# Patient Record
Sex: FEMALE | Employment: UNEMPLOYED | URBAN - METROPOLITAN AREA
[De-identification: names, ages, dates, MRNs, and addresses within clinical notes are randomized per-mention and may not be internally consistent; named-entity substitution may affect disease eponyms.]

---

## 2017-06-08 LAB — PAP-ABSTRACT: NORMAL

## 2020-01-05 LAB
CREAT SERPL-MCNC: 0.95 MG/DL (ref 0.57–1.11)
GFR SERPL CREATININE-BSD FRML MDRD: >60 ML/MIN/1.73M2
GLUCOSE SERPL-MCNC: 93 MG/DL (ref 65–100)
POTASSIUM SERPL-SCNC: 4.1 MMOL/L (ref 3.5–5)

## 2020-01-20 LAB
ABO + RH BLD: NORMAL
ABO + RH BLD: NORMAL
BLD GP AB SCN SERPL QL: NEGATIVE
HBV SURFACE AG SERPL QL IA: NORMAL
HCT VFR BLD AUTO: 42.7 %
HEMOGLOBIN: 13.4 G/DL (ref 11.7–15.7)
HIV 1+2 AB+HIV1 P24 AG SERPL QL IA: NORMAL
PLATELET # BLD AUTO: 259 10^9/L
RUBELLA ABY IGG: NORMAL
TREPONEMA ANTIBODIES: NORMAL

## 2020-03-23 LAB
ALT SERPL-CCNC: 46 IU/L
AST SERPL-CCNC: 32 IU/L (ref 5–40)

## 2020-04-15 ENCOUNTER — TRANSFERRED RECORDS (OUTPATIENT)
Dept: MULTI SPECIALTY CLINIC | Facility: CLINIC | Age: 30
End: 2020-04-15

## 2020-04-15 LAB
C TRACH DNA SPEC QL PROBE+SIG AMP: NEGATIVE
N GONORRHOEA DNA SPEC QL PROBE+SIG AMP: NEGATIVE
SPECIMEN DESCRIP: NORMAL
SPECIMEN DESCRIPTION: NORMAL

## 2020-04-21 ENCOUNTER — TELEPHONE (OUTPATIENT)
Dept: MIDWIFE SERVICES | Facility: CLINIC | Age: 30
End: 2020-04-21

## 2020-04-21 DIAGNOSIS — Z34.92 ENCOUNTER FOR SUPERVISION OF NORMAL PREGNANCY IN SECOND TRIMESTER, UNSPECIFIED GRAVIDITY: Primary | ICD-10-CM

## 2020-04-21 PROBLEM — Z67.91 RH NEGATIVE STATUS DURING PREGNANCY: Status: ACTIVE | Noted: 2020-04-21

## 2020-04-21 PROBLEM — R87.619 ABNORMAL PAP SMEAR OF CERVIX: Status: ACTIVE | Noted: 2020-04-21

## 2020-04-21 PROBLEM — Z86.14 HISTORY OF MRSA INFECTION: Status: ACTIVE | Noted: 2020-04-21

## 2020-04-21 PROBLEM — E55.9 VITAMIN D DEFICIENCY: Status: ACTIVE | Noted: 2020-04-21

## 2020-04-21 PROBLEM — O26.899 RH NEGATIVE STATUS DURING PREGNANCY: Status: ACTIVE | Noted: 2020-04-21

## 2020-04-21 PROBLEM — Z86.19 HISTORY OF SEXUALLY TRANSMITTED DISEASE: Status: ACTIVE | Noted: 2020-04-21

## 2020-04-21 PROBLEM — Z34.00 SUPERVISION OF NORMAL FIRST PREGNANCY: Status: ACTIVE | Noted: 2020-04-21

## 2020-04-21 NOTE — TELEPHONE ENCOUNTER
Patient is a transfer OB from [facility]. Called to schedule appointment (scheduled for 4/24) and patient was wondering if ultrasound could be done as well. JLUIS is noted to be 9/4/20 in records. Please order if okay. Thank you!  Cathy Umanzor,

## 2020-04-23 PROBLEM — Z23 NEED FOR TDAP VACCINATION: Status: ACTIVE | Noted: 2020-04-23

## 2020-04-28 ENCOUNTER — PRENATAL OFFICE VISIT (OUTPATIENT)
Dept: MIDWIFE SERVICES | Facility: CLINIC | Age: 30
End: 2020-04-28
Payer: COMMERCIAL

## 2020-04-28 ENCOUNTER — ANCILLARY PROCEDURE (OUTPATIENT)
Dept: ULTRASOUND IMAGING | Facility: CLINIC | Age: 30
End: 2020-04-28
Attending: ADVANCED PRACTICE MIDWIFE
Payer: COMMERCIAL

## 2020-04-28 VITALS
WEIGHT: 262.9 LBS | HEIGHT: 69 IN | BODY MASS INDEX: 38.94 KG/M2 | SYSTOLIC BLOOD PRESSURE: 106 MMHG | DIASTOLIC BLOOD PRESSURE: 80 MMHG | TEMPERATURE: 99.2 F

## 2020-04-28 DIAGNOSIS — Z67.91 RH NEGATIVE, ANTEPARTUM: ICD-10-CM

## 2020-04-28 DIAGNOSIS — O23.599 BACTERIAL VAGINOSIS IN PREGNANCY: Primary | ICD-10-CM

## 2020-04-28 DIAGNOSIS — O26.899 RH NEGATIVE, ANTEPARTUM: ICD-10-CM

## 2020-04-28 DIAGNOSIS — Z34.92 ENCOUNTER FOR SUPERVISION OF NORMAL PREGNANCY IN SECOND TRIMESTER, UNSPECIFIED GRAVIDITY: ICD-10-CM

## 2020-04-28 DIAGNOSIS — N89.8 VAGINAL ITCHING: ICD-10-CM

## 2020-04-28 DIAGNOSIS — E55.9 VITAMIN D DEFICIENCY: ICD-10-CM

## 2020-04-28 DIAGNOSIS — B96.89 BACTERIAL VAGINOSIS IN PREGNANCY: Primary | ICD-10-CM

## 2020-04-28 DIAGNOSIS — Z23 NEED FOR TDAP VACCINATION: ICD-10-CM

## 2020-04-28 LAB
SPECIMEN SOURCE: ABNORMAL
WET PREP SPEC: ABNORMAL

## 2020-04-28 PROCEDURE — 76805 OB US >/= 14 WKS SNGL FETUS: CPT | Performed by: OBSTETRICS & GYNECOLOGY

## 2020-04-28 PROCEDURE — 99213 OFFICE O/P EST LOW 20 MIN: CPT | Performed by: ADVANCED PRACTICE MIDWIFE

## 2020-04-28 PROCEDURE — 87210 SMEAR WET MOUNT SALINE/INK: CPT | Performed by: ADVANCED PRACTICE MIDWIFE

## 2020-04-28 RX ORDER — CHOLECALCIFEROL (VITAMIN D3) 50 MCG
1 TABLET ORAL DAILY
Qty: 90 TABLET | Refills: 0 | Status: SHIPPED | OUTPATIENT
Start: 2020-04-28 | End: 2020-12-23

## 2020-04-28 RX ORDER — METRONIDAZOLE 500 MG/1
500 TABLET ORAL 2 TIMES DAILY
Qty: 14 TABLET | Refills: 0 | Status: SHIPPED | OUTPATIENT
Start: 2020-04-28 | End: 2020-05-14

## 2020-04-28 RX ORDER — PRENATAL VIT/IRON FUM/FOLIC AC 27MG-0.8MG
1 TABLET ORAL DAILY
Qty: 90 TABLET | Refills: 3 | Status: SHIPPED | OUTPATIENT
Start: 2020-04-28 | End: 2020-12-23

## 2020-04-28 ASSESSMENT — MIFFLIN-ST. JEOR: SCORE: 1991.89

## 2020-05-11 ENCOUNTER — TELEPHONE (OUTPATIENT)
Dept: MIDWIFE SERVICES | Facility: CLINIC | Age: 30
End: 2020-05-11

## 2020-05-11 NOTE — TELEPHONE ENCOUNTER
Reason for call:  Other   Patient called regarding (reason for call): call back  Additional comments: Patient was treated for BV in late April and after finishing treatment is still having symptoms.    Phone number to reach patient:  Home number on file [phone] (home)    Best Time:  n/a    Can we leave a detailed message on this number?  YES    Travel screening: Not Applicable

## 2020-05-13 ENCOUNTER — PRENATAL OFFICE VISIT (OUTPATIENT)
Dept: MIDWIFE SERVICES | Facility: CLINIC | Age: 30
End: 2020-05-13
Payer: COMMERCIAL

## 2020-05-13 DIAGNOSIS — Z34.02 ENCOUNTER FOR SUPERVISION OF NORMAL FIRST PREGNANCY IN SECOND TRIMESTER: Primary | ICD-10-CM

## 2020-05-13 PROCEDURE — 99207 ZZC PRENATAL VISIT: CPT | Performed by: ADVANCED PRACTICE MIDWIFE

## 2020-05-13 NOTE — PROGRESS NOTES
"Shanda Manrique is a 29 year old female who is being evaluated via a billable video visit.      The patient has been notified of following:     \"This video visit will be conducted via a call between you and your physician/provider. We have found that certain health care needs can be provided without the need for an in-person physical exam.  This service lets us provide the care you need with a video conversation.  If a prescription is necessary we can send it directly to your pharmacy.  If lab work is needed we can place an order for that and you can then stop by our lab to have the test done at a later time.    Video visits are billed at different rates depending on your insurance coverage.  Please reach out to your insurance provider with any questions.    If during the course of the call the physician/provider feels a video visit is not appropriate, you will not be charged for this service.\"    Patient has given verbal consent for Video visit? Yes    How would you like to obtain your AVS? Forrest    Patient would like the video invitation sent by: Text to cell phone: [phone]    Will anyone else be joining your video visit? No        Video-Visit Details  Unable to get video visit to work. Converted to phone visit.    22w5d  Telephone call to Shanda. She has complaints of vaginal odor, discharge and itching. Has 1 pill of flagyl left, but does not feel that it has helped. She also has intense itching in her pubic hair, and does not know the cause. States that her previous clinic tried many different things to try to help, but nothing has worked. She sometimes itches until it bleeds. She has not been able to  her prenatal vitamins because her insurance doesn't cover. Will call pharmacy to try to figure out a plan for her. She also has some pain in her left ear. Recommend she be seen in clinic to work up her vaginal odor/itching since it has been unresolved with treatment. Will send her chart to nursing " to get her scheduled.  Baldo Beckman CNM

## 2020-05-13 NOTE — Clinical Note
Can you call call Shanda and get her scheduled for in-person visit for on-going vaginal symptoms? Also, has L ear pain for which she should see family practice.  Baldo Beckman CNM

## 2020-05-14 ENCOUNTER — PRENATAL OFFICE VISIT (OUTPATIENT)
Dept: MIDWIFE SERVICES | Facility: CLINIC | Age: 30
End: 2020-05-14
Payer: COMMERCIAL

## 2020-05-14 VITALS — SYSTOLIC BLOOD PRESSURE: 94 MMHG | DIASTOLIC BLOOD PRESSURE: 60 MMHG | WEIGHT: 267.3 LBS | BODY MASS INDEX: 39.47 KG/M2

## 2020-05-14 DIAGNOSIS — N89.8 VAGINAL DISCHARGE: ICD-10-CM

## 2020-05-14 DIAGNOSIS — Z34.02 ENCOUNTER FOR SUPERVISION OF NORMAL FIRST PREGNANCY, SECOND TRIMESTER: Primary | ICD-10-CM

## 2020-05-14 LAB
SPECIMEN SOURCE: NORMAL
WET PREP SPEC: NORMAL

## 2020-05-14 PROCEDURE — 99213 OFFICE O/P EST LOW 20 MIN: CPT | Performed by: ADVANCED PRACTICE MIDWIFE

## 2020-05-14 PROCEDURE — 87210 SMEAR WET MOUNT SALINE/INK: CPT | Performed by: ADVANCED PRACTICE MIDWIFE

## 2020-05-14 RX ORDER — PRENATAL VIT/IRON FUM/FOLIC AC 27MG-0.8MG
1 TABLET ORAL DAILY
Qty: 90 TABLET | Refills: 3 | Status: SHIPPED | OUTPATIENT
Start: 2020-05-14 | End: 2020-06-12

## 2020-05-14 NOTE — PROGRESS NOTES
22w6d  Feels fine except for malodorous vaginal discharge. Wonders if she has bacterial vaginosis again. Wet prep collected. Also has concerns with itchy left ear canal and lump on right ear lobe that feels like lipoma. Her left ear canal appears unremarkable except for a good amount of wet cerumen, likely causing it  To feel wet and itchy. Reassurance given about ears. She also has an infected folicle on her abdomen under her umbilicus that drains pus. Recommend warm compresses to draw out the infection and treating with topical antibiotics. If unimproved she should see family practice. I did order her an MFM ultrasound to f/u from her previous survey in which the fetal head anatomy was not well seen. Reports good fetal movement. Denies leaking of fluid, vaginal bleeding, regular uterine contractions, headache or other concerns.  RTC in 2 wks for next visit. Rx given for PNVs. luana

## 2020-05-15 ENCOUNTER — TRANSCRIBE ORDERS (OUTPATIENT)
Dept: MATERNAL FETAL MEDICINE | Facility: CLINIC | Age: 30
End: 2020-05-15

## 2020-05-15 DIAGNOSIS — O26.90 PREGNANCY RELATED CONDITION, ANTEPARTUM: Primary | ICD-10-CM

## 2020-05-18 ENCOUNTER — PRE VISIT (OUTPATIENT)
Dept: MATERNAL FETAL MEDICINE | Facility: CLINIC | Age: 30
End: 2020-05-18

## 2020-05-20 ENCOUNTER — OFFICE VISIT (OUTPATIENT)
Dept: MATERNAL FETAL MEDICINE | Facility: CLINIC | Age: 30
End: 2020-05-20
Attending: OBSTETRICS & GYNECOLOGY
Payer: COMMERCIAL

## 2020-05-20 ENCOUNTER — OFFICE VISIT (OUTPATIENT)
Dept: MATERNAL FETAL MEDICINE | Facility: CLINIC | Age: 30
End: 2020-05-20
Attending: ADVANCED PRACTICE MIDWIFE
Payer: COMMERCIAL

## 2020-05-20 ENCOUNTER — HOSPITAL ENCOUNTER (OUTPATIENT)
Dept: ULTRASOUND IMAGING | Facility: CLINIC | Age: 30
End: 2020-05-20
Attending: ADVANCED PRACTICE MIDWIFE
Payer: COMMERCIAL

## 2020-05-20 DIAGNOSIS — Z36.3 SCREENING, ANTENATAL, FOR MALFORMATION BY ULTRASOUND: ICD-10-CM

## 2020-05-20 DIAGNOSIS — Z36.9 ENCOUNTER FOR ANTENATAL SCREENING OF MOTHER: ICD-10-CM

## 2020-05-20 DIAGNOSIS — Z36.9 ENCOUNTER FOR ANTENATAL SCREENING OF MOTHER: Primary | ICD-10-CM

## 2020-05-20 DIAGNOSIS — O26.90 PREGNANCY RELATED CONDITION, ANTEPARTUM: ICD-10-CM

## 2020-05-20 DIAGNOSIS — Z03.73 ENCOUNTER FOR OBSERVATION OF SUSPECTED FETAL ANOMALY NOT FOUND: Primary | ICD-10-CM

## 2020-05-20 PROCEDURE — 76811 OB US DETAILED SNGL FETUS: CPT

## 2020-05-20 PROCEDURE — 81511 FTL CGEN ABNOR FOUR ANAL: CPT | Performed by: OBSTETRICS & GYNECOLOGY

## 2020-05-20 PROCEDURE — 36415 COLL VENOUS BLD VENIPUNCTURE: CPT | Performed by: OBSTETRICS & GYNECOLOGY

## 2020-05-20 PROCEDURE — 40000072 ZZH STATISTIC GENETIC COUNSELING, < 16 MIN: Mod: ZF | Performed by: GENETIC COUNSELOR, MS

## 2020-05-20 NOTE — PROGRESS NOTES
[facility]  Genetic Counseling Consult    Patient:  Shanda Manrique YOB: 1990   Date of Service:  20      Shanda Manrique was seen at the [facility] for genetic consultation as part of her appointment for comprehensive ultrasound. The indication for genetic counseling is desire for aneuploidy screening.    Impression/Plan:   1. Shanda has not had serum screening in this pregnancy.     2. Shanda had a comprehensive (level II) ultrasound today.  Please see the ultrasound report for further details.    3. The patient had a blood draw for quad screen today.  Results are expected within 7 days, and will be available in Access Information Management. We will contact her to discuss the results, and a copy will be forwarded to the office of the referring OB provider. Shanda Manrique provided verbal permission for results to be left on her voicemail.    Pregnancy History:   /Parity:     Age at Delivery: 30 year old  JLUIS: 2020, by Ultrasound  Gestational Age: 23w5d    Medical History:   Shanda atwood reported medical history is not expected to impact pregnancy management or risks to fetal development.       Family History:   A three-generation pedigree was not obtained. However, Shanda did report the family history is negative for multiple miscarriages, stillbirths, birth defects, intellectual disability, known genetic conditions, and consanguinity.       Carrier Screening:   Carrier screening was beyond the scope of our discussion today.        Risk Assessment for Chromosome Conditions:   We explained that the risk for fetal chromosome abnormalities increases with maternal age. We discussed specific features of common chromosome abnormalities, including Down syndrome, trisomy 13, trisomy 18, and sex chromosome trisomies.      At age 30 at midtrimester, the risk to have a baby with Down syndrome is 1 in 855.    At age 30 at midtrimester, the  risk to have a baby with any chromosome abnormality is 1 in 428.       Shanda did not have maternal serum screening earlier in pregnancy.    Testing Options:   We discussed the following options:   Non-invasive Prenatal Testing (NIPT)    Maternal plasma cell-free DNA testing; first trimester ultrasound with nuchal translucency and nasal bone assessment is recommended, when appropriate    Screens for fetal trisomy 21, trisomy 13, trisomy 18, and sex chromosome aneuploidy    Cannot screen for open neural tube defects; maternal serum AFP after 15 weeks is recommended     Quad screen:     Maternal plasma AFP, hCG, estriol, and inhibin measurement between 15-24 weeks gestation    Provides risk assessment for fetal Down syndrome, trisomy 18, and neural tube defects     Genetic Amniocentesis    Invasive procedure typically performed in the second trimester by which amniotic fluid is obtained for the purpose of chromosome analysis and/or other prenatal genetic analysis    Diagnostic results; >99% sensitivity for fetal chromosome abnormalities    AFAFP measurement tests for open neural tube defects     Comprehensive (Level II) ultrasound: Detailed ultrasound performed between 18-22 weeks gestation to screen for major birth defects and markers for aneuploidy.    We reviewed the benefits and limitations of this testing.  Screening tests provide a risk assessment specific to the pregnancy for certain fetal chromosome abnormalities, but cannot definitively diagnose or exclude a fetal chromosome abnormality.  Follow-up genetic counseling and consideration of diagnostic testing is recommended with any abnormal screening result.     Diagnostic tests carry inherent risks- including risk of miscarriage- that require careful consideration.  These tests can detect fetal chromosome abnormalities with greater than 99% certainty.  Results can be compromised by maternal cell contamination or mosaicism, and are limited by the resolution of  cytogenetic G-banding technology.  There is no screening nor diagnostic test that can detect all forms of birth defects or mental disability.    It was a pleasure to be involved with Shanda s care. Face-to-face time of the meeting was 15 minutes.    Martha Simon MS, [facility]  Licensed Genetic Counselor   [facility]  Maternal Fetal Medicine  [email or web address]  Office: [phone]  Pager [phone]  M: [phone]   Fax: [phone]

## 2020-05-20 NOTE — PROGRESS NOTES
Please refer to ultrasound report under 'Imaging' Studies of 'Chart Review' tabs.    Perez Carmona M.D.

## 2020-05-22 ENCOUNTER — TELEPHONE (OUTPATIENT)
Dept: MATERNAL FETAL MEDICINE | Facility: CLINIC | Age: 30
End: 2020-05-22

## 2020-05-22 LAB
# FETUSES US: NORMAL
# FETUSES: NORMAL
AFP ADJ MOM AMN: 1.32
AFP SERPL-MCNC: 102 NG/ML
AGE - REPORTED: 28.2 YR
CURRENT SMOKER: NO
CURRENT SMOKER: NO
DIABETES STATUS PATIENT: NO
FAMILY MEMBER DISEASES HX: NO
FAMILY MEMBER DISEASES HX: NO
GA METHOD: NORMAL
GA METHOD: NORMAL
GA: NORMAL WK
HCG MOM SERPL: 0.87
HCG SERPL-ACNC: NORMAL IU/L
HX OF HEREDITARY DISORDERS: NO
IDDM PATIENT QL: NO
INHIBIN A MOM SERPL: 3.07
INHIBIN A SERPL-MCNC: 602 PG/ML
INTEGRATED SCN PATIENT-IMP: NORMAL
IVF PREGNANCY: NO
LMP START DATE: NORMAL
MONOCHORIONIC TWINS: NO
PATHOLOGY STUDY: NORMAL
PREV FETUS DEFECT: NO
SERVICE CMNT-IMP: NO
SPECIMEN DRAWN SERPL: NORMAL
U ESTRIOL MOM SERPL: 0.87
U ESTRIOL SERPL-MCNC: 2.59 NG/ML
VALPROIC/CARBAMAZEPINE STATUS: NO
WEIGHT UNITS: NORMAL

## 2020-05-22 NOTE — TELEPHONE ENCOUNTER
Talked with Shanda PAL Manrique regarding her QUAD screening results.    These results indicated a 1 in 1610 risk for Down syndrome, a <1 in 81582 risk for trisomy 18, and a 1 in 4930 for neural tube defects.    The AFP was 1.32 MoM, the Estriol (uE3) was 0.87 MoM, the hCG was 0.87 MoM, and the Dimeric Inhibin A was 3.07MoM.     This screening test gives information about the risk of some chromosome conditions in a pregnancy, but does not definitively diagnose or exclude the presence of these chromosome conditions.     While this result is reassuring, it does not rule out all possible genetic conditions. There is not currently one single genetic test available that can assess for all possible genetic conditions.    A copy of these results are available in her EPIC chart for her primary OB to review.    Martha Simon MS, [facility]  Licensed Genetic Counselor   [facility]  Maternal Fetal Medicine  [email or web address]  [email or web address]  Office: [phone]  Pager [phone]  [facility]: [phone]   Fax: [phone]

## 2020-06-09 ENCOUNTER — PRENATAL OFFICE VISIT (OUTPATIENT)
Dept: MIDWIFE SERVICES | Facility: CLINIC | Age: 30
End: 2020-06-09
Payer: COMMERCIAL

## 2020-06-09 DIAGNOSIS — Z34.02 ENCOUNTER FOR SUPERVISION OF NORMAL FIRST PREGNANCY IN SECOND TRIMESTER: Primary | ICD-10-CM

## 2020-06-09 PROCEDURE — 99212 OFFICE O/P EST SF 10 MIN: CPT | Mod: 95 | Performed by: ADVANCED PRACTICE MIDWIFE

## 2020-06-09 NOTE — Clinical Note
Patient would like in-person visit and GCT/hgb tomorrow if possible. Can you get her scheduled?  Baldo Beckman CNM

## 2020-06-09 NOTE — PROGRESS NOTES
"Shanda Manrique is a 29 year old female who is being evaluated via a billable telephone visit.      The patient has been notified of following:     \"This telephone visit will be conducted via a call between you and your physician/provider. We have found that certain health care needs can be provided without the need for a physical exam.  This service lets us provide the care you need with a short phone conversation.  If a prescription is necessary we can send it directly to your pharmacy.  If lab work is needed we can place an order for that and you can then stop by our lab to have the test done at a later time.    Telephone visits are billed at different rates depending on your insurance coverage. During this emergency period, for some insurers they may be billed the same as an in-person visit.  Please reach out to your insurance provider with any questions.    If during the course of the call the physician/provider feels a telephone visit is not appropriate, you will not be charged for this service.\"    Patient has given verbal consent for Telephone visit?  Yes    What phone number would you like to be contacted at?   [phone]  How would you like to obtain your AVS? Daviahart    26w4d  TC to Shanda. She is feeling well. Having a hard time with her appetite. Encouraged small, frequent meals. She came to clinic earlier today, even though she had phone visit scheduled. Offered to see her and do GCT at that time, but she had her niece with her and couldn't stay. She would like to schedule GCT/hgb for tomorrow. Will send her chart to RN team to get her scheduled. Baby is active, denies bleeding, leaking of fluid, headaches.     Phone call duration: 8 minutes    Baldo Beckman CNM    "

## 2020-06-12 ENCOUNTER — VIRTUAL VISIT (OUTPATIENT)
Dept: MIDWIFE SERVICES | Facility: CLINIC | Age: 30
End: 2020-06-12
Payer: COMMERCIAL

## 2020-06-12 DIAGNOSIS — R21 RASH AND NONSPECIFIC SKIN ERUPTION: ICD-10-CM

## 2020-06-12 PROCEDURE — 99207 ZZC NO BILLABLE SERVICE THIS VISIT: CPT | Performed by: ADVANCED PRACTICE MIDWIFE

## 2020-06-12 NOTE — PROGRESS NOTES
"Shanda Manrique is a 29 year old female who is being evaluated via a billable telephone visit.  Shanda has been experiencing a skin rash on her face around her mouth and in her armpits for several days. She describes the rash and raised pink bumps that are painful, non-pruritic, and some that have developed pustules. Before becoming pregnant, Shanda received a diagnosis of hidradenitis suppurativa. She received no treatment, and felt like her provider did not do a thorough workup for this condition because they didn't do any lab tests. Shanda denies itching, denies lesions or itching on hands and feet. She feels baby moving a lot. Denies leaking of fluid, vaginal bleeding, regular uterine contractions, headache, visual changes, or other concerns. Low suspicion for any dermatoses of pregnancy. No suspicion for cholestasis or PUPP. Shanda should be evaluated by in person visit. Encouraged to call to schedule with primary care.         The patient has been notified of following:     \"This telephone visit will be conducted via a call between you and your physician/provider. We have found that certain health care needs can be provided without the need for a physical exam.  This service lets us provide the care you need with a short phone conversation.  If a prescription is necessary we can send it directly to your pharmacy.  If lab work is needed we can place an order for that and you can then stop by our lab to have the test done at a later time.    Telephone visits are billed at different rates depending on your insurance coverage. During this emergency period, for some insurers they may be billed the same as an in-person visit.  Please reach out to your insurance provider with any questions.    If during the course of the call the physician/provider feels a telephone visit is not appropriate, you will not be charged for this service.\"    Patient has given verbal consent for Telephone visit?  Yes    What phone " number would you like to be contacted at?   [phone]  How would you like to obtain your AVS? QuoVadis    Phone call duration: 10 minutes    LEV Strickland CNM

## 2020-06-15 ENCOUNTER — TELEPHONE (OUTPATIENT)
Dept: MIDWIFE SERVICES | Facility: CLINIC | Age: 30
End: 2020-06-15

## 2020-06-15 ENCOUNTER — VIRTUAL VISIT (OUTPATIENT)
Dept: FAMILY MEDICINE | Facility: CLINIC | Age: 30
End: 2020-06-15
Payer: COMMERCIAL

## 2020-06-15 DIAGNOSIS — L50.9 HIVES: Primary | ICD-10-CM

## 2020-06-15 PROCEDURE — 99202 OFFICE O/P NEW SF 15 MIN: CPT | Mod: 95 | Performed by: FAMILY MEDICINE

## 2020-06-15 RX ORDER — CETIRIZINE HYDROCHLORIDE 10 MG/1
10 TABLET ORAL DAILY
Qty: 30 TABLET | Refills: 1 | Status: SHIPPED | OUTPATIENT
Start: 2020-06-15 | End: 2020-12-23

## 2020-06-15 NOTE — PROGRESS NOTES
"Shanda Manrique is a 29 year old female who is being evaluated via a billable video visit.      The patient has been notified of following:     \"This video visit will be conducted via a call between you and your physician/provider. We have found that certain health care needs can be provided without the need for an in-person physical exam.  This service lets us provide the care you need with a video conversation.  If a prescription is necessary we can send it directly to your pharmacy.  If lab work is needed we can place an order for that and you can then stop by our lab to have the test done at a later time.    Video visits are billed at different rates depending on your insurance coverage.  Please reach out to your insurance provider with any questions.    If during the course of the call the physician/provider feels a video visit is not appropriate, you will not be charged for this service.\"    Patient has given verbal consent for Video visit? Yes    Will anyone else be joining your video visit? No      Subjective     Shanda Manrique is a 29 year old female who presents today via video visit for the following health issues:    HPI  Rash: Torso, bilateral extremities, lips.       Duration: couples days.     Description  Location: Torso, Lips, under both arms.   Itching: no    Intensity:  Mild. itchy    Accompanying signs and symptoms: ear ache, just started today in right ear.     History (similar episodes/previous evaluation): None    Precipitating or alleviating factors:  New exposures:  None  Recent travel: no      Therapies tried and outcome: alcohol wipes. And hotpacks.          Video Start Time: 1055    Encounter Diagnosis   Name Primary?     Hives Yes     Began in axilla( are now healing)  No breathing or swallowing symptoms   Has had possible hyperhidrosis before ( was different)    Current Outpatient Medications   Medication Sig Dispense Refill     cetirizine (ZYRTEC) 10 MG tablet Take 1 tablet (10 mg) by " "mouth daily 30 tablet 1     Prenatal Vit-Fe Fumarate-FA (PRENATAL MULTIVITAMIN W/IRON) 27-0.8 MG tablet Take 1 tablet by mouth daily 90 tablet 3     vitamin D3 (CHOLECALCIFEROL) 2000 units (50 mcg) tablet Take 1 tablet (2,000 Units) by mouth daily 90 tablet 0       Reviewed and updated as needed this visit by Provider         Review of Systems   Constitutional, HEENT, cardiovascular, pulmonary, gi and gu systems are negative, except as otherwise noted.      Objective    LMP 10/30/2019   Estimated body mass index is 39.47 kg/m  as calculated from the following:    Height as of 4/28/20: 1.753 m (5' 9\").    Weight as of 5/14/20: 121.2 kg (267 lb 4.8 oz).  Physical Exam     GENERAL: Healthy, alert and no distress  EYES: Eyes grossly normal to inspection.  No discharge or erythema, or obvious scleral/conjunctival abnormalities.  RESP: No audible wheeze, cough, or visible cyanosis.  No visible retractions or increased work of breathing.    SKIN: no suspicious lesions has scattered slightly raised patches / excoriation - feet and lips  NEURO: Cranial nerves grossly intact.  Mentation and speech appropriate for age.  PSYCH: Mentation appears normal, affect normal/bright, judgement and insight intact, normal speech and appearance well-groomed.      Diagnostic Test Results:  Labs reviewed in Epic        Assessment & Plan     1. Hives  Limit soap, use OTC simple lotion  - cetirizine (ZYRTEC) 10 MG tablet; Take 1 tablet (10 mg) by mouth daily  Dispense: 30 tablet; Refill: 1   Follow up by phone in 4 days if not improving.         See Patient Instructions    No follow-ups on file.    Ascencion Dick MD  [facility]      [place]-Visit Details    Type of service:  Video Visit    Video End Time:11:06 AM    Originating Location (patient. Location): Home    [place]istant [place]  [facility]     Platform used for Video Visit: Arnaldo    No follow-ups on file.       Ascencion Herrera" MD Kapil

## 2020-06-15 NOTE — TELEPHONE ENCOUNTER
Patient is 27w3d, c/o really bad pressure pains in pelvic and vaginal area. Hurts when she walks. Constant pain. No change in vaginal discharge. No odor, no discharge. Wondering if she should be concerned. +FM. No contractions. Routing to on-call CNM. Please advise - does she need clinic appt? Thanks.   Renae Bower, RN-BSN

## 2020-06-15 NOTE — TELEPHONE ENCOUNTER
Per Dulce:   Let's schedule Shanda for a clinic appointment this week. She is almost 28 weeks and doesn't have anything on the books yet so she should be seen in person regardless.

## 2020-06-15 NOTE — TELEPHONE ENCOUNTER
Attempted to call pt, but unable to reach her.  Can you please try calling her to schedule?  Marleny Mtz RN

## 2020-06-16 ENCOUNTER — PRENATAL OFFICE VISIT (OUTPATIENT)
Dept: MIDWIFE SERVICES | Facility: CLINIC | Age: 30
End: 2020-06-16
Payer: COMMERCIAL

## 2020-06-16 VITALS
HEIGHT: 69 IN | BODY MASS INDEX: 43 KG/M2 | WEIGHT: 290.3 LBS | HEART RATE: 88 BPM | TEMPERATURE: 99.3 F | SYSTOLIC BLOOD PRESSURE: 112 MMHG | DIASTOLIC BLOOD PRESSURE: 78 MMHG

## 2020-06-16 DIAGNOSIS — H60.391 INFECTIVE OTITIS EXTERNA, RIGHT: ICD-10-CM

## 2020-06-16 DIAGNOSIS — O09.92 SUPERVISION OF HIGH RISK PREGNANCY IN SECOND TRIMESTER: Primary | ICD-10-CM

## 2020-06-16 DIAGNOSIS — L02.229 BOIL OF TRUNK: ICD-10-CM

## 2020-06-16 PROCEDURE — 99213 OFFICE O/P EST LOW 20 MIN: CPT | Performed by: ADVANCED PRACTICE MIDWIFE

## 2020-06-16 ASSESSMENT — MIFFLIN-ST. JEOR: SCORE: 2116.17

## 2020-06-16 ASSESSMENT — ANXIETY QUESTIONNAIRES
6. BECOMING EASILY ANNOYED OR IRRITABLE: SEVERAL DAYS
3. WORRYING TOO MUCH ABOUT DIFFERENT THINGS: NOT AT ALL
7. FEELING AFRAID AS IF SOMETHING AWFUL MIGHT HAPPEN: NOT AT ALL
1. FEELING NERVOUS, ANXIOUS, OR ON EDGE: NOT AT ALL
2. NOT BEING ABLE TO STOP OR CONTROL WORRYING: NOT AT ALL
GAD7 TOTAL SCORE: 1
5. BEING SO RESTLESS THAT IT IS HARD TO SIT STILL: NOT AT ALL

## 2020-06-16 ASSESSMENT — PATIENT HEALTH QUESTIONNAIRE - PHQ9
5. POOR APPETITE OR OVEREATING: NOT AT ALL
SUM OF ALL RESPONSES TO PHQ QUESTIONS 1-9: 3

## 2020-06-16 NOTE — PROGRESS NOTES
Please abstract the following data from this visit with this patient into the appropriate field in Epic:    Tests that can be patient reported without a hard copy:    Pap smear done on this date: 6/8/2017 (approximately), by this group: [facility] in [place], results were NIL.

## 2020-06-16 NOTE — Clinical Note
Pap smear done on this date: 6/8/2017 (approximately), by this group: [facility], results were NIL.

## 2020-06-17 ASSESSMENT — ANXIETY QUESTIONNAIRES: GAD7 TOTAL SCORE: 1

## 2020-06-18 NOTE — PROGRESS NOTES
Shanda comes here with c/o pelvic pressure.  She is not having ctx as denies anything like menses but has pain when she walks and feels pressure when she stands up.  No change in discharge.  Appears to be muscular skeletal pain as pressure resolves with position changes.  Low concern for PTL based on interview.  Is also having external otitis media of the R ear.  L ear TM is occluded by wax.  R ear unable to visualize with scope as to painful for pt to tolerate in the ear canal.  Will treat with an antibiotic/hctz combo and reassess.  Discussed her long term issue with pubic itching.  Visual exam to rule out lice.  Skin discoloration and has tried anti scaling shampoos in area.  Pt also is prone to ingrown hairs and anisha.  Has one on abdomen between umbilicus and pubis.  It is not fluctuant at this time and is scar tissue that needs surgical excision in the future for mgt but is not urgent to address during pregnancy.    Pt is Rh Negative and has not had Rhogam.  Is scheduled for there GCT on Friday with antibody screen and then Rhogam to follow.  Reviewed Rhogam rational and importance of this being done this week since she is Rh negative and 1st pregnancy.

## 2020-06-19 ENCOUNTER — ALLIED HEALTH/NURSE VISIT (OUTPATIENT)
Dept: NURSING | Facility: CLINIC | Age: 30
End: 2020-06-19
Payer: COMMERCIAL

## 2020-06-19 VITALS
HEIGHT: 69 IN | BODY MASS INDEX: 42.87 KG/M2 | HEART RATE: 74 BPM | SYSTOLIC BLOOD PRESSURE: 132 MMHG | DIASTOLIC BLOOD PRESSURE: 90 MMHG

## 2020-06-19 DIAGNOSIS — Z67.91 RH NEGATIVE, ANTEPARTUM: ICD-10-CM

## 2020-06-19 DIAGNOSIS — O26.899 RH NEGATIVE, ANTEPARTUM: ICD-10-CM

## 2020-06-19 DIAGNOSIS — Z29.13 NEED FOR RHOGAM DUE TO RH NEGATIVE MOTHER: Primary | ICD-10-CM

## 2020-06-19 DIAGNOSIS — Z34.92 ENCOUNTER FOR SUPERVISION OF NORMAL PREGNANCY IN SECOND TRIMESTER, UNSPECIFIED GRAVIDITY: ICD-10-CM

## 2020-06-19 LAB
BLD GP AB SCN SERPL QL: NORMAL
GLUCOSE 1H P 50 G GLC PO SERPL-MCNC: 90 MG/DL (ref 60–129)
HGB BLD-MCNC: 12.5 G/DL (ref 11.7–15.7)

## 2020-06-19 PROCEDURE — 82950 GLUCOSE TEST: CPT | Performed by: ADVANCED PRACTICE MIDWIFE

## 2020-06-19 PROCEDURE — 86850 RBC ANTIBODY SCREEN: CPT | Performed by: ADVANCED PRACTICE MIDWIFE

## 2020-06-19 PROCEDURE — 00000218 ZZHCL STATISTIC OBHBG - HEMOGLOBIN: Performed by: ADVANCED PRACTICE MIDWIFE

## 2020-06-19 PROCEDURE — 96372 THER/PROPH/DIAG INJ SC/IM: CPT

## 2020-06-19 PROCEDURE — 36415 COLL VENOUS BLD VENIPUNCTURE: CPT | Performed by: ADVANCED PRACTICE MIDWIFE

## 2020-06-19 NOTE — PROGRESS NOTES
Clinic Administered Medication Documentation      Injectable Medication Documentation    Patient was given Rhogam. Prior to medication administration, verified patients identity using patient s name and date of birth. Please see MAR and medication order for additional information. Patient instructed to stay in clinic after the injection but patient declined.      Was entire vial of medication used? Yes  Vial/Syringe: Syringe  Expiration Date:  2/7/2022  Was this medication supplied by the patient? No

## 2020-06-24 ENCOUNTER — HOSPITAL ENCOUNTER (OUTPATIENT)
Dept: ULTRASOUND IMAGING | Facility: CLINIC | Age: 30
End: 2020-06-24
Attending: ADVANCED PRACTICE MIDWIFE
Payer: COMMERCIAL

## 2020-06-24 ENCOUNTER — HOSPITAL ENCOUNTER (INPATIENT)
Facility: CLINIC | Age: 30
LOS: 1 days | Discharge: HOME | End: 2020-06-25
Attending: OBSTETRICS & GYNECOLOGY | Admitting: OBSTETRICS & GYNECOLOGY
Payer: COMMERCIAL

## 2020-06-24 ENCOUNTER — OFFICE VISIT (OUTPATIENT)
Dept: MATERNAL FETAL MEDICINE | Facility: CLINIC | Age: 30
End: 2020-06-24
Attending: ADVANCED PRACTICE MIDWIFE
Payer: COMMERCIAL

## 2020-06-24 DIAGNOSIS — O26.90 PREGNANCY RELATED CONDITION, ANTEPARTUM: ICD-10-CM

## 2020-06-24 DIAGNOSIS — O36.5990 PREGNANCY AFFECTED BY FETAL GROWTH RESTRICTION: Primary | ICD-10-CM

## 2020-06-24 PROBLEM — O36.8390 NON-REASSURING ELECTRONIC FETAL MONITORING TRACING: Status: ACTIVE | Noted: 2020-06-24

## 2020-06-24 LAB
ABO + RH BLD: ABNORMAL
ABO + RH BLD: ABNORMAL
ALT SERPL W P-5'-P-CCNC: 11 U/L (ref 0–50)
AMPHETAMINES UR QL SCN: NEGATIVE
AST SERPL W P-5'-P-CCNC: 10 U/L (ref 0–45)
BASOPHILS # BLD AUTO: 0 10E9/L (ref 0–0.2)
BASOPHILS NFR BLD AUTO: 0.1 %
BLD GP AB INVEST PLASRBC-IMP: ABNORMAL
BLD GP AB SCN SERPL QL: ABNORMAL
BLOOD BANK CMNT PATIENT-IMP: ABNORMAL
BLOOD BANK CMNT PATIENT-IMP: ABNORMAL
CANNABINOIDS UR QL: ABNORMAL
COCAINE UR QL: NEGATIVE
CREAT SERPL-MCNC: 0.52 MG/DL (ref 0.52–1.04)
CREAT UR-MCNC: 110 MG/DL
CREAT UR-MCNC: 110 MG/DL
DIFFERENTIAL METHOD BLD: NORMAL
EOSINOPHIL # BLD AUTO: 0.1 10E9/L (ref 0–0.7)
EOSINOPHIL NFR BLD AUTO: 0.7 %
ERYTHROCYTE [DISTWIDTH] IN BLOOD BY AUTOMATED COUNT: 14.5 % (ref 10–15)
GFR SERPL CREATININE-BSD FRML MDRD: >90 ML/MIN/{1.73_M2}
HCT VFR BLD AUTO: 36.9 % (ref 35–47)
HGB BLD-MCNC: 12.4 G/DL (ref 11.7–15.7)
IMM GRANULOCYTES # BLD: 0 10E9/L (ref 0–0.4)
IMM GRANULOCYTES NFR BLD: 0.4 %
LYMPHOCYTES # BLD AUTO: 1.9 10E9/L (ref 0.8–5.3)
LYMPHOCYTES NFR BLD AUTO: 22.4 %
MCH RBC QN AUTO: 31.3 PG (ref 26.5–33)
MCHC RBC AUTO-ENTMCNC: 33.6 G/DL (ref 31.5–36.5)
MCV RBC AUTO: 93 FL (ref 78–100)
MONOCYTES # BLD AUTO: 0.7 10E9/L (ref 0–1.3)
MONOCYTES NFR BLD AUTO: 8.5 %
MRSA DNA SPEC QL NAA+PROBE: NEGATIVE
NEUTROPHILS # BLD AUTO: 5.8 10E9/L (ref 1.6–8.3)
NEUTROPHILS NFR BLD AUTO: 67.9 %
NRBC # BLD AUTO: 0 10*3/UL
NRBC BLD AUTO-RTO: 0 /100
OPIATES UR QL SCN: NEGATIVE
PCP UR QL SCN: NEGATIVE
PLATELET # BLD AUTO: 187 10E9/L (ref 150–450)
PROT UR-MCNC: 0.16 G/L
PROT/CREAT 24H UR: 0.15 G/G CR (ref 0–0.2)
RBC # BLD AUTO: 3.96 10E12/L (ref 3.8–5.2)
SARS-COV-2 PCR COMMENT: NORMAL
SARS-COV-2 RNA SPEC QL NAA+PROBE: NEGATIVE
SARS-COV-2 RNA SPEC QL NAA+PROBE: NORMAL
SPECIMEN EXP DATE BLD: ABNORMAL
SPECIMEN SOURCE: ABNORMAL
SPECIMEN SOURCE: NORMAL
WBC # BLD AUTO: 8.5 10E9/L (ref 4–11)
WET PREP SPEC: ABNORMAL

## 2020-06-24 PROCEDURE — 86850 RBC ANTIBODY SCREEN: CPT | Performed by: STUDENT IN AN ORGANIZED HEALTH CARE EDUCATION/TRAINING PROGRAM

## 2020-06-24 PROCEDURE — 84156 ASSAY OF PROTEIN URINE: CPT | Performed by: STUDENT IN AN ORGANIZED HEALTH CARE EDUCATION/TRAINING PROGRAM

## 2020-06-24 PROCEDURE — 86901 BLOOD TYPING SEROLOGIC RH(D): CPT | Performed by: STUDENT IN AN ORGANIZED HEALTH CARE EDUCATION/TRAINING PROGRAM

## 2020-06-24 PROCEDURE — 87186 SC STD MICRODIL/AGAR DIL: CPT | Performed by: STUDENT IN AN ORGANIZED HEALTH CARE EDUCATION/TRAINING PROGRAM

## 2020-06-24 PROCEDURE — 25000128 H RX IP 250 OP 636: Performed by: STUDENT IN AN ORGANIZED HEALTH CARE EDUCATION/TRAINING PROGRAM

## 2020-06-24 PROCEDURE — 80307 DRUG TEST PRSMV CHEM ANLYZR: CPT | Performed by: STUDENT IN AN ORGANIZED HEALTH CARE EDUCATION/TRAINING PROGRAM

## 2020-06-24 PROCEDURE — 86900 BLOOD TYPING SEROLOGIC ABO: CPT | Performed by: STUDENT IN AN ORGANIZED HEALTH CARE EDUCATION/TRAINING PROGRAM

## 2020-06-24 PROCEDURE — 12000001 ZZH R&B MED SURG/OB UMMC

## 2020-06-24 PROCEDURE — 76816 OB US FOLLOW-UP PER FETUS: CPT

## 2020-06-24 PROCEDURE — 87641 MR-STAPH DNA AMP PROBE: CPT | Performed by: STUDENT IN AN ORGANIZED HEALTH CARE EDUCATION/TRAINING PROGRAM

## 2020-06-24 PROCEDURE — 86870 RBC ANTIBODY IDENTIFICATION: CPT | Performed by: STUDENT IN AN ORGANIZED HEALTH CARE EDUCATION/TRAINING PROGRAM

## 2020-06-24 PROCEDURE — U0003 INFECTIOUS AGENT DETECTION BY NUCLEIC ACID (DNA OR RNA); SEVERE ACUTE RESPIRATORY SYNDROME CORONAVIRUS 2 (SARS-COV-2) (CORONAVIRUS DISEASE [COVID-19]), AMPLIFIED PROBE TECHNIQUE, MAKING USE OF HIGH THROUGHPUT TECHNOLOGIES AS DESCRIBED BY CMS-2020-01-R: HCPCS | Performed by: STUDENT IN AN ORGANIZED HEALTH CARE EDUCATION/TRAINING PROGRAM

## 2020-06-24 PROCEDURE — 99231 SBSQ HOSP IP/OBS SF/LOW 25: CPT | Performed by: NURSE PRACTITIONER

## 2020-06-24 PROCEDURE — 59025 FETAL NON-STRESS TEST: CPT | Mod: 26 | Performed by: OBSTETRICS & GYNECOLOGY

## 2020-06-24 PROCEDURE — 82565 ASSAY OF CREATININE: CPT | Performed by: STUDENT IN AN ORGANIZED HEALTH CARE EDUCATION/TRAINING PROGRAM

## 2020-06-24 PROCEDURE — 84450 TRANSFERASE (AST) (SGOT): CPT | Performed by: STUDENT IN AN ORGANIZED HEALTH CARE EDUCATION/TRAINING PROGRAM

## 2020-06-24 PROCEDURE — 76820 UMBILICAL ARTERY ECHO: CPT | Performed by: OBSTETRICS & GYNECOLOGY

## 2020-06-24 PROCEDURE — 25000132 ZZH RX MED GY IP 250 OP 250 PS 637: Performed by: STUDENT IN AN ORGANIZED HEALTH CARE EDUCATION/TRAINING PROGRAM

## 2020-06-24 PROCEDURE — 87210 SMEAR WET MOUNT SALINE/INK: CPT | Performed by: STUDENT IN AN ORGANIZED HEALTH CARE EDUCATION/TRAINING PROGRAM

## 2020-06-24 PROCEDURE — 99214 OFFICE O/P EST MOD 30 MIN: CPT | Mod: 25 | Performed by: OBSTETRICS & GYNECOLOGY

## 2020-06-24 PROCEDURE — 87653 STREP B DNA AMP PROBE: CPT | Performed by: STUDENT IN AN ORGANIZED HEALTH CARE EDUCATION/TRAINING PROGRAM

## 2020-06-24 PROCEDURE — 84460 ALANINE AMINO (ALT) (SGPT): CPT | Performed by: STUDENT IN AN ORGANIZED HEALTH CARE EDUCATION/TRAINING PROGRAM

## 2020-06-24 PROCEDURE — 36415 COLL VENOUS BLD VENIPUNCTURE: CPT | Performed by: STUDENT IN AN ORGANIZED HEALTH CARE EDUCATION/TRAINING PROGRAM

## 2020-06-24 PROCEDURE — 85025 COMPLETE CBC W/AUTO DIFF WBC: CPT | Performed by: STUDENT IN AN ORGANIZED HEALTH CARE EDUCATION/TRAINING PROGRAM

## 2020-06-24 PROCEDURE — 87591 N.GONORRHOEAE DNA AMP PROB: CPT | Performed by: STUDENT IN AN ORGANIZED HEALTH CARE EDUCATION/TRAINING PROGRAM

## 2020-06-24 PROCEDURE — 80349 CANNABINOIDS NATURAL: CPT | Performed by: STUDENT IN AN ORGANIZED HEALTH CARE EDUCATION/TRAINING PROGRAM

## 2020-06-24 PROCEDURE — 87640 STAPH A DNA AMP PROBE: CPT | Performed by: STUDENT IN AN ORGANIZED HEALTH CARE EDUCATION/TRAINING PROGRAM

## 2020-06-24 PROCEDURE — 87491 CHLMYD TRACH DNA AMP PROBE: CPT | Performed by: STUDENT IN AN ORGANIZED HEALTH CARE EDUCATION/TRAINING PROGRAM

## 2020-06-24 PROCEDURE — 99207 ZZC CONSULT E&M CHANGED TO SUBSEQUENT LEVEL: CPT | Performed by: NURSE PRACTITIONER

## 2020-06-24 RX ORDER — SODIUM CHLORIDE, SODIUM LACTATE, POTASSIUM CHLORIDE, CALCIUM CHLORIDE 600; 310; 30; 20 MG/100ML; MG/100ML; MG/100ML; MG/100ML
INJECTION, SOLUTION INTRAVENOUS CONTINUOUS
Status: DISCONTINUED | OUTPATIENT
Start: 2020-06-24 | End: 2020-06-25 | Stop reason: HOSPADM

## 2020-06-24 RX ORDER — DIPHENHYDRAMINE HYDROCHLORIDE 50 MG/ML
25 INJECTION INTRAMUSCULAR; INTRAVENOUS EVERY 6 HOURS PRN
Status: DISCONTINUED | OUTPATIENT
Start: 2020-06-24 | End: 2020-06-25 | Stop reason: HOSPADM

## 2020-06-24 RX ORDER — DIPHENHYDRAMINE HCL 25 MG
25 CAPSULE ORAL EVERY 6 HOURS PRN
Status: DISCONTINUED | OUTPATIENT
Start: 2020-06-24 | End: 2020-06-25 | Stop reason: HOSPADM

## 2020-06-24 RX ORDER — BETAMETHASONE SODIUM PHOSPHATE AND BETAMETHASONE ACETATE 3; 3 MG/ML; MG/ML
12 INJECTION, SUSPENSION INTRA-ARTICULAR; INTRALESIONAL; INTRAMUSCULAR; SOFT TISSUE EVERY 24 HOURS
Status: COMPLETED | OUTPATIENT
Start: 2020-06-24 | End: 2020-06-25

## 2020-06-24 RX ORDER — ACETAMINOPHEN 325 MG/1
975 TABLET ORAL EVERY 4 HOURS PRN
Status: DISCONTINUED | OUTPATIENT
Start: 2020-06-24 | End: 2020-06-25 | Stop reason: HOSPADM

## 2020-06-24 RX ORDER — DOCUSATE SODIUM 100 MG/1
100 CAPSULE, LIQUID FILLED ORAL 2 TIMES DAILY
Status: DISCONTINUED | OUTPATIENT
Start: 2020-06-24 | End: 2020-06-25 | Stop reason: HOSPADM

## 2020-06-24 RX ORDER — SIMETHICONE 80 MG
160 TABLET,CHEWABLE ORAL EVERY 4 HOURS PRN
Status: DISCONTINUED | OUTPATIENT
Start: 2020-06-24 | End: 2020-06-25 | Stop reason: HOSPADM

## 2020-06-24 RX ORDER — ONDANSETRON 2 MG/ML
4 INJECTION INTRAMUSCULAR; INTRAVENOUS EVERY 6 HOURS PRN
Status: DISCONTINUED | OUTPATIENT
Start: 2020-06-24 | End: 2020-06-25 | Stop reason: HOSPADM

## 2020-06-24 RX ORDER — FLUCONAZOLE 150 MG/1
150 TABLET ORAL ONCE
Status: COMPLETED | OUTPATIENT
Start: 2020-06-24 | End: 2020-06-24

## 2020-06-24 RX ADMIN — FLUCONAZOLE 150 MG: 150 TABLET ORAL at 18:47

## 2020-06-24 RX ADMIN — BETAMETHASONE SODIUM PHOSPHATE AND BETAMETHASONE ACETATE 12 MG: 3; 3 INJECTION, SUSPENSION INTRA-ARTICULAR; INTRALESIONAL; INTRAMUSCULAR at 14:03

## 2020-06-24 NOTE — DISCHARGE SUMMARY
[facility] Discharge Summary    Shanda Manrique MRN# [id number]   Age: 29 year old YOB: 1990     Date of Admission:  2020  Date of Discharge:  2020  Admitting Physician:  Mdeina Crump MD  Discharge Physician:  Tatyana Euceda MD    Admit Dx:   - Intrauterine pregnancy at 28w5d  - Severe IUGR, abnormal umbilical artery dopplers  - Gestational hypertension  - History of substance use  - Tobacco use  - Probation, wearing ankle bracelet  - History of behavioral problems, dismissed from clinic    Discharge Dx:  - Same as above  - Yeast vaginitis, treated    Admit HPI:  Shanda Manrique is a 29 year old  at 28w5d by 7w4d US who presented from [facility] clinic due to severe FGR and abnormal umbilical artery dopplers. Her pregnancy has been complicated by tobacco use, gestational hypertension, Rh negative, tobacco use, substance use, probation with house arrest. FHT was category I and appropriate for gestational on admission. Please see her admit H&P for full details of her PMH, PSH, Meds, Allergies and exam on admit.    Antepartum Course:   She received a course of betamethasone on -. Fetal monitoring was reassuring and appropriate for gestational age. She met criteria for gestational hypertension on admission, HELLP labs were normal and UPC 0.15. She was treated for yeast vaginitis. Her UA dopplers were stable on , BPP 8/8. She was discharged to home to obtain personal items.    Discharge Medications:     Review of your medicines      UNREVIEWED medicines. Ask your doctor about these medicines      Dose / Directions   cetirizine 10 MG tablet  Commonly known as:  zyrTEC  Used for:  Hives      Dose:  10 mg  Take 1 tablet (10 mg) by mouth daily  Quantity:  30 tablet  Refills:  1     prenatal multivitamin w/iron 27-0.8 MG tablet  Used for:  Encounter for supervision of normal pregnancy in second trimester, unspecified       Dose:  1 tablet  Take 1  tablet by mouth daily  Quantity:  90 tablet  Refills:  3     vitamin D3 50 mcg (2000 units) tablet  Commonly known as:  CHOLECALCIFEROL  Used for:  Vitamin D deficiency      Dose:  1 tablet  Take 1 tablet (2,000 Units) by mouth daily  Quantity:  90 tablet  Refills:  0            Discharge/Disposition:  Shanda Manrique was discharged to home in stable condition with the following instructions/medications:  1.) Return to antepartum this evening    Christiano Mckeon MD, MPH  OBGYN PGY-3  6/25/2020 6:55 PM       Physician Attestation   I, Tatyana Euceda, have seen and examined this patient.  I have reviewed the above note and agree with discharge plan as documented in the above note. Short discharge to allow patient to go home and collect her personal items.  She does not have anyone to bring them to her.  She plans to be back within 3 hours for long term admission until delivery.     Tatyana Euceda MD  Date of Service (when I saw the patient): 6/25/20

## 2020-06-24 NOTE — PROGRESS NOTES
"Please see \"Imaging\" tab under \"Chart Review\" for details of today's US.    Courtney Leiva, DO    "

## 2020-06-24 NOTE — CONSULTS
_       [facility]                      Neonatology Advanced Practice Antepartum Counseling Consult      I was asked to provide antepartum counseling for Shanda Manrique at the request of Medina Curmp MD secondary to 28 5/7 weeks gestation with severe IUGR and non-reassuring fetal dopplars. Pregnancy was otherwise complicated by tobacco use, gestational hypertension, Rh negative, tobacco use, substance use, probation with house arrest. Betamethasone was administered on . Ms. Manrique was counseled on the expected hospital course, potential risks, and outcomes associated with an infant born at approximately 29 weeks gestation. The counseling included: Initial delivery room stabilization, respiratory course, lung development, RDS and likely need for respiratory support, up to and including intubation with surfactant. Also discussed patent ductus arteriosus, retinopathy of prematurity, hyperbilirubinemia, hemodynamic support, infection (including NEC), low risk for intraventricular hemorrhage, nutrition, growth and development, and long term outcomes. Please feel free to call with any additional questions or concerns.          FRANC Bergeron 2020 4:25 PM     Advanced Practice Service    Intensive Care Unit  [facility]      Floor Time (min): 20  Face to Face Time (min): 15  Total Time (minutes): 15  More than 50% of my time was spent in direct, face to face, antepartum counseling with the above patient.

## 2020-06-24 NOTE — H&P
Antepartum History and Physical   2020  Shanda Manrique  [id number]      HPI: Shanda Manrique is a 29 year old  at 28w5d by 7w4d US who presented from [facility] clinic due to severe FGR and abnormal umbilical artery dopplers. Her pregnancy has been complicated by tobacco use, gestational hypertension, Rh negative, tobacco use, substance use, probation with house arrest.     She states that she is feeling well today. She reports regular fetal movement. She reports that she has previously used drugs (not willing to report what drugs other than marijuana), but not for several years. She currently is on probation for a drug offense, but not willing to specify. She complains of malodorous vaginal discharge. No itching or dysuria. She denies headache, vision changes, chest pain, shortness of breath, fever, chills, nausea, vomiting or other systemic complaints. She denies vaginal bleeding or loss of fluid.      Her pregnancy has been complicated by:  - Severe IUGR, abnormal umbilical artery dopplers  - Gestational hypertension  - History of substance use  - Tobacco use  - Probation, wearing ankle bracelet  - History of behavioral problems, dismissed from clinics    OBHX:   OB History    Para Term  AB Living   1 0 0 0 0 0   SAB TAB Ectopic Multiple Live Births   0 0 0 0 0      # Outcome Date GA Lbr Lucius/2nd Weight Sex Delivery Anes PTL Lv   1 Current                PMHX:  Obesity  Seasonal allergies    PSHX:  Denies history of surgery    Medications:   No current facility-administered medications on file prior to encounter.   cetirizine (ZYRTEC) 10 MG tablet, Take 1 tablet (10 mg) by mouth daily (Patient not taking: Reported on 2020)  Prenatal Vit-Fe Fumarate-FA (PRENATAL MULTIVITAMIN W/IRON) 27-0.8 MG tablet, Take 1 tablet by mouth daily (Patient not taking: Reported on 2020)  vitamin D3 (CHOLECALCIFEROL) 2000 units (50 mcg) tablet, Take 1 tablet (2,000 Units) by mouth daily (Patient not taking:  Reported on 6/16/2020)        No Known Allergies    Family History   Problem Relation Age of Onset     Diabetes Maternal Uncle      Breast Cancer Maternal Aunt        SocialHX:   Social History     Tobacco Use     Smoking status: Current Every Day Smoker     Types: Cigarettes     Smokeless tobacco: Never Used   Substance Use Topics     Alcohol use: Not Currently     Drug use: Yes     Types: Marijuana     Comment: once in awhile       ROS: 10-point ROS negative except as indicated in HPI.    Physical Exam:  Vitals:    06/24/20 1253   BP: (!) 132/92   Resp: 18   Temp: 98.2  F (36.8  C)   TempSrc: Oral     General: alert, oriented female, resting in bed in NAD  CV: regular rate and rhythm  Lungs: clear bilaterally, no crackles or wheezes.   Abdomen: soft, gravid, non-tender,  Obese  : small amount of thick, white discharge  Extremities: bilateral lower extremities non-tender with trace edema, ankle bracelet     Presentation: cephalic by US in clinic    FHT: baseline 140, moderate variability, no accelerations, no decelerations  [place]: no contractions    Prenatal ultrasounds:  Anatomy 5/20/20: 23w5d, EFW 659g (51%), anatomy wnl (unable to visualize all of head/brain), 3VC, placenta anterior, MVP 7.1  Follow up anatomy and growth 6/24/20: 28w5d, EFW 1060g (29%), AC 3%, MVP 3.7, BPP 8/8, UA doppler intermittently reversed end diastolic flow, MCA doppler wnl    Prenatal Labs:   Lab Results   Component Value Date    ABO B 06/24/2020    RH Neg 06/24/2020    AS Pos (A) 06/24/2020    HEPBANG non-reactive 01/20/2020    CHPCRT Negative 04/15/2020    GCPCRT Negative 04/15/2020    RUBELLAABIGG immune 01/20/2020    HGB 12.4 06/24/2020       GBS Status:   Collected    Labs:   Results for orders placed or performed during the hospital encounter of 06/24/20 (from the past 24 hour(s))   Asymptomatic COVID-19 Virus (Coronavirus) by PCR    Specimen: Nasopharyngeal   Result Value Ref Range    COVID-19 Virus PCR to [facility] - Source  Nasopharyngeal     COVID-19 Virus PCR to [facility] - Result       Test received-See reflex to IDDL test SARS CoV2 (COVID-19) Virus RT-PCR   SARS-CoV-2 COVID-19 Virus (Coronavirus) RT-PCR Nasopharyngeal    Specimen: Nasopharyngeal   Result Value Ref Range    SARS-CoV-2 Virus Specimen Source Nasopharyngeal     SARS-CoV-2 PCR Result NEGATIVE     SARS-CoV-2 PCR Comment       Testing was performed using the [employer] Xpress SARS-CoV-2 Assay on the Cepheid Gene-Xpert   Instrument Systems. Additional information about this Emergency Use Authorization (EUA)   assay can be found via the Lab Guide.     CBC with platelets differential   Result Value Ref Range    WBC 8.5 4.0 - 11.0 10e9/L    RBC Count 3.96 3.8 - 5.2 10e12/L    Hemoglobin 12.4 11.7 - 15.7 g/dL    Hematocrit 36.9 35.0 - 47.0 %    MCV 93 78 - 100 fl    MCH 31.3 26.5 - 33.0 pg    MCHC 33.6 31.5 - 36.5 g/dL    RDW 14.5 10.0 - 15.0 %    Platelet Count 187 150 - 450 10e9/L    Diff Method Automated Method     % Neutrophils 67.9 %    % Lymphocytes 22.4 %    % Monocytes 8.5 %    % Eosinophils 0.7 %    % Basophils 0.1 %    % Immature Granulocytes 0.4 %    Nucleated RBCs 0 0 /100    Absolute Neutrophil 5.8 1.6 - 8.3 10e9/L    Absolute Lymphocytes 1.9 0.8 - 5.3 10e9/L    Absolute Monocytes 0.7 0.0 - 1.3 10e9/L    Absolute Eosinophils 0.1 0.0 - 0.7 10e9/L    Absolute Basophils 0.0 0.0 - 0.2 10e9/L    Abs Immature Granulocytes 0.0 0 - 0.4 10e9/L    Absolute Nucleated RBC 0.0    ABO/Rh type and screen   Result Value Ref Range    ABO B     RH(D) Neg     Antibody Screen Pos (A)     Test Valid Only At          [facility]    Specimen Expires 06/27/2020     Blood Bank Comment       Delay in availability of Red Blood Cells called to  Miguelina Rayo in 4BOB 6/24/20 @1410 by RU      Antibody Identification ANTI-D   Probably due to prenatal Rhogam      ALT   Result Value Ref Range    ALT 11 0 - 50 U/L   AST   Result Value Ref Range    AST 10 0  - 45 U/L   Creatinine   Result Value Ref Range    Creatinine 0.52 0.52 - 1.04 mg/dL    GFR Estimate >90 >60 mL/min/[1.73_m2]    GFR Estimate If Black >90 >60 mL/min/[1.73_m2]   Drug Screen Urine /   Result Value Ref Range    Amphetamine Qual Urine Negative NEG^Negative    Cannabinoids Qual Urine Positive, sent to Drug Analysis for confirmation. (A) NEG^Negative    Cocaine Qual Urine Negative NEG^Negative    Opiates Qualitative Urine Negative NEG^Negative    Pcp Qual Urine Negative NEG^Negative   Protein  random urine with Creat Ratio   Result Value Ref Range    Protein Random Urine 0.16 g/L    Protein Total Urine g/gr Creatinine 0.15 0 - 0.2 g/g Cr   Wet prep    Specimen: Vagina   Result Value Ref Range    Specimen Description Vagina     Wet Prep Few  WBC'S seen       Wet Prep No motile Trichomonas seen     Wet Prep Few  Clue cells seen   (A)     Wet Prep Few  Yeast seen   (A)    Creatinine urine calculation only   Result Value Ref Range    Creatinine Urine 110 mg/dL   Creatinine random urine   Result Value Ref Range    Creatinine Urine Random 110 mg/dL       Assessment: 29 year old  at 28w5d by 7w5d US, here for severe fetal growth restriction with abnormal umbilical artery dopplers during pregnancy complicated by gestational hypertension, tobacco/marijuana use, obesity,     Plan:    # Fetal growth restriction with lagging AC  # Abnormal umbilical artery dopplers  Growth ultrasound today notable for EFW  29%, AC 3%, new diagnosis of growth restriction. Four weeks ago, , fetus previously normally grown EFW 51%.  - Admit to antepartum for fetal monitoring given severe growth restriction with intermittently reversed end diastolic flow  - CBC, T&S, COVID19, GBS ordered  - Betamethasone course ordered  - Will defer magnesium now, but will give 6g loading dose for fetal neuroprotection if changes in FHR and moving toward delivery.  - NICU consulted  - Reviewed risks including infection; injury to  nearby organs including bladder, ureter, uterus, fallopian tubes, ovaries, bowel, and major blood vessels and nerves; bleeding which may require transfusion. Consent form signed along with consent for blood transfusion.  - Continuous fetal monitoring  - Repeat UA doppler and BPP in AM    # History of drug use  # Tobacco use  - UDS ordered  - Social work ordered  - Declines nicotine replacement    # Gestational hypertension: Now 2 documented mild range BP. No symptoms currently of pre-eclampsia.  - HELLP labs and UPC ordered    # Vaginal discharge: Complains of malodorous discharge, has been treated for yeast and BV this pregnancy  - Wet prep collected, discharge white and thick  - GC/CT collected    # PNC: [facility] CNM for prenatal care  - Rh negative (s/p rhogam ), Rubella immune, HIV NR, RPR NR, HepB NR, GCT 90  - Placenta anterior  - Tdap ordered    Patient seen and care plan discussed under supervision of Dr. Crump.    Christiano Mckeon MD  Obstetrics and Gyncology, PGY-3  2020, 12:15 PM      Physician Attestation   I, FRANKY CRUMP MD, saw this patient with the resident and agree with the resident/fellow's findings and plan of care as documented in the note.      I personally reviewed vital signs, medications, labs and fetal tracing.    Key findings: 29 year old  at 28w5d admit for fetal growth restriction with doppler reversal of flow. AC lagging. This was routine ultrasound so patient is surprised about need for admit today. covid is negative.  Fetal tracing is appropriate for GA with baseline 145 moderate variability with accelerations, mild variables noted, category 1.     Will admit and plan daily ultrasound for doppler flow. Betamethasone today and tomorrow, plan magnesium if delivery needed prior to 32 weeks. NICU consult done. Discussed with pt that usually will get a good few days after betamethasone course but could be delivering in next week. Would need to be  as baby will  not tolerate labor with abnormal dopplers. Questions answered.    MRSA + so swab sent today is negative.    FRANKY BIRCH MD  Date of Service (when I saw the patient): 06/24/20

## 2020-06-24 NOTE — PROVIDER NOTIFICATION
06/24/20 1230   Provider Notification   Provider Name/Title Dr. Crump   Method of Notification Phone   Notification Reason Status Update   Pt arrived as direct admit from clinic for abnormal US. Per Dr. Crump keep pt on continuous EFM and give fist Beta. Pt denies pain, LOF, bleeding or contractions.

## 2020-06-24 NOTE — PLAN OF CARE
VSS, BP's mildly elevated. Pt denies HA, RUQ pain or visual disturbances. 2+ reflexes. EFM appropriate for gestation, no contractions present. First Beta given at 1400, GBS collected and pending. NICU consult complete. BPP scheduled for tomorrow AM. Continue plan of care.

## 2020-06-24 NOTE — NURSING NOTE
Pt at [facility] for growth ultrasound- see report from Dr. Leiva.  Fetus growth restricted with abnl dopplers.  Dr. Leiva in to speak with pt, recommending inpt admission.  Pt agreeable.  Writer notified domonique Darden RN.  Dr. Leiva to call on-call MD.  Pt walked to [facility] by writer.

## 2020-06-25 ENCOUNTER — HOSPITAL ENCOUNTER (INPATIENT)
Dept: ULTRASOUND IMAGING | Facility: CLINIC | Age: 30
End: 2020-06-25
Attending: OBSTETRICS & GYNECOLOGY
Payer: COMMERCIAL

## 2020-06-25 ENCOUNTER — HOSPITAL ENCOUNTER (INPATIENT)
Facility: CLINIC | Age: 30
LOS: 7 days | Discharge: HOME | End: 2020-07-02
Attending: OBSTETRICS & GYNECOLOGY | Admitting: OBSTETRICS & GYNECOLOGY
Payer: COMMERCIAL

## 2020-06-25 VITALS
RESPIRATION RATE: 18 BRPM | WEIGHT: 282.8 LBS | DIASTOLIC BLOOD PRESSURE: 90 MMHG | TEMPERATURE: 98.1 F | BODY MASS INDEX: 41.76 KG/M2 | SYSTOLIC BLOOD PRESSURE: 148 MMHG

## 2020-06-25 DIAGNOSIS — Z98.891 S/P CESAREAN SECTION: Primary | ICD-10-CM

## 2020-06-25 DIAGNOSIS — O36.5990 IUGR (INTRAUTERINE GROWTH RESTRICTION) AFFECTING CARE OF MOTHER: ICD-10-CM

## 2020-06-25 DIAGNOSIS — O14.10 SEVERE PRE-ECLAMPSIA, ANTEPARTUM: ICD-10-CM

## 2020-06-25 LAB
AMPHETAMINES UR QL SCN: NEGATIVE
C TRACH DNA SPEC QL NAA+PROBE: NEGATIVE
CANNABINOIDS UR QL: ABNORMAL
COCAINE UR QL: NEGATIVE
CREAT UR-MCNC: 153 MG/DL
GP B STREP DNA SPEC QL NAA+PROBE: POSITIVE
N GONORRHOEA DNA SPEC QL NAA+PROBE: NEGATIVE
OPIATES UR QL SCN: NEGATIVE
PCP UR QL SCN: NEGATIVE
SPECIMEN SOURCE: ABNORMAL
SPECIMEN SOURCE: NORMAL
SPECIMEN SOURCE: NORMAL

## 2020-06-25 PROCEDURE — 80307 DRUG TEST PRSMV CHEM ANLYZR: CPT | Performed by: STUDENT IN AN ORGANIZED HEALTH CARE EDUCATION/TRAINING PROGRAM

## 2020-06-25 PROCEDURE — 76819 FETAL BIOPHYS PROFIL W/O NST: CPT

## 2020-06-25 PROCEDURE — 25000132 ZZH RX MED GY IP 250 OP 250 PS 637: Performed by: STUDENT IN AN ORGANIZED HEALTH CARE EDUCATION/TRAINING PROGRAM

## 2020-06-25 PROCEDURE — 12000001 ZZH R&B MED SURG/OB UMMC

## 2020-06-25 PROCEDURE — 25000128 H RX IP 250 OP 636: Performed by: STUDENT IN AN ORGANIZED HEALTH CARE EDUCATION/TRAINING PROGRAM

## 2020-06-25 PROCEDURE — 76820 UMBILICAL ARTERY ECHO: CPT | Performed by: OBSTETRICS & GYNECOLOGY

## 2020-06-25 PROCEDURE — 80349 CANNABINOIDS NATURAL: CPT | Performed by: STUDENT IN AN ORGANIZED HEALTH CARE EDUCATION/TRAINING PROGRAM

## 2020-06-25 RX ORDER — LIDOCAINE 40 MG/G
CREAM TOPICAL
Status: DISCONTINUED | OUTPATIENT
Start: 2020-06-25 | End: 2020-06-29

## 2020-06-25 RX ORDER — DIPHENHYDRAMINE HCL 25 MG
25 CAPSULE ORAL EVERY 6 HOURS PRN
Status: DISCONTINUED | OUTPATIENT
Start: 2020-06-25 | End: 2020-06-27

## 2020-06-25 RX ORDER — DIPHENHYDRAMINE HYDROCHLORIDE 50 MG/ML
25 INJECTION INTRAMUSCULAR; INTRAVENOUS EVERY 6 HOURS PRN
Status: DISCONTINUED | OUTPATIENT
Start: 2020-06-25 | End: 2020-06-27

## 2020-06-25 RX ORDER — PRENATAL VIT/IRON FUM/FOLIC AC 27MG-0.8MG
1 TABLET ORAL DAILY
Status: DISCONTINUED | OUTPATIENT
Start: 2020-06-25 | End: 2020-06-29

## 2020-06-25 RX ORDER — AMOXICILLIN 250 MG
2 CAPSULE ORAL 2 TIMES DAILY PRN
Status: DISCONTINUED | OUTPATIENT
Start: 2020-06-25 | End: 2020-06-29

## 2020-06-25 RX ORDER — ONDANSETRON 2 MG/ML
4 INJECTION INTRAMUSCULAR; INTRAVENOUS EVERY 6 HOURS PRN
Status: DISCONTINUED | OUTPATIENT
Start: 2020-06-25 | End: 2020-06-29

## 2020-06-25 RX ORDER — AMOXICILLIN 250 MG
1 CAPSULE ORAL 2 TIMES DAILY PRN
Status: DISCONTINUED | OUTPATIENT
Start: 2020-06-25 | End: 2020-06-29

## 2020-06-25 RX ORDER — ACETAMINOPHEN 325 MG/1
975 TABLET ORAL EVERY 4 HOURS PRN
Status: DISCONTINUED | OUTPATIENT
Start: 2020-06-25 | End: 2020-06-29

## 2020-06-25 RX ADMIN — ACETAMINOPHEN 975 MG: 325 TABLET, FILM COATED ORAL at 12:58

## 2020-06-25 RX ADMIN — BETAMETHASONE SODIUM PHOSPHATE AND BETAMETHASONE ACETATE 12 MG: 3; 3 INJECTION, SUSPENSION INTRA-ARTICULAR; INTRALESIONAL; INTRAMUSCULAR at 13:01

## 2020-06-25 RX ADMIN — PRENATAL VITAMINS-IRON FUMARATE 27 MG IRON-FOLIC ACID 0.8 MG TABLET 1 TABLET: at 21:02

## 2020-06-25 NOTE — DISCHARGE INSTRUCTIONS
Discharge Instruction for Undelivered Patients      You were seen for: Severe IUGR, abnormal fetal dopplers  We Consulted: Dr. Euceda, Dr. Mckeon, Dr. Jin  You had (Test or Medicine):Fetal monitoring, Betamethasone, Ultrasound      Diet:   Drink 8 to 12 glasses of liquids (milk, juice, water) every day.  You may eat meals and snacks.     Activity:  Call your doctor or nurse midwife if your baby is moving less than usual.     Call your provider if you notice:  Swelling in your face or increased swelling in your hands or legs.  Headaches that are not relieved by Tylenol (acetaminophen).  Changes in your vision (blurring: seeing spots or stars.)  Nausea (sick to your stomach) and vomiting (throwing up).   Weight gain of 5 pounds or more per week.  Heartburn that doesn't go away.  Signs of bladder infection: pain when you urinate (use the toilet), need to go more often and more urgently.  The bag of mercedes (rupture of membranes) breaks, or you notice leaking in your underwear.  Bright red blood in your underwear.  Abdominal (lower belly) or stomach pain.  For first baby: Contractions (tightening) less than 5 minutes apart for one hour or more.  Second (plus) baby: Contractions (tightening) less than 10 minutes apart and getting stronger.  *If less than 34 weeks: Contractions (tightenings) more than 6 times in one hour.  Increase or change in vaginal discharge (note the color and amount)    Follow-up:  Return to [facility] Antepartum unit by 1900 6/25/20

## 2020-06-25 NOTE — PROGRESS NOTES
"Please see \"Imaging\" tab under \"Chart Review\" for details of today's US on Antepartum Hernandez  Harsha Jin MD  Maternal-Fetal Medicine      "

## 2020-06-25 NOTE — PROGRESS NOTES
Antepartum Progress Note    S: Patient feeling overwhelmed by being admitted long-term without having any personal items with her. She would like to go home briefly to get some supplies and personal items. She denies any abdominal pain, cramping, contractions, or vaginal bleeding. No leakage of fluid. Normal fetal movement.     She reports that she has not been craving cigarettes since admission, does not want nicotine replacement at this time.     O:   Patient Vitals for the past 24 hrs:   BP Temp Temp src Resp Weight   20 0744 120/69 98  F (36.7  C) Oral -- --   20 0710 -- -- -- -- 128.3 kg (282 lb 12.8 oz)   20 0332 124/58 97.7  F (36.5  C) Oral 16 --   20 2325 111/65 98.1  F (36.7  C) Oral 18 --   20 1953 131/81 98.2  F (36.8  C) Oral 18 --   20 1728 -- -- -- -- 129.8 kg (286 lb 3.2 oz)   20 1553 138/78 98.4  F (36.9  C) Oral 16 --   20 1253 (!) 132/92 98.2  F (36.8  C) Oral 18 --     Gen: Appears, NAD  CV: Regular rate  Pulm: non-labored breathing  Abd: Gravid, non-tender, skin lesion just below umbilicus  : deferred  Ext: Warm, well perfused, no edema bilaterally.     FHT: 140 baseline, moderate variability, + accels, no decels  TOCO: no contractions    Imaging: UA dopplers intermittently reversed, BPP 8    A/P: Shanda Manrique is a 29 year old  female at 28w6d by 7w4d US, HD#2 admitted for severe fetal growth restriction with abnormal umbilical artery dopplers during pregnancy complicated by gestational hypertension, tobacco/marijuana use, obesity.    # Fetal growth restriction with lagging AC  # Abnormal umbilical artery dopplers  Growth ultrasound  notable for EFW  29%, AC 3%, new diagnosis of growth restriction. On , fetus previously normally grown EFW 51%.  - UA dopplers stable (intermittently reversed), BPP  today  - s/p betamethasone ,   - GBS positive  - s/p NICU consult  - Continue daily doppler and BPP  - TID monitoring     #  History of drug use  # Tobacco use  - UDS + marijuana  - Social work ordered  - Declines nicotine replacement     # Gestational hypertension:   - UPC 0.15 (6/24), HELLP labs wnl  - Blood pressure normal to mild range    # Vaginal discharge:   - Wet prep+yeast, s/p diflucan x1  6/24  - GC/CT negative     # PNC: [facility] CNM for prenatal care  - Rh negative (s/p rhogam 6/19), Rubella immune, HIV NR, RPR NR, HepB NR, GCT 90  - Placenta anterior  - Tdap ordered, but not given --patient declining at this time    Dispo: plan to discharge Adriana to home so that she can gather supplies and personal items for her antepartum stay. She agrees to return this evening.    Patient seen and care plan discussed with Dr. Euceda.     Christiano Mckeon MD  OBGYN Resident PGY-3  06/25/2020 8:46 AM     Physician Attestation   I, Tatyana Euceda MD, saw this patient with the resident and agree with the resident/fellow's findings and plan of care as documented in the note.      I personally reviewed vital signs, medications, labs, imaging and fetal heart tones and toco.    Key findings: patient admitted yesterday for non-reassuring fetal status with severe IUGR and abnormal dopplers.   She has noted normal FM and EFM has been reassuring.  NST is reassuring for current gestational age.   Her main concern today is needing to go home and get her personal supplies. She came for a US and prenatal visit yesterday and un-expectantly ended up getting hospitalized. Plan per Dr Jin is to allow discharge to go and get her things and readmit this evening. No need to re-swab her for covid.    Patient has completed her 2 BMZ shots and dopplers have been stable today.  Continue with in patient management and daily dopplers.  Hope to continue pregnancy to 34 wks.     Tatyana Euceda MD  Date of Service (when I saw the patient): 6/25/20

## 2020-06-25 NOTE — LETTER
Health Information Management Services               Recipient:  [facility] Electronic Home Monitoring    Sender:  Beverly Enriquez [facility]  Clinical   Maternal Child Health  Phone: [phone]    Date: July 2, 2020  Patient Name:  Shanda Manrique  Routing Message:    Please see confirmation of discharge today attached, and let me know if you need additional information.  Thank you!    The documents accompanying this notice contain confidential information belonging to the sender.  This information is intended only for the use of the individual or entity named above.  The authorized recipient of this information is prohibited from disclosing this information to any other party and is required to destroy the information after its stated need has been fulfilled, unless otherwise required by state law.      If you are not the intended recipient, you are hereby notified that any disclosure, copy, distribution or action taken in reliance on the contents of these documents is strictly prohibited.  If you have received this document in error, please return it by fax to [phone] with a note on the cover sheet explaining why you are returning it (e.g. not your patient, not your provider, etc.).  If you need further assistance, please call [employer] Centralized Transcription at [phone].  Documents may also be returned by mail to [employer], , [address], [place], [place] [address].

## 2020-06-25 NOTE — PROVIDER NOTIFICATION
"   06/25/20 1459   Provider Notification   Provider Name/Title Dr. Euceda   Method of Notification Electronic Page   Request Evaluate in Person   Notification Reason Patient Request   Pt very upset that she cannot leave the unit and wants to leave. Would like to talk with MD. Resident in OR. Thanks    1500: Dr. Mckeon at bedside talking with pt. Pt very tearful stating she wasn't told in the clinic that she would be in the hospital to stay the duration of her pregnancy. Per pt she does not have a reliable person to drop off personal belongings and wants to go home to \"get stuff in order.\" MD discussed with patient that it is in the best interest of baby to pt to remain inpatient. Dr. Euceda coming to bedside to discuss with pt.       "

## 2020-06-25 NOTE — UTILIZATION REVIEW
Inpatient appropriate    Admission Status; Secondary Review Determination      Under the authority of the Utilization Management Committee, the utilization review process indicated a secondary review on the above patient. The review outcome is based on review of the medical records, discussions with staff, and applying clinical experience noted on the date of the review.    (x) Inpatient Status Appropriate - This patient's medical care is consistent with medical management for inpatient care and reasonable inpatient medical practice.    RATIONALE FOR DETERMINATION  Shanda Manrique is a 29 year old  at 28w5d was admitted to [facility] from [facility] clinic due to severe FGR and abnormal umbilical artery dopplers. She also has gestational hypertension.  The plan is for daily ultrasound for Doppler flow.  Also started on betamethasone.  NICU also has been consulted.  This is a high risk situation that requires inpatient hospital stay for continuous fetal monitoring, daily Doppler and monitoring of labs for HELLP.  Discussed with Dr. Dai, she will likely need to stay in the hospital until she delivers or a  section is undertaken.  Inpatient care is appropriate at this time.    At the time of admission with the information available to the attending physician more than 2 nights Hospital complex care was anticipated, based on patient risk of adverse outcome if treated as outpatient and complex care required. Inpatient admission is appropriate based on the Medicare guidelines.     This document was produced using voice recognition software      The information on this document is developed by the utilization review team in order for the business office to ensure compliance. This only denotes the appropriateness of proper admission status and does not reflect the quality of care rendered.  The definitions of Inpatient Status and Observation Status used in making the determination above  are those provided in the CMS Coverage Manual, Chapter 1 and Chapter 6, section 70.4.    Sincerely,    Utilization Review  Physician Advisor  [facility].

## 2020-06-25 NOTE — PROGRESS NOTES
Patient is on house arrest and states she needs verification that she has been admitted to the hospital.  Patient requesting that I call and speak to the monitoring and communication center to verify that she has been admitted to the hospital.  Patient provided phone number to the facility and upon patient request this RN informed the monitoring and communication center that patient was admitted to the hospital.  Patient has also called to report her admission to hospital.

## 2020-06-25 NOTE — PLAN OF CARE
Patient VSS, afebrile.  Patient denies contractions, cramping, bleeding and LOF.  Patient slept comfortably through the night and has no other complaints.  Patient denies headache, visual disturbances and epigastric pain.  Paitent denies pain. FHT's appropriate for gestational age.   Plan of care reviewed with patient, understanding verbalized.  Continue with current plan of care.

## 2020-06-25 NOTE — PROVIDER NOTIFICATION
06/25/20 1600   Provider Notification   Provider Name/Title Dr. Mike Rodriguez   Method of Notification At Bedside   Notification Reason Other (Comment)   MD's at bedside to discuss plan of care. Plan made to discharge pt to home for her to obtain belongings and return to hospital tonight. RN called [facility] monitoring to let them know pt would be leaving the hospital within the next half an hour to go home and return to the hospital around 1900. Pt very appreciative of ability to leave facility. IV removed and discharge education completed. Pt waiting for family member to pick her up. Discharged antepartum at 1700.

## 2020-06-26 ENCOUNTER — HOSPITAL ENCOUNTER (INPATIENT)
Dept: ULTRASOUND IMAGING | Facility: CLINIC | Age: 30
End: 2020-06-26
Payer: COMMERCIAL

## 2020-06-26 PROCEDURE — 76820 UMBILICAL ARTERY ECHO: CPT | Performed by: OBSTETRICS & GYNECOLOGY

## 2020-06-26 PROCEDURE — 99232 SBSQ HOSP IP/OBS MODERATE 35: CPT | Mod: 25 | Performed by: OBSTETRICS & GYNECOLOGY

## 2020-06-26 PROCEDURE — 76819 FETAL BIOPHYS PROFIL W/O NST: CPT

## 2020-06-26 PROCEDURE — 59025 FETAL NON-STRESS TEST: CPT | Mod: 26 | Performed by: OBSTETRICS & GYNECOLOGY

## 2020-06-26 PROCEDURE — 25000132 ZZH RX MED GY IP 250 OP 250 PS 637: Performed by: STUDENT IN AN ORGANIZED HEALTH CARE EDUCATION/TRAINING PROGRAM

## 2020-06-26 PROCEDURE — 12000001 ZZH R&B MED SURG/OB UMMC

## 2020-06-26 RX ADMIN — ACETAMINOPHEN 975 MG: 325 TABLET, FILM COATED ORAL at 06:49

## 2020-06-26 RX ADMIN — PRENATAL VITAMINS-IRON FUMARATE 27 MG IRON-FOLIC ACID 0.8 MG TABLET 1 TABLET: at 09:13

## 2020-06-26 NOTE — H&P
[facility]  Antepartum History and Physical      Shanda Manrique MRN# [id number]   Age: 29 year old YOB: 1990     CC:  Fetal monitoring    HPI:  Ms. Shanda Manrique is a 29 year old  at 28w6d by 7w4d US, who was admitted on  from clinic due to severe FGR and abnormal UA dopplers. She discharged earlier this evening briefly to go home and get her things for a prolonged stay. She now returns without complaints. Denies contractions, LOF, vaginal bleeding. Active fetal movement. No HA, vision changes, chest pain, shortness of breath, urinary or bowel issues, increased swelling.     Pregnancy Complications:  - Severe IUGR, abnormal umbilical artery dopplers  - Gestational hypertension  - History of substance use  - Tobacco use  - Probation, wearing ankle bracelet  - History of behavioral problems, dismissed from clinics  - H/o MRSA    Prenatal Labs:   Lab Results   Component Value Date    ABO B 2020    RH Neg 2020    AS Pos (A) 2020    HEPBANG non-reactive 2020    CHPCRT Negative 2020    GCPCRT Negative 2020    RUBELLAABIGG immune 2020    HGB 12.4 2020     GBS Status:   Lab Results   Component Value Date    GBS Positive (A) 2020     OB History  OB History    Para Term  AB Living   1 0 0 0 0 0   SAB TAB Ectopic Multiple Live Births   0 0 0 0 0      # Outcome Date GA Lbr Lucius/2nd Weight Sex Delivery Anes PTL Lv   1 Current                PMHx:   History reviewed. No pertinent past medical history.     PSHx:   History reviewed. No pertinent surgical history.     Meds:   Medications Prior to Admission   Medication Sig Dispense Refill Last Dose     cetirizine (ZYRTEC) 10 MG tablet Take 1 tablet (10 mg) by mouth daily (Patient not taking: Reported on 2020) 30 tablet 1      Prenatal Vit-Fe Fumarate-FA (PRENATAL MULTIVITAMIN W/IRON) 27-0.8 MG tablet Take 1 tablet by mouth daily (Patient not taking: Reported on  6/16/2020) 90 tablet 3      vitamin D3 (CHOLECALCIFEROL) 2000 units (50 mcg) tablet Take 1 tablet (2,000 Units) by mouth daily (Patient not taking: Reported on 6/16/2020) 90 tablet 0      Allergies:  No Known Allergies     FmHx:   Family History   Problem Relation Age of Onset     Diabetes Maternal Uncle      Breast Cancer Maternal Aunt      SocHx:   Social History     Socioeconomic History     Marital status: Single     Spouse name: Not on file     Number of children: Not on file     Years of education: Not on file     Highest education level: Not on file   Occupational History     Not on file   Social Needs     Financial resource strain: Not on file     Food insecurity     Worry: Not on file     Inability: Not on file     Transportation needs     Medical: Not on file     Non-medical: Not on file   Tobacco Use     Smoking status: Current Every Day Smoker     Types: Cigarettes     Smokeless tobacco: Never Used   Substance and Sexual Activity     Alcohol use: Not Currently     Drug use: Yes     Types: Marijuana     Comment: once in awhile     Sexual activity: Yes     Partners: Male   Lifestyle     Physical activity     Days per week: Not on file     Minutes per session: Not on file     Stress: Not on file   Relationships     Social connections     Talks on phone: Not on file     Gets together: Not on file     Attends Jew service: Not on file     Active member of club or organization: Not on file     Attends meetings of clubs or organizations: Not on file     Relationship status: Not on file     Intimate partner violence     Fear of current or ex partner: Not on file     Emotionally abused: Not on file     Physically abused: Not on file     Forced sexual activity: Not on file   Other Topics Concern     Not on file   Social History Narrative     Not on file     ROS:   Complete 10-point ROS negative except as noted in HPI.    PE:  Vit:   Patient Vitals for the past 4 hrs:   BP Temp Resp   06/25/20 2021 (!) 140/79  98.9  F (37.2  C) 18      Gen: Well-appearing, NAD, comfortable   CV: Well perfused  Pulm: Non-labored breathing  Abd: Soft, gravid, non-tender, obese  Ext: Trace LE edema b/l    Cx: Deferred    Pres:  Cephalic by US   Memb: Intact              FHT: Baseline 140, moderate variability, 10x10 accelerations, absent decelerations   [place]: 0 contractions in 10 minutes      Ultrasounds:  Anatomy 20: 23w5d, EFW 659g (51%), anatomy wnl (unable to visualize all of head/brain), 3VC, placenta anterior, MVP 7.1  Follow up anatomy and growth 20: 28w5d, EFW 1060g (29%), AC 3%, MVP 3.7, BPP 8/8, UA doppler intermittently reversed end diastolic flow, MCA doppler wnl   UA dopplers with intermittent reversal of EDF, BPP 8/8    Labs:   No new labs    Assessment  Ms. Shanda Manrique is a 29 year old , at 28w6d by 7w4d US, who is admitted for severe fetal growth restriction with abnormal umbilical artery dopplers. Pregnancy complicated by gestational hypertension, tobacco/marijuana use, obesity, Rh negative status, GBS positive status.    Plan  #FGR with lagging AC  #Abnormal UA dopplers  Growth ultrasound today notable for EFW  29%, AC 3%, new diagnosis of growth restriction. Fetus previously normally grown  EFW 51%.  - Re-admit to antepartum for fetal monitoring given severe growth restriction with intermittently reversed end diastolic flow  - Most recent doppler stable   - S/p BMZ -25  - S/p NICU consult, SW consulted  - S/p CS consent  - TID monitoring, daily BPP/doppler     #History of drug use  #Tobacco use  - UDS ordered  - Social work ordered  - Declines nicotine replacement     #Gestational hypertension  - UPC 0.15 (), HELLP labs wnl  - Blood pressure mild range    #Vaginal discharge  - Wet prep+yeast, s/p diflucan x1    - GC/CT negative     # PNC: [facility] CN[facility] for prenatal care  - Rh negative (s/p rhogam ), Rubella immune, HIV NR, RPR NR, HepB NR, GCT 90  - Placenta anterior  - Tdap:  patient declined  - GBS positive    The patient was discussed with Dr. Euceda who is in agreement with the treatment plan.    Lemuel Landeros MD  OB/GYN Resident, PGY-3  6/25/2020 11:56 PM     I have discussed patient with resident and saw her earlier today prior to her discharge.  She is back now and ready for prolonged admission until birth of her baby.  I agree with above note and plan.     Tatyana Euceda MD

## 2020-06-26 NOTE — UTILIZATION REVIEW
Admission Status; Secondary Review Determination    Under the authority of the Utilization Management Committee, the utilization review process indicated a secondary review on the above patient. The review outcome is based on review of the medical records, discussions with staff, and applying clinical experience noted on the date of the review.    (x) Inpatient Status Appropriate - This patient's medical care is consistent with medical management for inpatient care and reasonable inpatient medical practice.    RATIONALE FOR DETERMINATION: 29-year-old female admitted to the hospital as inpatient for severe fetal growth restriction with abnormal umbilical artery Doppler.  Patient required return to residents to obtain her belongings for long hospital stay.  Upon return to hospital she is still appropriate as previously noted for inpatient care.    At the time of admission with the information available to the attending physician more than 2 nights Hospital complex care was anticipated, based on patient risk of adverse outcome if treated as outpatient and complex care required. Inpatient admission is appropriate based on the Medicare guidelines.    This document was produced using voice recognition software    The information on this document is developed by the utilization review team in order for the business office to ensure compliance. This only denotes the appropriateness of proper admission status and does not reflect the quality of care rendered.    The definitions of Inpatient Status and Observation Status used in making the determination above are those provided in the CMS Coverage Manual, Chapter 1 and Chapter 6, section 70.4.    Sincerely,    Meng Velazquez MD  Utilization Review  Physician Advisor  [facility].

## 2020-06-26 NOTE — PLAN OF CARE
Patient VSS, afebrile.  Patient denies contractions, cramping, bleeding and LOF.  Patient slept comfortably through the night and has no other complaints.  FHT's appropriate for gestational age.   Plan of care reviewed with patient, understanding verbalized.  Continue with current plan of care.

## 2020-06-26 NOTE — DISCHARGE SUMMARY
[facility] Discharge Summary    Shanda Manrique MRN# [id number]   Age: 29 year old YOB: 1990     Date of Admission:  2020  Date of Discharge:  20  Admitting Physician:  Tatyana Euceda MD  Discharge Physician:  Shelia Goldberg MD     Admission Diagnosis:  - IUP at 28w6d  - Severe IUGR, abnormal umbilical artery dopplers  - Gestational hypertension  - Morbid Obesity  - GBS colonization  - History of substance use  - Tobacco use  - Probation, wearing ankle bracelet  - History of behavioral problems, dismissed from clinics  - H/o MRSA    Discharge Diagnosis:  - S/p  section  - Pre-eclampsia with severe features  - Category II FHT RFD  - Morbid Obesity  - GBS colonization  - History of substance use  - Tobacco use  - Probation, wearing ankle bracelet  - History of behavioral problems, dismissed from clinics  - H/o MRSA    Procedures:    - Primary low segment transverse  section via Pfannenstiel incision with double layer uterine closure  - TAP block  - Rhogam     Consultations:    - Anesthesia   - Social Work     Medications prior to admission:  Medications Prior to Admission   Medication Sig Dispense Refill Last Dose     cetirizine (ZYRTEC) 10 MG tablet Take 1 tablet (10 mg) by mouth daily (Patient not taking: Reported on 2020) 30 tablet 1      Prenatal Vit-Fe Fumarate-FA (PRENATAL MULTIVITAMIN W/IRON) 27-0.8 MG tablet Take 1 tablet by mouth daily (Patient not taking: Reported on 2020) 90 tablet 3      vitamin D3 (CHOLECALCIFEROL) 2000 units (50 mcg) tablet Take 1 tablet (2,000 Units) by mouth daily (Patient not taking: Reported on 2020) 90 tablet 0        Brief History of Presentation:    Ms. Shanda Manrique is a 29 year old  at 28w6d by 7w4d US, who was admitted on  from clinic due to severe FGR and abnormal UA dopplers. She discharged earlier this evening briefly to go home and get her things for a prolonged stay. She now  returns without complaints. Denies contractions, LOF, vaginal bleeding. Active fetal movement. No HA, vision changes, chest pain, shortness of breath, urinary or bowel issues, increased swelling.     Hospital Course:    See previous discharge summary for hospital course 3/24-3/25.    She was admitted for inpatient fetal monitoring. Daily BPPs and UA dopplers were performed. She developed preeclampsia with severe features based on blood pressure. IOL was recommended. After prolonged induction, cervix remained closed and she developed a Cat II FHT.  delivery was recommended.     Operative Course:  Surgery was uncomplicated. QBL from the delivery was 1450 mL. Please see her  Section Operative Note for full details regarding her delivery.    Operative Findings:   1. No recto-fascial or intraabdominal adhesions  2. Port wine stained amniotic fluid. Placenta intact with 3 vessel cord.  3. Liveborn male infant in cephalic presentation. Apgars 6 at 1 minute & 9 at 5 minutes. Weight 1060g.  4. Arterial cord pH 7.30, base deficit 0.2. Venous cord pH 7.34, base deficit 0.9.  5. Normal uterus, fallopian tubes, and ovaries.     Postoperative Course:  She completed 24 hours of magnesium postpartum. She was started on procardia 30 mg with improvement in BPs. At time of discharge, BPs were normotensive. Her house arrest/probation ankle bracelet was cut during her . SW was consulted and contacted [facility] in that regard. On POD#3, she was meeting all of her postpartum goals and deemed stable for discharge. She was voiding without difficulty, tolerating a regular diet without nausea and vomiting, her pain was well controlled on oral pain medicines and her lochia was appropriate. Her hemoglobin prior to delivery was 11.8 and after delivery was 10.4. Her Rh status was negative and Rhogam was given. She was discharged on postpartum day 3.     Discharge/Disposition:  Discharged to home in stable condition  with the following instructions/medications:  1) Call for temperature > 100.4, bright red vaginal bleeding >1 pad an hour x 2 hours, foul smelling vaginal discharge, pain not controlled by usual oral pain meds, persistent nausea and vomiting not controlled on medications, drainage or redness from incision site  2) She declined contraception.  3) For feeding she decided to breastfeed.  4) She was instructed to follow-up with her primary OB in 6 weeks for a routine postpartum visit and in 1 week for BP check.  5) Discharge activity:  No heavy lifting >15 lbs or strenuous activity for 6 weeks, pelvic rest for 6 weeks, no driving or operating machinery while on narcotics.    Discharge Medications:     Review of your medicines      START taking      Dose / Directions   acetaminophen 325 MG tablet  Commonly known as:  TYLENOL      Dose:  650 mg  Take 2 tablets (650 mg) by mouth every 6 hours as needed for mild pain Start after Delivery.  Quantity:  100 tablet  Refills:  0     ibuprofen 600 MG tablet  Commonly known as:  ADVIL/MOTRIN      Dose:  600 mg  Take 1 tablet (600 mg) by mouth every 6 hours as needed for moderate pain Start after delivery  Quantity:  60 tablet  Refills:  0     NIFEdipine ER 30 MG 24 hr tablet  Commonly known as:  ADALAT CC  Used for:  Severe pre-eclampsia, antepartum      Dose:  30 mg  Take 1 tablet (30 mg) by mouth every 24 hours  Quantity:  45 tablet  Refills:  0     * oxyCODONE 5 MG tablet  Commonly known as:  ROXICODONE      Dose:  5 mg  Take 1 tablet (5 mg) by mouth every 4 hours as needed for moderate to severe pain  Quantity:  12 tablet  Refills:  0     * oxyCODONE 5 MG tablet  Commonly known as:  ROXICODONE      Dose:  5 mg  Take 1 tablet (5 mg) by mouth every 6 hours as needed for severe pain  Quantity:  15 tablet  Refills:  0     senna-docusate 8.6-50 MG tablet  Commonly known as:  SENOKOT-S/PERICOLACE      Dose:  1 tablet  Take 1 tablet by mouth daily Start after delivery.  Quantity:   100 tablet  Refills:  0         * This list has 2 medication(s) that are the same as other medications prescribed for you. Read the directions carefully, and ask your doctor or other care provider to review them with you.            CONTINUE these medicines which have NOT CHANGED      Dose / Directions   cetirizine 10 MG tablet  Commonly known as:  zyrTEC  Used for:  Hives      Dose:  10 mg  Take 1 tablet (10 mg) by mouth daily  Quantity:  30 tablet  Refills:  1     prenatal multivitamin w/iron 27-0.8 MG tablet  Used for:  Encounter for supervision of normal pregnancy in second trimester, unspecified       Dose:  1 tablet  Take 1 tablet by mouth daily  Quantity:  90 tablet  Refills:  3     vitamin D3 50 mcg (2000 units) tablet  Commonly known as:  CHOLECALCIFEROL  Used for:  Vitamin D deficiency      Dose:  1 tablet  Take 1 tablet (2,000 Units) by mouth daily  Quantity:  90 tablet  Refills:  0           Where to get your medicines      These medications were sent to [id number] - [place], [place] - [address]  [address], [place] [address]    Phone:  [phone]     oxyCODONE 5 MG tablet     These medications were sent to [place], [place] - [address]  [address] [facility] [address] [address]    Phone:  [phone]     acetaminophen 325 MG tablet    ibuprofen 600 MG tablet    NIFEdipine ER 30 MG 24 hr tablet    senna-docusate 8.6-50 MG tablet     Some of these will need a paper prescription and others can be bought over the counter. Ask your nurse if you have questions.    Bring a paper prescription for each of these medications    oxyCODONE 5 MG tablet       ODIN Jimenes MD  Obstetrics and Gyncology, PGY-2  Ob-Gyn PGY-2 Pager: [phone]   20  9:39 AM
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
## 2020-06-26 NOTE — PROVIDER NOTIFICATION
06/26/20 0008   Provider Notification   Provider Name/Title Dr. Landeros    Method of Notification Electronic Page   Request Evaluate - Remote   Notification Reason Other (Comment)  (clarification of orders)   Do you want patient on Q4 hour VS, I&O and daily weights?

## 2020-06-26 NOTE — PROGRESS NOTES
Patient arrived per plan to room 422.  VSS, FHT's appropriate for gestational age.  Saline lock placed.  Urine Utox collected per order after verbal consent from patient.  Plan of care reviewed with patient, understanding verbalized.  Continue with current plan of care.

## 2020-06-26 NOTE — PROGRESS NOTES
Antepartum Progress Note    S: Patient reports frustration at hospitalization. Feels baby is very active and she will know if he is not doing well. Feels that she can maybe tolerate staying here for two more days.   Patient seen this evening due to emergencies in the labor unit this morning, and as she was sleeping on previous attempt to round in the afternoon.    O:   Patient Vitals for the past 24 hrs:   BP Temp Temp src Resp   20 0601 123/63 98.4  F (36.9  C) Oral 18   20 0017 105/81 98.2  F (36.8  C) Oral 18   20 (!) 140/79 98.9  F (37.2  C) -- 18     Gen: Appears, NAD  Pulm: non-labored breathing  : deferred  Ext: no edema bilaterally.     NST:  FHT: 140 baseline, moderate variability, no accels, no decels  TOCO: no contractions    Imagin/26 UA dopplers intermittently absent, BPP 8/8    A/P: Shanda Manrique is a 29 year old  female at 28w6d by 7w4d US, HD#3 re-admitted for severe fetal growth restriction with abnormal umbilical artery dopplers during pregnancy complicated by gestational hypertension, tobacco/marijuana use, obesity.    # Fetal growth restriction with lagging AC  # Abnormal umbilical artery dopplers  Growth ultrasound  notable for EFW  29%, AC 3%, new diagnosis of growth restriction. On , fetus previously normally grown EFW 51%.  - UA dopplers stable (intermittently absent), BPP 8/8 today   - s/p betamethasone ,   - GBS positive  - s/p NICU consult  - Continue daily doppler and BPP  - TID monitoring    Attempted to explain IUGR based on AC and abnormal dopplers further to patient. She does not want to discuss it at this time. Discussed continued daily doppler assessment is recommended to monitor safety of her baby. She will think about it.  NST non-reactive but category 1.      # History of drug use  # Tobacco use  - UDS + marijuana  - Social work has seen her  - Declines nicotine replacement     # Gestational hypertension:   - UPC 0.15 (),  HELLP labs wnl  - Blood pressure normal to mild range    # Vaginal discharge:   - Wet prep+yeast, s/p diflucan x1  6/24  - GC/CT negative     # PNC: [facility] CNM for prenatal care  - Rh negative (s/p rhogam 6/19), Rubella immune, HIV NR, RPR NR, HepB NR, GCT 90  - Placenta anterior  - Tdap ordered, but not given --patient declining at this time    Estela Andrea MD

## 2020-06-27 ENCOUNTER — ANESTHESIA EVENT (OUTPATIENT)
Dept: OBGYN | Facility: CLINIC | Age: 30
End: 2020-06-27

## 2020-06-27 ENCOUNTER — ANESTHESIA (OUTPATIENT)
Dept: OBGYN | Facility: CLINIC | Age: 30
End: 2020-06-27

## 2020-06-27 ENCOUNTER — HOSPITAL ENCOUNTER (INPATIENT)
Dept: ULTRASOUND IMAGING | Facility: CLINIC | Age: 30
End: 2020-06-27
Payer: COMMERCIAL

## 2020-06-27 LAB
ABO + RH BLD: ABNORMAL
ABO + RH BLD: ABNORMAL
ALT SERPL W P-5'-P-CCNC: 15 U/L (ref 0–50)
AST SERPL W P-5'-P-CCNC: 5 U/L (ref 0–45)
BLD GP AB INVEST PLASRBC-IMP: ABNORMAL
BLD GP AB SCN SERPL QL: ABNORMAL
BLOOD BANK CMNT PATIENT-IMP: ABNORMAL
BLOOD BANK CMNT PATIENT-IMP: ABNORMAL
CREAT SERPL-MCNC: 0.52 MG/DL (ref 0.52–1.04)
CREAT UR-MCNC: 169 MG/DL
ERYTHROCYTE [DISTWIDTH] IN BLOOD BY AUTOMATED COUNT: 14.6 % (ref 10–15)
GFR SERPL CREATININE-BSD FRML MDRD: >90 ML/MIN/{1.73_M2}
HCT VFR BLD AUTO: 35.3 % (ref 35–47)
HGB BLD-MCNC: 11.8 G/DL (ref 11.7–15.7)
MCH RBC QN AUTO: 31.3 PG (ref 26.5–33)
MCHC RBC AUTO-ENTMCNC: 33.4 G/DL (ref 31.5–36.5)
MCV RBC AUTO: 94 FL (ref 78–100)
PLATELET # BLD AUTO: 180 10E9/L (ref 150–450)
PROT UR-MCNC: 0.27 G/L
PROT/CREAT 24H UR: 0.16 G/G CR (ref 0–0.2)
RBC # BLD AUTO: 3.77 10E12/L (ref 3.8–5.2)
SPECIMEN EXP DATE BLD: ABNORMAL
WBC # BLD AUTO: 10.4 10E9/L (ref 4–11)

## 2020-06-27 PROCEDURE — 86901 BLOOD TYPING SEROLOGIC RH(D): CPT | Performed by: STUDENT IN AN ORGANIZED HEALTH CARE EDUCATION/TRAINING PROGRAM

## 2020-06-27 PROCEDURE — 76820 UMBILICAL ARTERY ECHO: CPT | Performed by: OBSTETRICS & GYNECOLOGY

## 2020-06-27 PROCEDURE — 12000001 ZZH R&B MED SURG/OB UMMC

## 2020-06-27 PROCEDURE — 82565 ASSAY OF CREATININE: CPT | Performed by: STUDENT IN AN ORGANIZED HEALTH CARE EDUCATION/TRAINING PROGRAM

## 2020-06-27 PROCEDURE — 84460 ALANINE AMINO (ALT) (SGPT): CPT | Performed by: STUDENT IN AN ORGANIZED HEALTH CARE EDUCATION/TRAINING PROGRAM

## 2020-06-27 PROCEDURE — 86870 RBC ANTIBODY IDENTIFICATION: CPT | Performed by: STUDENT IN AN ORGANIZED HEALTH CARE EDUCATION/TRAINING PROGRAM

## 2020-06-27 PROCEDURE — 86850 RBC ANTIBODY SCREEN: CPT | Performed by: STUDENT IN AN ORGANIZED HEALTH CARE EDUCATION/TRAINING PROGRAM

## 2020-06-27 PROCEDURE — 76819 FETAL BIOPHYS PROFIL W/O NST: CPT

## 2020-06-27 PROCEDURE — 3E0P7VZ INTRODUCTION OF HORMONE INTO FEMALE REPRODUCTIVE, VIA NATURAL OR ARTIFICIAL OPENING: ICD-10-PCS | Performed by: OBSTETRICS & GYNECOLOGY

## 2020-06-27 PROCEDURE — 36415 COLL VENOUS BLD VENIPUNCTURE: CPT | Performed by: STUDENT IN AN ORGANIZED HEALTH CARE EDUCATION/TRAINING PROGRAM

## 2020-06-27 PROCEDURE — 85027 COMPLETE CBC AUTOMATED: CPT | Performed by: STUDENT IN AN ORGANIZED HEALTH CARE EDUCATION/TRAINING PROGRAM

## 2020-06-27 PROCEDURE — 99232 SBSQ HOSP IP/OBS MODERATE 35: CPT | Mod: 25 | Performed by: OBSTETRICS & GYNECOLOGY

## 2020-06-27 PROCEDURE — 25000128 H RX IP 250 OP 636: Performed by: STUDENT IN AN ORGANIZED HEALTH CARE EDUCATION/TRAINING PROGRAM

## 2020-06-27 PROCEDURE — 25800030 ZZH RX IP 258 OP 636: Performed by: STUDENT IN AN ORGANIZED HEALTH CARE EDUCATION/TRAINING PROGRAM

## 2020-06-27 PROCEDURE — 25000132 ZZH RX MED GY IP 250 OP 250 PS 637: Performed by: STUDENT IN AN ORGANIZED HEALTH CARE EDUCATION/TRAINING PROGRAM

## 2020-06-27 PROCEDURE — 84156 ASSAY OF PROTEIN URINE: CPT | Performed by: STUDENT IN AN ORGANIZED HEALTH CARE EDUCATION/TRAINING PROGRAM

## 2020-06-27 PROCEDURE — 86900 BLOOD TYPING SEROLOGIC ABO: CPT | Performed by: STUDENT IN AN ORGANIZED HEALTH CARE EDUCATION/TRAINING PROGRAM

## 2020-06-27 PROCEDURE — 84450 TRANSFERASE (AST) (SGOT): CPT | Performed by: STUDENT IN AN ORGANIZED HEALTH CARE EDUCATION/TRAINING PROGRAM

## 2020-06-27 RX ORDER — MAGNESIUM SULFATE HEPTAHYDRATE 40 MG/ML
2 INJECTION, SOLUTION INTRAVENOUS
Status: DISCONTINUED | OUTPATIENT
Start: 2020-06-27 | End: 2020-07-02 | Stop reason: HOSPADM

## 2020-06-27 RX ORDER — MAGNESIUM SULFATE HEPTAHYDRATE 40 MG/ML
4 INJECTION, SOLUTION INTRAVENOUS ONCE
Status: COMPLETED | OUTPATIENT
Start: 2020-06-27 | End: 2020-06-27

## 2020-06-27 RX ORDER — SODIUM CHLORIDE, SODIUM LACTATE, POTASSIUM CHLORIDE, CALCIUM CHLORIDE 600; 310; 30; 20 MG/100ML; MG/100ML; MG/100ML; MG/100ML
INJECTION, SOLUTION INTRAVENOUS CONTINUOUS
Status: DISCONTINUED | OUTPATIENT
Start: 2020-06-27 | End: 2020-06-29

## 2020-06-27 RX ORDER — IBUPROFEN 800 MG/1
800 TABLET, FILM COATED ORAL
Status: DISCONTINUED | OUTPATIENT
Start: 2020-06-27 | End: 2020-06-29

## 2020-06-27 RX ORDER — NALOXONE HYDROCHLORIDE 0.4 MG/ML
.1-.4 INJECTION, SOLUTION INTRAMUSCULAR; INTRAVENOUS; SUBCUTANEOUS
Status: DISCONTINUED | OUTPATIENT
Start: 2020-06-27 | End: 2020-06-29

## 2020-06-27 RX ORDER — CARBOPROST TROMETHAMINE 250 UG/ML
250 INJECTION, SOLUTION INTRAMUSCULAR
Status: DISCONTINUED | OUTPATIENT
Start: 2020-06-27 | End: 2020-06-29

## 2020-06-27 RX ORDER — OXYTOCIN/0.9 % SODIUM CHLORIDE 30/500 ML
100-340 PLASTIC BAG, INJECTION (ML) INTRAVENOUS CONTINUOUS PRN
Status: COMPLETED | OUTPATIENT
Start: 2020-06-27 | End: 2020-06-29

## 2020-06-27 RX ORDER — SODIUM CHLORIDE, SODIUM LACTATE, POTASSIUM CHLORIDE, CALCIUM CHLORIDE 600; 310; 30; 20 MG/100ML; MG/100ML; MG/100ML; MG/100ML
10-125 INJECTION, SOLUTION INTRAVENOUS CONTINUOUS
Status: DISCONTINUED | OUTPATIENT
Start: 2020-06-27 | End: 2020-07-02 | Stop reason: HOSPADM

## 2020-06-27 RX ORDER — METHYLERGONOVINE MALEATE 0.2 MG/ML
200 INJECTION INTRAVENOUS
Status: DISCONTINUED | OUTPATIENT
Start: 2020-06-27 | End: 2020-06-29

## 2020-06-27 RX ORDER — DIPHENHYDRAMINE HCL 25 MG
25 CAPSULE ORAL EVERY 6 HOURS PRN
Status: DISCONTINUED | OUTPATIENT
Start: 2020-06-27 | End: 2020-06-29

## 2020-06-27 RX ORDER — LORAZEPAM 2 MG/ML
2 INJECTION INTRAMUSCULAR
Status: DISCONTINUED | OUTPATIENT
Start: 2020-06-27 | End: 2020-07-02 | Stop reason: HOSPADM

## 2020-06-27 RX ORDER — HYDRALAZINE HYDROCHLORIDE 20 MG/ML
10 INJECTION INTRAMUSCULAR; INTRAVENOUS
Status: DISCONTINUED | OUTPATIENT
Start: 2020-06-27 | End: 2020-07-01

## 2020-06-27 RX ORDER — DIPHENHYDRAMINE HYDROCHLORIDE 50 MG/ML
25 INJECTION INTRAMUSCULAR; INTRAVENOUS EVERY 6 HOURS PRN
Status: DISCONTINUED | OUTPATIENT
Start: 2020-06-27 | End: 2020-06-29

## 2020-06-27 RX ORDER — METOCLOPRAMIDE HYDROCHLORIDE 5 MG/ML
10 INJECTION INTRAMUSCULAR; INTRAVENOUS EVERY 6 HOURS
Status: DISCONTINUED | OUTPATIENT
Start: 2020-06-27 | End: 2020-06-29

## 2020-06-27 RX ORDER — OXYTOCIN 10 [USP'U]/ML
10 INJECTION, SOLUTION INTRAMUSCULAR; INTRAVENOUS
Status: DISCONTINUED | OUTPATIENT
Start: 2020-06-27 | End: 2020-06-29

## 2020-06-27 RX ORDER — PENICILLIN G POTASSIUM 5000000 [IU]/1
5 INJECTION, POWDER, FOR SOLUTION INTRAMUSCULAR; INTRAVENOUS ONCE
Status: DISCONTINUED | OUTPATIENT
Start: 2020-06-27 | End: 2020-06-29

## 2020-06-27 RX ORDER — ONDANSETRON 2 MG/ML
4 INJECTION INTRAMUSCULAR; INTRAVENOUS EVERY 6 HOURS PRN
Status: DISCONTINUED | OUTPATIENT
Start: 2020-06-27 | End: 2020-06-29

## 2020-06-27 RX ORDER — HYDRALAZINE HYDROCHLORIDE 20 MG/ML
5 INJECTION INTRAMUSCULAR; INTRAVENOUS
Status: DISCONTINUED | OUTPATIENT
Start: 2020-06-27 | End: 2020-07-02 | Stop reason: HOSPADM

## 2020-06-27 RX ORDER — MAGNESIUM SULFATE HEPTAHYDRATE 40 MG/ML
4 INJECTION, SOLUTION INTRAVENOUS
Status: DISCONTINUED | OUTPATIENT
Start: 2020-06-27 | End: 2020-07-02 | Stop reason: HOSPADM

## 2020-06-27 RX ORDER — ACETAMINOPHEN 325 MG/1
650 TABLET ORAL EVERY 4 HOURS PRN
Status: DISCONTINUED | OUTPATIENT
Start: 2020-06-27 | End: 2020-06-29

## 2020-06-27 RX ORDER — MAGNESIUM SULFATE HEPTAHYDRATE 500 MG/ML
4 INJECTION, SOLUTION INTRAMUSCULAR; INTRAVENOUS
Status: DISCONTINUED | OUTPATIENT
Start: 2020-06-27 | End: 2020-07-02 | Stop reason: HOSPADM

## 2020-06-27 RX ORDER — SODIUM CHLORIDE, SODIUM LACTATE, POTASSIUM CHLORIDE, CALCIUM CHLORIDE 600; 310; 30; 20 MG/100ML; MG/100ML; MG/100ML; MG/100ML
10-125 INJECTION, SOLUTION INTRAVENOUS CONTINUOUS
Status: DISCONTINUED | OUTPATIENT
Start: 2020-06-27 | End: 2020-06-29

## 2020-06-27 RX ORDER — MAGNESIUM SULFATE IN WATER 40 MG/ML
2 INJECTION, SOLUTION INTRAVENOUS CONTINUOUS
Status: DISPENSED | OUTPATIENT
Start: 2020-06-27 | End: 2020-06-30

## 2020-06-27 RX ORDER — OXYCODONE AND ACETAMINOPHEN 5; 325 MG/1; MG/1
1 TABLET ORAL
Status: DISCONTINUED | OUTPATIENT
Start: 2020-06-27 | End: 2020-06-29

## 2020-06-27 RX ORDER — CALCIUM GLUCONATE 94 MG/ML
1 INJECTION, SOLUTION INTRAVENOUS
Status: DISCONTINUED | OUTPATIENT
Start: 2020-06-27 | End: 2020-07-02 | Stop reason: HOSPADM

## 2020-06-27 RX ADMIN — ACETAMINOPHEN 975 MG: 325 TABLET, FILM COATED ORAL at 11:02

## 2020-06-27 RX ADMIN — PRENATAL VITAMINS-IRON FUMARATE 27 MG IRON-FOLIC ACID 0.8 MG TABLET 1 TABLET: at 11:02

## 2020-06-27 RX ADMIN — HYDRALAZINE HYDROCHLORIDE 5 MG: 20 INJECTION INTRAMUSCULAR; INTRAVENOUS at 13:00

## 2020-06-27 RX ADMIN — DIPHENHYDRAMINE HYDROCHLORIDE 25 MG: 25 CAPSULE ORAL at 23:59

## 2020-06-27 RX ADMIN — DINOPROSTONE 10 MG: 10 INSERT VAGINAL at 21:16

## 2020-06-27 RX ADMIN — SODIUM CHLORIDE, POTASSIUM CHLORIDE, SODIUM LACTATE AND CALCIUM CHLORIDE 25 ML/HR: 600; 310; 30; 20 INJECTION, SOLUTION INTRAVENOUS at 15:33

## 2020-06-27 RX ADMIN — MAGNESIUM SULFATE IN WATER 4 G: 40 INJECTION, SOLUTION INTRAVENOUS at 15:34

## 2020-06-27 RX ADMIN — DIPHENHYDRAMINE HYDROCHLORIDE 25 MG: 25 CAPSULE ORAL at 11:07

## 2020-06-27 RX ADMIN — ACETAMINOPHEN 975 MG: 325 TABLET, FILM COATED ORAL at 21:11

## 2020-06-27 RX ADMIN — ACETAMINOPHEN 975 MG: 325 TABLET, FILM COATED ORAL at 01:35

## 2020-06-27 RX ADMIN — MAGNESIUM SULFATE IN WATER 2 G/HR: 40 INJECTION, SOLUTION INTRAVENOUS at 16:14

## 2020-06-27 RX ADMIN — ACETAMINOPHEN 975 MG: 325 TABLET, FILM COATED ORAL at 17:17

## 2020-06-27 ASSESSMENT — MIFFLIN-ST. JEOR: SCORE: 2079.38

## 2020-06-27 NOTE — ANESTHESIA PREPROCEDURE EVALUATION
"Anesthesia Pre-Procedure Evaluation    Patient: Shanda Manrique   MRN:     [id number] Gender:   female   Age:    29 year old :      1990        Preoperative Diagnosis: * No surgery found *        LABS:  CBC:   Lab Results   Component Value Date    WBC 10.4 2020    WBC 8.5 2020    HGB 11.8 2020    HGB 12.4 2020    HCT 35.3 2020    HCT 36.9 2020     2020     2020     BMP:   Lab Results   Component Value Date    POTASSIUM 4.1 2020    CR 0.52 2020    CR 0.52 2020    GLC 93 2020     COAGS: No results found for: PTT, INR, FIBR  POC: No results found for: BGM, HCG, HCGS  OTHER:   Lab Results   Component Value Date    ALT 15 2020    AST 5 2020        Preop Vitals    BP Readings from Last 3 Encounters:   20 137/69   20 (!) 148/90   20 (!) 132/90    Pulse Readings from Last 3 Encounters:   20 74   20 88      Resp Readings from Last 3 Encounters:   20 18   20 18    SpO2 Readings from Last 3 Encounters:   20 100%      Temp Readings from Last 1 Encounters:   20 36.9  C (98.4  F) (Oral)    Ht Readings from Last 1 Encounters:   20 1.753 m (5' 9\")      Wt Readings from Last 1 Encounters:   20 128 kg (282 lb 3 oz)    Estimated body mass index is 41.67 kg/m  as calculated from the following:    Height as of this encounter: 1.753 m (5' 9\").    Weight as of this encounter: 128 kg (282 lb 3 oz).     LDA:  Peripheral IV 20 Right Lower forearm (Active)   Site Assessment WDL 20 1614   Line Status Infusing 20 1614   Phlebitis Scale 0-->no symptoms 20 1614   Infiltration Scale 0 20 1614   Dressing Intervention New dressing  20 0300   Number of days: 2        History reviewed. No pertinent past medical history.   History reviewed. No pertinent surgical history.   No Known Allergies     Anesthesia Evaluation       history and physical " reviewed .      No history of anesthetic complications (Eye surgery as a child, not aware of any complications)          ROS/MED HX    ENT/Pulmonary:      (-) asthma   Neurologic:  - neg neurologic ROS     Cardiovascular:  - neg cardiovascular ROS   (+) hypertension----. : . . . :. .       METS/Exercise Tolerance:     Hematologic:         Musculoskeletal:         GI/Hepatic:     (+) GERD       Renal/Genitourinary:         Endo:         Psychiatric:         Infectious Disease:         Malignancy:         Other:                     JZG FV AN PHYSICAL EXAM    JZG FV AN PLAN NO PONV RULE      (+) pre-eclampsia (IOL for severe preeclampsia. Platelets 180 on 20 at 1PM. )    On probation, with ankle tracking bracelet on.   Severe IUGR.     I saw the patient prior to IOL to discuss high risk of emergent  and our plan for this. I recommended early labor epidural to reduce her risk of needing emergent general anesthesia, given its associated risks of difficult intubation, cardiopulmonary complications, especially with severe preeclampsia. Patient voiced understanding of this. We also discussed that the epidural could be run at a low rate if she wants to feel contractions, and it would only be bolused if needed for .   Discussed risks of epidural including need for replacement, low blood pressure, bleeding, infection, nerve injury, headache and possible treatment with blood patch.  Discussed risks of general anesthesia, including sore throat/hoarse voice, abrasions/damage to lips/tongue/teeth, nausea, rare complications (including medication reactions, cardiac, pulmonary).       Louise Anton MD

## 2020-06-27 NOTE — PROVIDER NOTIFICATION
20   Provider Notification   Provider Name/Title Dr. Andrea   Method of Notification At Bedside   Notification Reason Status Update     Dr. Andrea at bedside to introduce herself to patient and discuss plan of care. BPP today looked good; 8/8 points. FHR stable on EFM. Patient expressed concern over prolonged hospital stay and that she probably would not stay beyond 2 days. Discussed the nature of the diagnosis of IUGR and abnormal dopplers and that this situation would be day to day depended on US and EFM. If fetal status worsened,  may be recommended as baby would be safer on the outside and cared for in the NICU. Dr. Andrea provided more info regarding fetal growth and percentiles. Patient stated she did not want to talk further.     Patient agreeable to evening EFM in one hour.

## 2020-06-27 NOTE — PROVIDER NOTIFICATION
Dr young at bedside consenting patient for a labor epidural.  Plan for epidural when patient starts to feel uncomfortable related to potential for emergent  for IUGR and GHTN.

## 2020-06-27 NOTE — PROVIDER NOTIFICATION
06/27/20 1500   Provider Notification   Provider Name/Title Dr. Mckeon and Dr. Goldberg   Method of Notification At Bedside   Request Evaluate in Person   Notification Reason Status Update   MD's at bedside discussing plan to start Magnesium Sulfate, after severe range BP's today.  Baby Cephalic by bedside U/S.  After discussing with patient, plan per MD to induce labor after Magnesium started. Patient desires a vaginal delivery if possible and verbalized agreement with plan.

## 2020-06-27 NOTE — PROVIDER NOTIFICATION
06/27/20 0712   Provider Notification   Provider Name/Title Dr. Cain    Method of Notification Phone   Request Evaluate - Remote   Notification Reason Variability Change     EFM this morning with minimal variability and possibly one broken accel, no decels. Per provider please monitor for one more hour.

## 2020-06-27 NOTE — PROVIDER NOTIFICATION
06/27/20 1235   Provider Notification   Provider Name/Title Dr. Mckeon   Method of Notification Electronic Page   Request Evaluate-Remote   Notification Reason Vital Signs Change;Status Update   MD notified of severe range BP's x2, 15 minutes apart, patient denies HA, visual disturbance, or RUQ pain.  Reflexes +1 with no clonus bilaterally.  Plan per MD to order IV Hydralazine and will come to bedside to evaluate patient.

## 2020-06-27 NOTE — PROVIDER NOTIFICATION
06/27/20 1428   Provider Notification   Provider Name/Title Dr. Mckeon   Method of Notification In Department   Request Evaluate - Remote   Notification Reason Status Update   Dr. Mckeon reviewing fetal and uterine monitor and BP's, plan per MD that patient may be off of EFM and [place] for now.

## 2020-06-27 NOTE — PROGRESS NOTES
I reviewed the chart due to new finding of severely elevated blood pressure which was sustained requiring IV antihypertensive treatment.  Given this, the patient meets criteria for preeclampsia with severe features.  Due to severe fetal growth restriction with previously reversed end diastolic flow, and now persistently absent end diastolic flow, particularly as the patient is now s/p betamethasone administration, the patient is not a candidate for expectant management of preeclampsia with severe features.  As long as there is no other obstetric contraindication to vaginal delivery, recommend initiation of magnesium sulfate and induction of labor.  There is certainly a higher risk of fetal intolerance of labor with the induction, and as such a higher risk of intrapartum, potentially emergent .    Louise Lares MD  Maternal Fetal Medicine

## 2020-06-27 NOTE — PLAN OF CARE
VSS. Afebrile. Denies cramping, bleeding, LOF. EFM as charted. PIV saline locked, new dressing applied. Patient had some R ear pain, tylenol and warm compress given which helped. She would like providers to look at ear during rounds. States she was seen for ear pain by a primary care provider during pregnancy who prescribed ear drops but she never picked them up. Patient ordered pizza late in the night and otherwise slept. Continue to monitor.

## 2020-06-27 NOTE — PROGRESS NOTES
Antepartum Progress Note    S: Patient is doing okay this morning. She slept well. No contractions or bleeding. Normal fetal movement.  Denies any headache, vision changes, SOB or chest pain.     O:   Patient Vitals for the past 24 hrs:   BP Temp Temp src Heart Rate Resp   20 1427 120/67 -- -- -- --   20 1415 128/63 -- -- -- --   20 1405 126/60 -- -- -- --   20 1355 130/63 -- -- -- --   20 1346 129/62 -- -- -- --   20 1335 127/78 -- -- -- 18   20 1323 139/71 -- -- -- 18   20 1318 (!) 145/76 -- -- -- 18   20 1313 (!) 156/76 -- -- -- 18   20 1310 (!) 160/78 -- -- -- 18   20 1304 (!) 153/92 -- -- -- 18   20 1300 (!) 146/88 -- -- -- 18   20 1256 (!) 174/90 -- -- -- 18   20 1230 (!) 168/85 -- -- -- 18   20 1212 (!) 160/82 98.5  F (36.9  C) Oral -- 18   20 0743 -- 98.2  F (36.8  C) Oral -- 16   20 0622 123/77 -- -- -- --   20 0602 (!) 141/71 98.4  F (36.9  C) Oral -- --   20 2200 128/68 98.2  F (36.8  C) Oral -- 16   20 1515 117/59 98.3  F (36.8  C) Oral 64 20     Gen: Appears, NAD  Pulm: non-labored breathing  : deferred  Ext: no edema bilaterally.     NST:  FHT: 140 bpm baseline, moderate variability, no accels, no decels  TOCO: no contractions    Imagin/27 UA dopplers with absent end diastolic flow, BPP 8/    A/P: Shanda Manrique is a 29 year old  female at 29w1d by 7w4d US, HD#4 re-admitted for severe fetal growth restriction with abnormal umbilical artery dopplers during pregnancy complicated by gestational hypertension, tobacco/marijuana use, obesity.    # Gestational hypertension:   - UPC 0.15 (), HELLP labs wnl  - Blood pressure largely normal to mild range, however, had sustained severe range today (BP taken while upset). She received IV hydralazine x1 with significant improvement in BP (FHT category I, not reactive at the time) to normal range.   - Repeat HELLP labs ordered and  UPC. Will assess labs and then consult MFM about whether to treat as severe feature and start IV magnesium    # Fetal growth restriction with lagging AC  # Abnormal umbilical artery dopplers  Growth ultrasound 6/24 notable for EFW  29%, AC 3%, new diagnosis of growth restriction. On 5/20, fetus previously normally grown EFW 51%.  - UA dopplers stable (absent), BPP 8/8 today 6/27  - s/p betamethasone 6/24, 6/25  - GBS positive  - s/p NICU consult  - Continue daily doppler and BPP  - TID monitoring     # History of drug use  # Tobacco use  - UDS + marijuana  - Social work has seen her  - Declines nicotine replacement     # Vaginal discharge:   - Wet prep+yeast, s/p diflucan x1  6/24  - GC/CT negative     # PNC: [facility] CNM for prenatal care  - Rh negative (s/p rhogam 6/19), Rubella immune, HIV NR, RPR NR, HepB NR, GCT 90  - Placenta anterior  - Tdap ordered, but not given --patient declining at this time    Patient seen with Dr. Yusuf Mckeon MD, MPH  OBGYN PGY-3  6/27/2020 10:45 AM

## 2020-06-27 NOTE — PLAN OF CARE
Patient accepted to room 479 for IOL.  Report received from IDRIS Rose RN.  Patient on Magnesium 2grams.  FHM and TOCO applied with verbal consent.

## 2020-06-27 NOTE — PROGRESS NOTES
Patient with episode of sustained severe range BPs, came down with 5mg hydralazine and now normal to mildly elevated.  HELLP labs wnl, awaiting patient to leave urine sample for UPC.  Ob resident discussed with CHUNG Aragon and given fetal findings in combo with BPs, recommendation is to begin magnesium sulfate for presumed preeclampsia with severe features and move toward delivery.    Discussed with Shanda that we are recommending MgSo4 and delivery and she is upset but agreeable.  She strongly desires vaginal delivery, so we will begin with IOL.  We discussed high chance of fetal intolerance to labor and need for , possibly emergent, and she verbalizes understanding.  We also discussed c/s would potentially need to be classical incision, meaning all future deliveries would need to be  and she verbalizes understanding.  This has previously been discussed with her and c/s consent previously signed.     Will begin magnesium sulfate, BSUS to confirm cephalic presentation and then move to L&D if appropriate for induction.  She is understandably upset, is calling support person to come to hospital now.    JAMAR SAUNDERS MD

## 2020-06-27 NOTE — PROVIDER NOTIFICATION
06/27/20 1102   Provider Notification   Provider Name/Title Dr. Goldberg   Method of Notification In Department   Notification Reason Status Update   Dr. Goldberg rounding on patient, plan to continue using Tylenol and heat packs for right ear pain, and allergy medication as needed.  Patient verbalizing agreement with plan.

## 2020-06-27 NOTE — PROVIDER NOTIFICATION
06/27/20 1256   Provider Notification   Provider Name/Title Dr. Mckeon   Method of Notification At Bedside   Request Evaluate in Person   Notification Reason Vital Signs Change;Status Update   MD at bedside, discussing plan with patient to draw HELLP labs, to monitor baby, start strict I/O's, and check frequent BPs every 10 minutes for now.  Patient verbalizing agreement with plan.

## 2020-06-28 LAB
BACTERIA SPEC CULT: ABNORMAL
MAGNESIUM SERPL-MCNC: 3.5 MG/DL (ref 1.6–2.3)
SPECIMEN SOURCE: ABNORMAL

## 2020-06-28 PROCEDURE — 83735 ASSAY OF MAGNESIUM: CPT | Performed by: STUDENT IN AN ORGANIZED HEALTH CARE EDUCATION/TRAINING PROGRAM

## 2020-06-28 PROCEDURE — 36415 COLL VENOUS BLD VENIPUNCTURE: CPT | Performed by: STUDENT IN AN ORGANIZED HEALTH CARE EDUCATION/TRAINING PROGRAM

## 2020-06-28 PROCEDURE — 25000132 ZZH RX MED GY IP 250 OP 250 PS 637: Performed by: STUDENT IN AN ORGANIZED HEALTH CARE EDUCATION/TRAINING PROGRAM

## 2020-06-28 PROCEDURE — 12000001 ZZH R&B MED SURG/OB UMMC

## 2020-06-28 PROCEDURE — 25000128 H RX IP 250 OP 636: Performed by: STUDENT IN AN ORGANIZED HEALTH CARE EDUCATION/TRAINING PROGRAM

## 2020-06-28 PROCEDURE — 25800030 ZZH RX IP 258 OP 636: Performed by: STUDENT IN AN ORGANIZED HEALTH CARE EDUCATION/TRAINING PROGRAM

## 2020-06-28 PROCEDURE — 59200 INSERT CERVICAL DILATOR: CPT | Performed by: OBSTETRICS & GYNECOLOGY

## 2020-06-28 PROCEDURE — 25000132 ZZH RX MED GY IP 250 OP 250 PS 637: Performed by: OBSTETRICS & GYNECOLOGY

## 2020-06-28 RX ORDER — TERBUTALINE SULFATE 1 MG/ML
0.25 INJECTION, SOLUTION SUBCUTANEOUS
Status: DISCONTINUED | OUTPATIENT
Start: 2020-06-28 | End: 2020-06-29

## 2020-06-28 RX ORDER — MISOPROSTOL 100 UG/1
25 TABLET ORAL EVERY 4 HOURS PRN
Status: COMPLETED | OUTPATIENT
Start: 2020-06-28 | End: 2020-06-29

## 2020-06-28 RX ADMIN — MAGNESIUM SULFATE IN WATER 2 G/HR: 40 INJECTION, SOLUTION INTRAVENOUS at 22:03

## 2020-06-28 RX ADMIN — ACETAMINOPHEN 975 MG: 325 TABLET, FILM COATED ORAL at 07:30

## 2020-06-28 RX ADMIN — SODIUM CHLORIDE, POTASSIUM CHLORIDE, SODIUM LACTATE AND CALCIUM CHLORIDE 75 ML/HR: 600; 310; 30; 20 INJECTION, SOLUTION INTRAVENOUS at 02:14

## 2020-06-28 RX ADMIN — ACETAMINOPHEN 975 MG: 325 TABLET, FILM COATED ORAL at 11:44

## 2020-06-28 RX ADMIN — ACETAMINOPHEN 975 MG: 325 TABLET, FILM COATED ORAL at 21:15

## 2020-06-28 RX ADMIN — Medication 25 MCG: at 21:13

## 2020-06-28 RX ADMIN — MAGNESIUM SULFATE IN WATER 2 G/HR: 40 INJECTION, SOLUTION INTRAVENOUS at 11:54

## 2020-06-28 RX ADMIN — Medication 25 MCG: at 17:00

## 2020-06-28 RX ADMIN — MAGNESIUM SULFATE IN WATER 2 G/HR: 40 INJECTION, SOLUTION INTRAVENOUS at 02:14

## 2020-06-28 RX ADMIN — METOCLOPRAMIDE HYDROCHLORIDE 10 MG: 5 INJECTION INTRAMUSCULAR; INTRAVENOUS at 00:00

## 2020-06-28 RX ADMIN — ACETAMINOPHEN 975 MG: 325 TABLET, FILM COATED ORAL at 16:35

## 2020-06-28 RX ADMIN — Medication 25 MCG: at 11:43

## 2020-06-28 RX ADMIN — SODIUM CHLORIDE, POTASSIUM CHLORIDE, SODIUM LACTATE AND CALCIUM CHLORIDE 75 ML/HR: 600; 310; 30; 20 INJECTION, SOLUTION INTRAVENOUS at 15:17

## 2020-06-28 ASSESSMENT — MIFFLIN-ST. JEOR: SCORE: 2106.65

## 2020-06-28 NOTE — PLAN OF CARE
This RN discussed with patient the risks  Of getting Burns with having metal house arrest bracelet on in the event of needing surgery. Discussed with pt. If she would be ok with staff cutting her ankle bracelet off in the even of an emergency  section. Pt. Gives verbal consent to cut ankle bracelet in an emergency until we are able to get a hold of [facility] for further instruction.    Pt. Provided RN with after hours number for house arrest bracelet for [facility]. Number called and per [facility] staff can cut bracelet off in the event surgery is needed. Staff to call after hours number and provide documentation to pt. That bracelet was cut by staff for surgery.

## 2020-06-28 NOTE — PLAN OF CARE
VSS.  Pt. Pleasant and cooperative this shift. Cervidil placed at 2115. Pt. Complaint of HA earlier in shift that was resolved with benadryl and reglan. HA now back again this morning, pt. Declines wanting anything for it. Mag stopped briefly earlier in shift due to pt. Feeling SOB/chest tightness. Mag level was 3.5. Mag restarted at 2gr/hr which its currently running at. Symptoms resolved. Denies any other s/s of pre-eclampsia. Adequate U/O. Baby difficult to monitor at times due to pt. Moving around. Pt. Finally able to sleep this morning. Plan to remove cervidil this morning and check cervix and make a new plan for the day. Continue to monitor closely.

## 2020-06-28 NOTE — PROVIDER NOTIFICATION
06/27/20 2342   Provider Notification   Provider Name/Title Dr. Mckeon   Method of Notification Electronic Page   Request Evaluate - Remote   Notification Reason Pain   pt. Complaining of HA still, unresolved with Tylenol. Plan to try benadryl and reglan. Pt. Agreeable.

## 2020-06-28 NOTE — PLAN OF CARE
Patient is continuing to have cervical ripening. She is not feeling cramping or UC.  She does feel baby move. She continues to have a headache but no other symptoms of Pre-e.  She is currently napping

## 2020-06-28 NOTE — PROGRESS NOTES
Labor Progress and Magnesium Check Note    S:  Patient feels like mild tension headache is back, but no vision changes. SOB/tightness resolved and hasn't returned. No SOB, chest pain, or RUQ pain.    O:   Patient Vitals for the past 4 hrs:   BP Temp Temp src Resp   20 0400 (!) 141/85 98.5  F (36.9  C) Oral 20     Gen: resting comfortably  CV: Regular rate  Pulm: Breathing comfortably, CTAB and no crakles  Abd: Soft, non-tender, gravid  Ext: No edema, 1+ reflexes b/l UE, no clonus    FHT: Baseline 135, moderate to minimal variability, + accelerations, no decelerations (baseline change at 0530)  [place]: No contractions on tocometer, difficult to monitor due to habitus    I/O:   UOP 1100 ml since MN (~200ml/hr)    Intake/Output Summary (Last 24 hours) at 2020 0240  Last data filed at 2020 0214  Gross per 24 hour   Intake 1667.08 ml   Output 1350 ml   Net 317.08 ml       Magnesium   Date Value Ref Range Status   2020 3.5 (H) 1.6 - 2.3 mg/dL Final     A/P:  Shanda Manrique is a 29 year old  female at 29w2d by 7w4d US, admitted for severe fetal growth restriction with abnormal umbilical artery dopplers during pregnancy, now meeting criteria for pre-eclampsia with severe features and undergoing IOL.     # Pre-eclampsia with severe features:   - UPC 0.16 (), HELLP labs wnl. Repeat as clinically indicated  - s/p IV hydralazine x1 at 1300, . Currently normal to low mild range. Continue frequent BP monitoring. Notify MD with 160/110 or greater. PRN labetalol or hydralazine for sustained severe range blood pressures.   - Magnesium: 4g>2g/hr>lvl 3.5. Previously tightness in chest so Mg stopped briefly. Resumed at same rate.   - Symptoms: Denies currently, had mild headache earlier that now improved s/p reglan and benadryl.   - UOP: Adequate, continue strict I/O's   - Given Pre-E w/ SF and IUGR with abnormal dopplers, inducing labor. Patient consented for CS, possible classical, if fetal intolerance  of labor.    # IOL:   - Cervix C/L/H. Cervidil placed at 2115 for ripening so that it can be removed if fetal intolerance of labor and will attempt flor after 12h cervidil versus miso versus pit pending FHT.  - s/p anesthesia consult, will plan for early epidural given high risk of needing CS due to fetal intolerance  - s/p CS consent  - Penicillin for GBS positive in labor     # Fetal growth restriction with lagging AC  # Abnormal umbilical artery dopplers  Growth ultrasound 6/24 notable for EFW  29%, AC 3%, new diagnosis of growth restriction. On 5/20, fetus previously normally grown EFW 51%.  - UA dopplers stable (absent), BPP 8/8 6/27  - s/p betamethasone 6/24, 6/25  - GBS positive, treat with PCN in labor  - s/p NICU consult, NICU aware of IOL  - FHT currently category I and appropriate for gestational age during labor. Continuous monitoring with intrauterine resuscitation PRN     # History of drug use  # Tobacco use  # House arrest, ankle bracelet  - UDS + marijuana  - Social work has seen her  - Declines nicotine replacement  - If CS indicated, will cut off ankle bracelet to avoid electrocautery burns, okay per [facility] home monitoring, but need to provide documentation to patient.      Christiano Mckeon MD  Obstetrics and Gyncology, PGY-3  6/28/2020 6:01 AM

## 2020-06-28 NOTE — PROVIDER NOTIFICATION
06/28/20 0023   Provider Notification   Provider Name/Title Dr. Mckeon   Method of Notification Electronic Page   Request Evaluate - Remote   Notification Reason Other (Comment)   Pt. Complaining of difficulty breathing and chest tightness. Magnesium stopped and mag level released. Lab in to draw. Will await results. Will continue to monitor closely.

## 2020-06-28 NOTE — PROGRESS NOTES
[facility] PROGRESS NOTE:   2020   11:26 AM         SUBJECTIVE:   Patient c/o headache.    Contractions:  q 0 minutes  Leakage of fluid:  No  Vaginal bleeding:  No  Pain controlled:  Yes           OBJECTIVE:     Vitals:    20 0005 20 0400 20 0750   BP: 127/67 117/66 (!) 141/85    Resp:  20    Temp: 98.5  F (36.9  C) 97.9  F (36.6  C) 98.5  F (36.9  C)    TempSrc: Oral Oral Oral    SpO2:       Weight:    130.7 kg (288 lb 3.2 oz)   Height:             NST:  Fetal Heart Rate Tracing:   Baseline: 135  Variability: Moderate  Accels, no decels    Tocometer: No contractions    Abdomen:  Gravid, NT  Cervix:   Dilation: closed   Effacement: 50%   Station:-3   Consistency: firm   Position: Posterior           LABS:     Recent Results (from the past 12 hour(s))   Magnesium    Collection Time: 20 12:36 AM   Result Value Ref Range    Magnesium 3.5 (H) 1.6 - 2.3 mg/dL              ASSESSMENT:   29 year old  at 29w2d   induction of labor, indication intrauterine growth retardation and pre-eclampsia with severe features by severe range blood pressures and now headache. Abnormal dopplers on ultrasound: absent end-diastolic flow           PLAN:   Cervidil removed, was right at vaginal opening.   Discussed pros/cons of misoprostol versus cervidil. Discussed that misoprostol cannot be removed but may be more effective. Discussed oral versus vaginal. She chooses to proceed with vaginal misoprostol. Aware of risk of  if category 2 or 3 tracing arises.     Estela Andrea MD     1145: Misoprostol placed vaginally by me.     Estela Andrea MD

## 2020-06-28 NOTE — PROGRESS NOTES
Labor Progress and Magnesium Check Note    S:  Patient is doing okay. Resting comfortably after cervidil placed. Not feeling many contractions. Had a mild headache earlier, but it has resolved. No SOB, chest pain, or RUQ pain.    O:   Patient Vitals for the past 4 hrs:   BP Temp Temp src Resp   20 127/67 98.5  F (36.9  C) Oral 18     Gen: resting comfortably  CV: Regular rate  Pulm: Breathing comfortably, CTAB and no crakles  Abd: Soft, non-tender, gravid  Ext: No edema, 2+ reflexes b/l LE, no clonus    SVE: C/L/H, cervidil placed by nursing at 2116    FHT: Baseline 140, moderate to minimal variability, no accelerations, no decelerations  [place]: No contractions    I/O:   Date 20 0700 - 20 0659   Shift 5246-8343 1322-4493 7806-1960 24 Hour Total   INTAKE   P.O. 200 100  300   I.V.  205.83  205.83   Shift Total(mL/kg) 200(1.56) 305.83(2.39)  505.83(3.95)   OUTPUT   Urine 200 450  650   Shift Total(mL/kg) 200(1.56) 450(3.52)  650(5.08)   Weight (kg) 128 128 128 128       A/P:  Shanda aMnrique is a 29 year old  female at 29w1d by 7w4d US, admitted for severe fetal growth restriction with abnormal umbilical artery dopplers during pregnancy, now meeting criteria for pre-eclampsia with severe features and undergoing IOL.     # Pre-eclampsia with severe features:   - UPC 0.16 (), HELLP labs wnl. Repeat as clinically indicated  - s/p IV hydralazine x1 at 1300, with significant improvement in BP. Currently normal to low mild range. Continue frequent BP monitoring. Notify MD with 160/110 or greater. PRN labetalol or hydralazine for sustained severe range blood pressures.   - Magnesium: 4g>2g/hr. No s/s of magnesium toxicity at this time, continue  - Symptoms: Denies currently  - UOP: Adequate, continue strict I/O's   - Given Pre-E w/ SF and IUGR with abnormal dopplers, inducing labor. Patient consented for CS, possible classical, if fetal intolerance of labor.    # IOL:   - Cervix C/L/H.  Cervidil placed at 2115 for ripening so that it can be removed if fetal intolerance of labor and will attempt flor after 12h cervidil.  - s/p anesthesia consult, will plan for early epidural given high risk of needing CS due to fetal intolerance  - s/p CS consent  - Penicillin for GBS positive in labor     # Fetal growth restriction with lagging AC  # Abnormal umbilical artery dopplers  Growth ultrasound 6/24 notable for EFW  29%, AC 3%, new diagnosis of growth restriction. On 5/20, fetus previously normally grown EFW 51%.  - UA dopplers stable (absent), BPP 8/8 6/27  - s/p betamethasone 6/24, 6/25  - GBS positive, treat with PCN in labor  - s/p NICU consult, NICU aware of IOL  - FHT currently category I and appropriate for gestational age during labor. Continuous monitoring with intrauterine resuscitation PRN     # History of drug use  # Tobacco use  # House arrest, ankle bracelet  - UDS + marijuana  - Social work has seen her  - Declines nicotine replacement  - If CS indicated, will cut off ankle bracelet to avoid electrocautery burns, okay per [facility] home monitoring, but need to provide documentation to patient.      Christiano Mckeon MD  Obstetrics and Gyncology, PGY-3  6/27/2020 10:08 PM

## 2020-06-28 NOTE — PROVIDER NOTIFICATION
Patient continues to change into positions that do not support fetal monitoring.  Encouraged to sit back for monitoring.

## 2020-06-28 NOTE — PROGRESS NOTES
Labor Progress and Magnesium Check Note    S:  Headache is better than it was earlier today. No SOB, chest pain, or RUQ pain. Some cramping.     O:   Patient Vitals for the past 24 hrs:   BP Temp Temp src Heart Rate Resp Weight   20 1715 (!) 153/87 98.4  F (36.9  C) Oral 82 20 --   20 1150 139/83 98.7  F (37.1  C) Oral -- -- --   20 0750 -- -- -- -- -- 130.7 kg (288 lb 3.2 oz)   20 0400 (!) 141/85 98.5  F (36.9  C) Oral -- 20 --   20 0005 117/66 97.9  F (36.6  C) Oral -- 20 --   20 127/67 98.5  F (36.9  C) Oral -- 18 --   Gen: resting comfortably  CV: Regular rate  Pulm: Breathing comfortably, CTAB and no crakles  Abd: Soft, non-tender, gravid  Ext: No edema, 1+ reflexes b/l UE, no clonus    FHT: Prolonged periods of no FHT, now on monitoring with FHT baseline 145, minmal to mod sherrill, no accels, no decels  [place]: No contractions on tocometer, difficult to monitor due to habitus, pt reports intermittent cramping    I/O:   550 ml over past 4 hours      Magnesium   Date Value Ref Range Status   2020 3.5 (H) 1.6 - 2.3 mg/dL Final     A/P:  Shanda Manrique is a 29 year old  female at 29w2d by 7w4d US, admitted for severe fetal growth restriction with abnormal umbilical artery dopplers during pregnancy, now meeting criteria for pre-eclampsia with severe features and undergoing IOL.     # Pre-eclampsia with severe features:   - UPC 0.16 (), HELLP labs wnl. Repeat as clinically indicated  - s/p IV hydralazine x1 at 1300, . Currently normal to mild range. Continue frequent BP monitoring. Notify MD with 160/110 or greater. PRN labetalol or hydralazine for sustained severe range blood pressures.   - Magnesium: 4g>2g/hr>lvl 3.5. No evidence of toxicity.   - Symptoms: Denies currently  - UOP: Adequate, continue strict I/O's   - Given Pre-E w/ SF and IUGR with abnormal dopplers, inducing labor. Patient consented for CS, possible classical, if fetal intolerance of labor.    #  IOL:   - Cervix C/L/H. S/p cervidil and PV miso x2, still closed. Will assess for [place] PRN.   - s/p anesthesia consult, will plan for early epidural given high risk of needing CS due to fetal intolerance  - s/p CS consent  - Penicillin for GBS positive in labor     # Fetal growth restriction with lagging AC  # Abnormal umbilical artery dopplers  Growth ultrasound 6/24 notable for EFW  29%, AC 3%, new diagnosis of growth restriction. On 5/20, fetus previously normally grown EFW 51%.  - UA dopplers stable (absent), BPP 8/8 6/27  - s/p betamethasone 6/24, 6/25  - GBS positive, treat with PCN in labor  - s/p NICU consult, NICU aware of IOL  - FHT currently category I and appropriate for gestational age during labor (prolonged period of difficult monitoring this afternoon, now continuous and appropriate). Continuous monitoring with intrauterine resuscitation PRN     # History of drug use  # Tobacco use  # House arrest, ankle bracelet  - UDS + marijuana  - Social work has seen her  - Declines nicotine replacement  - If CS indicated, will cut off ankle bracelet to avoid electrocautery burns, okay per [facility] home monitoring, but need to provide documentation to patient.      Monalisa Cain MD  OB/GYN PGY-3  06/28/20 6:38 PM

## 2020-06-28 NOTE — PROGRESS NOTES
Labor Progress and Magnesium Check Note    S:  Patient is doing better, resting, comfortable. Had a mild headache earlier, but it has resolved after reglan and benadryl. SOB/tightness resolved. No SOB, chest pain, or RUQ pain.    O:   Patient Vitals for the past 4 hrs:   BP Temp Temp src Resp   20 0005 117/66 97.9  F (36.6  C) Oral 20     Gen: resting comfortably  CV: Regular rate  Pulm: Breathing comfortably, CTAB and no crakles  Abd: Soft, non-tender, gravid  Ext: No edema, 1+ reflexes b/l LE, no clonus    FHT: Baseline 135, moderate to minimal variability, no accelerations, no decelerations  [place]: No contractions on tocometer    I/O:    ml since MN (250ml/hr)    Intake/Output Summary (Last 24 hours) at 2020 0240  Last data filed at 2020 0214  Gross per 24 hour   Intake 1667.08 ml   Output 1350 ml   Net 317.08 ml       Magnesium   Date Value Ref Range Status   2020 3.5 (H) 1.6 - 2.3 mg/dL Final     A/P:  Shanda Manrique is a 29 year old  female at 29w2d by 7w4d US, admitted for severe fetal growth restriction with abnormal umbilical artery dopplers during pregnancy, now meeting criteria for pre-eclampsia with severe features and undergoing IOL.     # Pre-eclampsia with severe features:   - UPC 0.16 (), HELLP labs wnl. Repeat as clinically indicated  - s/p IV hydralazine x1 at 1300, . Currently normal to low mild range. Continue frequent BP monitoring. Notify MD with 160/110 or greater. PRN labetalol or hydralazine for sustained severe range blood pressures.   - Magnesium: 4g>2g/hr>lvl 3.5. Previously tightness in chest so Mg stopped briefly. Resumed at same rate.   - Symptoms: Denies currently, had mild headache earlier that now improved s/p reglan and benadryl.   - UOP: Adequate, continue strict I/O's   - Given Pre-E w/ SF and IUGR with abnormal dopplers, inducing labor. Patient consented for CS, possible classical, if fetal intolerance of labor.    # IOL:   - Cervix C/L/H.  Cervidil placed at 2115 for ripening so that it can be removed if fetal intolerance of labor and will attempt flor after 12h cervidil.  - s/p anesthesia consult, will plan for early epidural given high risk of needing CS due to fetal intolerance  - s/p CS consent  - Penicillin for GBS positive in labor     # Fetal growth restriction with lagging AC  # Abnormal umbilical artery dopplers  Growth ultrasound 6/24 notable for EFW  29%, AC 3%, new diagnosis of growth restriction. On 5/20, fetus previously normally grown EFW 51%.  - UA dopplers stable (absent), BPP 8/8 6/27  - s/p betamethasone 6/24, 6/25  - GBS positive, treat with PCN in labor  - s/p NICU consult, NICU aware of IOL  - FHT currently category I and appropriate for gestational age during labor. Continuous monitoring with intrauterine resuscitation PRN     # History of drug use  # Tobacco use  # House arrest, ankle bracelet  - UDS + marijuana  - Social work has seen her  - Declines nicotine replacement  - If CS indicated, will cut off ankle bracelet to avoid electrocautery burns, okay per [facility] home monitoring, but need to provide documentation to patient.      Christiano Mckeon MD  Obstetrics and Gyncology, PGY-3  6/28/2020 2:36 AM

## 2020-06-28 NOTE — PROGRESS NOTES
Patient moved to L&D and MgSo4 has been started.  Patient initially asking to delay start of induction until she showers.  Her brother, Ramesh came earlier as her designated support person, but then left shortly after.  Now her other brother, shaina, arrived with his girlfriend asking to come up.  She became very upset when she was told that Ramesh was her designated support person and only he would be allowed back on the unit as per [facility] policy.  Both she and Shaina were upset, and while Dr. Mckeon and I were in the room with her discussing the situation, he called letting her know he was in the lobby waiting to be let up.  She let him know that he couldn't come up and asked us to leave her room stating she would be going to take a shower.  Will place cervidil when she is done.  Bps stable.  Prior to this incident, she was on the FHR monitor and baby had a cat 1/appropriate tracing, but would not get into position for us to adequately monitor the baby.    JAMAR SAUNDERS MD

## 2020-06-28 NOTE — PROGRESS NOTES
Attempting to find FHr.  Pt took the toco and US belts off stating they were too tight.  Replaced after restroom use and her weight.  Baby is difficult to monitor due to maternal habitis

## 2020-06-28 NOTE — PROVIDER NOTIFICATION
06/28/20 0105   Provider Notification   Provider Name/Title Dr. Mckeon   Method of Notification Phone   Request Evaluate - Remote   Notification Reason Other (Comment)   Mag level 3.5. pt. Feeling better. Per Dr. Mckeon, will restart mag at 2gr/hr. Pt. Educated when to call for RN. Verbalizes understanding. Continue to monitor closely.

## 2020-06-29 ENCOUNTER — ANCILLARY PROCEDURE (OUTPATIENT)
Dept: ULTRASOUND IMAGING | Facility: CLINIC | Age: 30
End: 2020-06-29
Attending: ANESTHESIOLOGY
Payer: COMMERCIAL

## 2020-06-29 ENCOUNTER — ANESTHESIA (OUTPATIENT)
Dept: OBGYN | Facility: CLINIC | Age: 30
End: 2020-06-29
Payer: COMMERCIAL

## 2020-06-29 ENCOUNTER — ANESTHESIA EVENT (OUTPATIENT)
Dept: OBGYN | Facility: CLINIC | Age: 30
End: 2020-06-29
Payer: COMMERCIAL

## 2020-06-29 PROBLEM — Z98.891 S/P CESAREAN SECTION: Status: ACTIVE | Noted: 2020-06-29

## 2020-06-29 LAB
ALT SERPL W P-5'-P-CCNC: 10 U/L (ref 0–50)
AST SERPL W P-5'-P-CCNC: 6 U/L (ref 0–45)
BLOOD BANK CMNT PATIENT-IMP: NORMAL
CREAT SERPL-MCNC: 0.52 MG/DL (ref 0.52–1.04)
GFR SERPL CREATININE-BSD FRML MDRD: >90 ML/MIN/{1.73_M2}
HGB BLD-MCNC: 11.8 G/DL (ref 11.7–15.7)
MAGNESIUM SERPL-MCNC: 4.4 MG/DL (ref 1.6–2.3)
PLATELET # BLD AUTO: 175 10E9/L (ref 150–450)

## 2020-06-29 PROCEDURE — 85018 HEMOGLOBIN: CPT | Performed by: OBSTETRICS & GYNECOLOGY

## 2020-06-29 PROCEDURE — 84460 ALANINE AMINO (ALT) (SGPT): CPT | Performed by: OBSTETRICS & GYNECOLOGY

## 2020-06-29 PROCEDURE — 71000015 ZZH RECOVERY PHASE 1 LEVEL 2 EA ADDTL HR: Performed by: OBSTETRICS & GYNECOLOGY

## 2020-06-29 PROCEDURE — 25000128 H RX IP 250 OP 636: Performed by: STUDENT IN AN ORGANIZED HEALTH CARE EDUCATION/TRAINING PROGRAM

## 2020-06-29 PROCEDURE — 25000128 H RX IP 250 OP 636: Performed by: OBSTETRICS & GYNECOLOGY

## 2020-06-29 PROCEDURE — 59515 CESAREAN DELIVERY: CPT | Mod: GC | Performed by: OBSTETRICS & GYNECOLOGY

## 2020-06-29 PROCEDURE — 25000132 ZZH RX MED GY IP 250 OP 250 PS 637: Performed by: OBSTETRICS & GYNECOLOGY

## 2020-06-29 PROCEDURE — 25000125 ZZHC RX 250: Performed by: OBSTETRICS & GYNECOLOGY

## 2020-06-29 PROCEDURE — 25000132 ZZH RX MED GY IP 250 OP 250 PS 637

## 2020-06-29 PROCEDURE — 27110028 ZZH OR GENERAL SUPPLY NON-STERILE: Performed by: OBSTETRICS & GYNECOLOGY

## 2020-06-29 PROCEDURE — 25800030 ZZH RX IP 258 OP 636: Performed by: STUDENT IN AN ORGANIZED HEALTH CARE EDUCATION/TRAINING PROGRAM

## 2020-06-29 PROCEDURE — 83735 ASSAY OF MAGNESIUM: CPT | Performed by: OBSTETRICS & GYNECOLOGY

## 2020-06-29 PROCEDURE — 40000170 ZZH STATISTIC PRE-PROCEDURE ASSESSMENT II: Performed by: OBSTETRICS & GYNECOLOGY

## 2020-06-29 PROCEDURE — 37000009 ZZH ANESTHESIA TECHNICAL FEE, EACH ADDTL 15 MIN: Performed by: OBSTETRICS & GYNECOLOGY

## 2020-06-29 PROCEDURE — 88307 TISSUE EXAM BY PATHOLOGIST: CPT | Mod: 26 | Performed by: OBSTETRICS & GYNECOLOGY

## 2020-06-29 PROCEDURE — 82565 ASSAY OF CREATININE: CPT | Performed by: OBSTETRICS & GYNECOLOGY

## 2020-06-29 PROCEDURE — 25000132 ZZH RX MED GY IP 250 OP 250 PS 637: Performed by: STUDENT IN AN ORGANIZED HEALTH CARE EDUCATION/TRAINING PROGRAM

## 2020-06-29 PROCEDURE — 71000014 ZZH RECOVERY PHASE 1 LEVEL 2 FIRST HR: Performed by: OBSTETRICS & GYNECOLOGY

## 2020-06-29 PROCEDURE — 12000001 ZZH R&B MED SURG/OB UMMC

## 2020-06-29 PROCEDURE — 27210794 ZZH OR GENERAL SUPPLY STERILE: Performed by: OBSTETRICS & GYNECOLOGY

## 2020-06-29 PROCEDURE — 84450 TRANSFERASE (AST) (SGOT): CPT | Performed by: OBSTETRICS & GYNECOLOGY

## 2020-06-29 PROCEDURE — 36000059 ZZH SURGERY LEVEL 3 EA 15 ADDTL MIN UMMC: Performed by: OBSTETRICS & GYNECOLOGY

## 2020-06-29 PROCEDURE — 36000057 ZZH SURGERY LEVEL 3 1ST 30 MIN - UMMC: Performed by: OBSTETRICS & GYNECOLOGY

## 2020-06-29 PROCEDURE — 36415 COLL VENOUS BLD VENIPUNCTURE: CPT | Performed by: OBSTETRICS & GYNECOLOGY

## 2020-06-29 PROCEDURE — 25000125 ZZHC RX 250: Performed by: STUDENT IN AN ORGANIZED HEALTH CARE EDUCATION/TRAINING PROGRAM

## 2020-06-29 PROCEDURE — 85049 AUTOMATED PLATELET COUNT: CPT | Performed by: OBSTETRICS & GYNECOLOGY

## 2020-06-29 PROCEDURE — C9290 INJ, BUPIVACAINE LIPOSOME: HCPCS | Performed by: ANESTHESIOLOGY

## 2020-06-29 PROCEDURE — 88307 TISSUE EXAM BY PATHOLOGIST: CPT | Performed by: OBSTETRICS & GYNECOLOGY

## 2020-06-29 PROCEDURE — 37000008 ZZH ANESTHESIA TECHNICAL FEE, 1ST 30 MIN: Performed by: OBSTETRICS & GYNECOLOGY

## 2020-06-29 PROCEDURE — 25000128 H RX IP 250 OP 636: Performed by: ANESTHESIOLOGY

## 2020-06-29 PROCEDURE — 86780 TREPONEMA PALLIDUM: CPT | Performed by: OBSTETRICS & GYNECOLOGY

## 2020-06-29 RX ORDER — MISOPROSTOL 200 UG/1
400 TABLET ORAL
Status: DISCONTINUED | OUTPATIENT
Start: 2020-06-29 | End: 2020-07-01

## 2020-06-29 RX ORDER — NIFEDIPINE 10 MG/1
20 CAPSULE ORAL
Status: DISCONTINUED | OUTPATIENT
Start: 2020-06-29 | End: 2020-07-02 | Stop reason: HOSPADM

## 2020-06-29 RX ORDER — MODIFIED LANOLIN
OINTMENT (GRAM) TOPICAL
Status: DISCONTINUED | OUTPATIENT
Start: 2020-06-29 | End: 2020-07-02 | Stop reason: HOSPADM

## 2020-06-29 RX ORDER — LIDOCAINE 40 MG/G
CREAM TOPICAL
Status: DISCONTINUED | OUTPATIENT
Start: 2020-06-29 | End: 2020-07-02 | Stop reason: HOSPADM

## 2020-06-29 RX ORDER — HYDROCORTISONE 2.5 %
CREAM (GRAM) TOPICAL 3 TIMES DAILY PRN
Status: DISCONTINUED | OUTPATIENT
Start: 2020-06-29 | End: 2020-07-02 | Stop reason: HOSPADM

## 2020-06-29 RX ORDER — CITRIC ACID/SODIUM CITRATE 334-500MG
30 SOLUTION, ORAL ORAL
Status: COMPLETED | OUTPATIENT
Start: 2020-06-29 | End: 2020-06-29

## 2020-06-29 RX ORDER — IBUPROFEN 800 MG/1
800 TABLET, FILM COATED ORAL EVERY 6 HOURS PRN
Status: DISCONTINUED | OUTPATIENT
Start: 2020-06-29 | End: 2020-07-02 | Stop reason: HOSPADM

## 2020-06-29 RX ORDER — NIFEDIPINE 30 MG/1
30 TABLET, EXTENDED RELEASE ORAL EVERY 24 HOURS
Status: DISCONTINUED | OUTPATIENT
Start: 2020-06-29 | End: 2020-07-02 | Stop reason: HOSPADM

## 2020-06-29 RX ORDER — SODIUM CHLORIDE, SODIUM LACTATE, POTASSIUM CHLORIDE, CALCIUM CHLORIDE 600; 310; 30; 20 MG/100ML; MG/100ML; MG/100ML; MG/100ML
INJECTION, SOLUTION INTRAVENOUS CONTINUOUS
Status: DISCONTINUED | OUTPATIENT
Start: 2020-06-29 | End: 2020-06-29 | Stop reason: HOSPADM

## 2020-06-29 RX ORDER — ACETAMINOPHEN 325 MG/1
650 TABLET ORAL EVERY 4 HOURS PRN
Status: DISCONTINUED | OUTPATIENT
Start: 2020-07-02 | End: 2020-07-02 | Stop reason: HOSPADM

## 2020-06-29 RX ORDER — DEXTROSE, SODIUM CHLORIDE, SODIUM LACTATE, POTASSIUM CHLORIDE, AND CALCIUM CHLORIDE 5; .6; .31; .03; .02 G/100ML; G/100ML; G/100ML; G/100ML; G/100ML
INJECTION, SOLUTION INTRAVENOUS CONTINUOUS
Status: DISCONTINUED | OUTPATIENT
Start: 2020-06-29 | End: 2020-07-02 | Stop reason: HOSPADM

## 2020-06-29 RX ORDER — NIFEDIPINE 10 MG/1
10 CAPSULE ORAL
Status: DISCONTINUED | OUTPATIENT
Start: 2020-06-29 | End: 2020-06-29

## 2020-06-29 RX ORDER — MORPHINE SULFATE 1 MG/ML
INJECTION, SOLUTION EPIDURAL; INTRATHECAL; INTRAVENOUS PRN
Status: DISCONTINUED | OUTPATIENT
Start: 2020-06-29 | End: 2020-06-29

## 2020-06-29 RX ORDER — BISACODYL 10 MG
10 SUPPOSITORY, RECTAL RECTAL DAILY PRN
Status: DISCONTINUED | OUTPATIENT
Start: 2020-07-01 | End: 2020-07-02 | Stop reason: HOSPADM

## 2020-06-29 RX ORDER — SODIUM CHLORIDE, SODIUM LACTATE, POTASSIUM CHLORIDE, CALCIUM CHLORIDE 600; 310; 30; 20 MG/100ML; MG/100ML; MG/100ML; MG/100ML
INJECTION, SOLUTION INTRAVENOUS CONTINUOUS PRN
Status: DISCONTINUED | OUTPATIENT
Start: 2020-06-29 | End: 2020-06-29

## 2020-06-29 RX ORDER — ACETAMINOPHEN 325 MG/1
975 TABLET ORAL EVERY 6 HOURS
Status: DISCONTINUED | OUTPATIENT
Start: 2020-06-29 | End: 2020-07-02 | Stop reason: HOSPADM

## 2020-06-29 RX ORDER — SIMETHICONE 80 MG
80 TABLET,CHEWABLE ORAL 4 TIMES DAILY PRN
Status: DISCONTINUED | OUTPATIENT
Start: 2020-06-29 | End: 2020-07-02 | Stop reason: HOSPADM

## 2020-06-29 RX ORDER — KETOROLAC TROMETHAMINE 30 MG/ML
INJECTION, SOLUTION INTRAMUSCULAR; INTRAVENOUS PRN
Status: DISCONTINUED | OUTPATIENT
Start: 2020-06-29 | End: 2020-06-29

## 2020-06-29 RX ORDER — OXYCODONE HYDROCHLORIDE 5 MG/1
5 TABLET ORAL EVERY 4 HOURS PRN
Status: DISCONTINUED | OUTPATIENT
Start: 2020-06-29 | End: 2020-07-02 | Stop reason: HOSPADM

## 2020-06-29 RX ORDER — CEFAZOLIN SODIUM 1 G/3ML
1 INJECTION, POWDER, FOR SOLUTION INTRAMUSCULAR; INTRAVENOUS SEE ADMIN INSTRUCTIONS
Status: DISCONTINUED | OUTPATIENT
Start: 2020-06-29 | End: 2020-06-29 | Stop reason: HOSPADM

## 2020-06-29 RX ORDER — AMOXICILLIN 250 MG
2 CAPSULE ORAL 2 TIMES DAILY
Status: DISCONTINUED | OUTPATIENT
Start: 2020-06-29 | End: 2020-07-02 | Stop reason: HOSPADM

## 2020-06-29 RX ORDER — NALOXONE HYDROCHLORIDE 0.4 MG/ML
.1-.4 INJECTION, SOLUTION INTRAMUSCULAR; INTRAVENOUS; SUBCUTANEOUS
Status: DISCONTINUED | OUTPATIENT
Start: 2020-06-29 | End: 2020-07-02 | Stop reason: HOSPADM

## 2020-06-29 RX ORDER — OXYTOCIN/0.9 % SODIUM CHLORIDE 30/500 ML
100 PLASTIC BAG, INJECTION (ML) INTRAVENOUS CONTINUOUS
Status: DISCONTINUED | OUTPATIENT
Start: 2020-06-29 | End: 2020-07-01

## 2020-06-29 RX ORDER — ONDANSETRON 2 MG/ML
4 INJECTION INTRAMUSCULAR; INTRAVENOUS EVERY 6 HOURS PRN
Status: DISCONTINUED | OUTPATIENT
Start: 2020-06-29 | End: 2020-07-02 | Stop reason: HOSPADM

## 2020-06-29 RX ORDER — CARBOPROST TROMETHAMINE 250 UG/ML
250 INJECTION, SOLUTION INTRAMUSCULAR
Status: DISCONTINUED | OUTPATIENT
Start: 2020-06-29 | End: 2020-07-01

## 2020-06-29 RX ORDER — OXYTOCIN 10 [USP'U]/ML
10 INJECTION, SOLUTION INTRAMUSCULAR; INTRAVENOUS
Status: DISCONTINUED | OUTPATIENT
Start: 2020-06-29 | End: 2020-07-01

## 2020-06-29 RX ORDER — PHENYLEPHRINE HCL IN 0.9% NACL 1 MG/10 ML
SYRINGE (ML) INTRAVENOUS CONTINUOUS PRN
Status: DISCONTINUED | OUTPATIENT
Start: 2020-06-29 | End: 2020-06-29

## 2020-06-29 RX ORDER — CITRIC ACID/SODIUM CITRATE 334-500MG
SOLUTION, ORAL ORAL
Status: COMPLETED
Start: 2020-06-29 | End: 2020-06-29

## 2020-06-29 RX ORDER — AMOXICILLIN 250 MG
1 CAPSULE ORAL 2 TIMES DAILY
Status: DISCONTINUED | OUTPATIENT
Start: 2020-06-29 | End: 2020-07-02 | Stop reason: HOSPADM

## 2020-06-29 RX ORDER — BUPIVACAINE HYDROCHLORIDE 2.5 MG/ML
INJECTION, SOLUTION EPIDURAL; INFILTRATION; INTRACAUDAL PRN
Status: DISCONTINUED | OUTPATIENT
Start: 2020-06-29 | End: 2020-06-29

## 2020-06-29 RX ORDER — OXYTOCIN/0.9 % SODIUM CHLORIDE 30/500 ML
340 PLASTIC BAG, INJECTION (ML) INTRAVENOUS CONTINUOUS PRN
Status: DISCONTINUED | OUTPATIENT
Start: 2020-06-29 | End: 2020-07-01

## 2020-06-29 RX ORDER — BUPIVACAINE HYDROCHLORIDE 7.5 MG/ML
INJECTION, SOLUTION INTRASPINAL PRN
Status: DISCONTINUED | OUTPATIENT
Start: 2020-06-29 | End: 2020-06-29

## 2020-06-29 RX ORDER — MISOPROSTOL 200 UG/1
TABLET ORAL
Status: DISCONTINUED
Start: 2020-06-29 | End: 2020-06-29 | Stop reason: HOSPADM

## 2020-06-29 RX ORDER — AZITHROMYCIN 500 MG/5ML
500 INJECTION, POWDER, LYOPHILIZED, FOR SOLUTION INTRAVENOUS
Status: COMPLETED | OUTPATIENT
Start: 2020-06-29 | End: 2020-06-29

## 2020-06-29 RX ORDER — KETOROLAC TROMETHAMINE 30 MG/ML
30 INJECTION, SOLUTION INTRAMUSCULAR; INTRAVENOUS EVERY 6 HOURS
Status: COMPLETED | OUTPATIENT
Start: 2020-06-29 | End: 2020-06-30

## 2020-06-29 RX ORDER — CEFAZOLIN SODIUM IN 0.9 % NACL 3 G/100 ML
3 INTRAVENOUS SOLUTION, PIGGYBACK (ML) INTRAVENOUS
Status: COMPLETED | OUTPATIENT
Start: 2020-06-29 | End: 2020-06-29

## 2020-06-29 RX ORDER — FENTANYL CITRATE 50 UG/ML
INJECTION, SOLUTION INTRAMUSCULAR; INTRAVENOUS PRN
Status: DISCONTINUED | OUTPATIENT
Start: 2020-06-29 | End: 2020-06-29

## 2020-06-29 RX ADMIN — Medication 25 MCG/MIN: at 14:59

## 2020-06-29 RX ADMIN — KETOROLAC TROMETHAMINE 30 MG: 30 INJECTION, SOLUTION INTRAMUSCULAR at 16:13

## 2020-06-29 RX ADMIN — HYDRALAZINE HYDROCHLORIDE 10 MG: 20 INJECTION INTRAMUSCULAR; INTRAVENOUS at 18:49

## 2020-06-29 RX ADMIN — NIFEDIPINE 10 MG: 10 CAPSULE, LIQUID FILLED ORAL at 19:58

## 2020-06-29 RX ADMIN — MORPHINE SULFATE 0.1 MG: 1 INJECTION, SOLUTION EPIDURAL; INTRATHECAL; INTRAVENOUS at 14:57

## 2020-06-29 RX ADMIN — HYDRALAZINE HYDROCHLORIDE 5 MG: 20 INJECTION INTRAMUSCULAR; INTRAVENOUS at 18:19

## 2020-06-29 RX ADMIN — Medication 25 MCG: at 01:35

## 2020-06-29 RX ADMIN — SODIUM CHLORIDE, POTASSIUM CHLORIDE, SODIUM LACTATE AND CALCIUM CHLORIDE: 600; 310; 30; 20 INJECTION, SOLUTION INTRAVENOUS at 14:21

## 2020-06-29 RX ADMIN — Medication 500 MG: at 14:29

## 2020-06-29 RX ADMIN — SODIUM CITRATE AND CITRIC ACID MONOHYDRATE 30 ML: 500; 334 SOLUTION ORAL at 13:52

## 2020-06-29 RX ADMIN — KETOROLAC TROMETHAMINE 30 MG: 30 INJECTION, SOLUTION INTRAMUSCULAR at 23:41

## 2020-06-29 RX ADMIN — Medication 100 ML/HR: at 18:54

## 2020-06-29 RX ADMIN — BUPIVACAINE HYDROCHLORIDE IN DEXTROSE 1.7 ML: 7.5 INJECTION, SOLUTION SUBARACHNOID at 14:57

## 2020-06-29 RX ADMIN — BUPIVACAINE 20 ML: 13.3 INJECTION, SUSPENSION, LIPOSOMAL INFILTRATION at 16:35

## 2020-06-29 RX ADMIN — BUPIVACAINE HYDROCHLORIDE 20 ML: 2.5 INJECTION, SOLUTION EPIDURAL; INFILTRATION; INTRACAUDAL at 16:35

## 2020-06-29 RX ADMIN — OXYTOCIN-SODIUM CHLORIDE 0.9% IV SOLN 30 UNIT/500ML 300 ML/HR: 30-0.9/5 SOLUTION at 15:15

## 2020-06-29 RX ADMIN — SODIUM CHLORIDE, POTASSIUM CHLORIDE, SODIUM LACTATE AND CALCIUM CHLORIDE 75 ML/HR: 600; 310; 30; 20 INJECTION, SOLUTION INTRAVENOUS at 05:09

## 2020-06-29 RX ADMIN — FENTANYL CITRATE 10 MCG: 50 INJECTION, SOLUTION INTRAMUSCULAR; INTRAVENOUS at 14:57

## 2020-06-29 RX ADMIN — NIFEDIPINE 10 MG: 10 CAPSULE, LIQUID FILLED ORAL at 20:22

## 2020-06-29 RX ADMIN — ACETAMINOPHEN 975 MG: 325 TABLET, FILM COATED ORAL at 18:05

## 2020-06-29 RX ADMIN — MAGNESIUM SULFATE IN WATER 2 G/HR: 40 INJECTION, SOLUTION INTRAVENOUS at 18:46

## 2020-06-29 RX ADMIN — MAGNESIUM SULFATE IN WATER 2 G/HR: 40 INJECTION, SOLUTION INTRAVENOUS at 08:11

## 2020-06-29 RX ADMIN — OXYCODONE HYDROCHLORIDE 5 MG: 5 TABLET ORAL at 21:51

## 2020-06-29 RX ADMIN — Medication 30 ML: at 13:52

## 2020-06-29 RX ADMIN — ACETAMINOPHEN 975 MG: 325 TABLET, FILM COATED ORAL at 23:40

## 2020-06-29 RX ADMIN — TRANEXAMIC ACID 1 G: 1 INJECTION, SOLUTION INTRAVENOUS at 15:33

## 2020-06-29 RX ADMIN — Medication 25 MCG: at 05:50

## 2020-06-29 RX ADMIN — Medication 25 MCG: at 11:01

## 2020-06-29 RX ADMIN — NIFEDIPINE 30 MG: 30 TABLET, FILM COATED, EXTENDED RELEASE ORAL at 19:19

## 2020-06-29 RX ADMIN — Medication 3 G: at 14:29

## 2020-06-29 ASSESSMENT — MIFFLIN-ST. JEOR: SCORE: 2131.14

## 2020-06-29 NOTE — OP NOTE
[facility]  Full Operative Progress Note     Surgery Date:  2020    Surgeon:  Mago Dumont MD    Assistants:  CHUNG Laird Fellow    Pre-op Diagnosis:  1. Intrauterine pregnancy at 29w3d                                      2. Category II FHT, remote from delivery                                      3. IUGR with AC 3%ile                                      4. Abnormal UA dopplers                                      5. Pre-eclampsia with severe features based on BP                                      6. Morbid Obesity                                      7. GBS colonization     Post-op Diagnosis:  1. Same      2. Liveborn male infant     Procedure:  Primary low-transverse  section with double layer uterine closure via Pfannenstiel incision    Anesthesia: Spinal    QBL:  1450 mL    IVF:  600 mL crystalloid    UOP:  250 mL clear urine at the end of the case    Drains: [place] Catheter     Specimens:  Cord blood, cord gases, placenta    Complications: None apparent    Indications:   Shanda Manrique is a 29 year old  female at 28w6d admitted for management of abnormal UA dopplers in the setting of fetal growth restriction. On admission she received betamethasone for fetal lung maturity.  After four days on antepartum, she went on to develop pre-eclampsia with severe features and required IV anti-hypertensives for management of her blood pressure. Given fetal indications and now pre-eclampsia with severe features, delivery was recommended. Initial attempt were made to proceed with vaginal delivery due to cephalic presentation. She received cerividil and multiple doses of cytotec with minimal cervical progression. When the fetus developed a Category II FHT in addition,  section was recommended. The risks, benefits, and alternatives of  section were discussed with the patient, and she agreed to proceed.     Findings:   1. No recto-fascial or  intraabdominal adhesions  2. Port wine stained amniotic fluid. Placenta intact with 3 vessel cord.  3. Liveborn male infant in cephalic presentation. Apgars 6 at 1 minute & 9 at 5 minutes. Weight pending  4. Arterial cord pH 7.30, base deficit 0.2. Venous cord pH 7.34, base deficit 0.9.  5. Normal uterus, fallopian tubes, and ovaries.     Procedure Details:   The patient was brought to the OR, where adequate Spinal anesthesia was administered.  She was placed in the dorsal supine position with a slight leftward tilt. She was prepped and draped in the usual sterile fashion. A surgical time out was performed. A pfannenstiel skin incision was made with the scalpel, and carried down to the underlying fascia with sharp and blunt dissection. The fascia was incised in the midline, and the incision was extended laterally, superiorly, and inferiorly with blunt force. The rectus muscles were  in the midline, and the peritoneum was entered bluntly, and the opening was extended with digital pressure. The bladder blade was placed.  A transverse hysterotomy was made with the scalpel in the lower uterine segment, and the incision was extended with digital pressure. The infant was noted to be in the cephalic position, and was delivered atraumatically. The shoulders delivered easily and the  was placed in a bag.  The cord was doubly clamped and cut after 30 seconds, and the infant was handed off to the awaiting NICU staff. A segment of cord was cut and handed it off for cord gases. The placenta was delivered with gentle traction on the umbilical cord and uterine massage. The uterus was exteriorized and cleared of all clots and debris. Uterine tone was noted to be firm with 20 units of pitocin given through the running IV and uterine massage.  She received intra-operative TXA to assist with oozing. The hysterotomy was closed with a running locked suture of 0 Vicryl.  The hysterotomy was then imbricated using an 0  Monocryl suture. An additional figure of eight stitch was placed in the midline to attain hemostasis. The posterior cul-de-sac was cleared of all clots and debris. The uterus was returned to the abdomen. The pericolic gutters were cleared of all clots and debris. The hysterotomy was reexamined and noted to be hemostatic. The fascia and rectus muscles were examined and areas of oozing were controlled with electrocautery. The fascia was closed with a running 0 Vicryl suture. The subcutaneous tissue was irrigated and areas of oozing were controlled with electrocautery. The subcutaneous tissue was greater than 2 cm in thickness, and was therefore closed with 2-0 Plain. The skin was closed with 4-0 vicryl and covered with a sterile dressing.    All sponge, needle, and instrument counts were correct. The patient tolerated the procedure well, and was transferred to recovery in stable condition. Dr. Mago Dumont was present and scrubbed for the entirety of the procedure.     Renetta Javier MD  Maternal Fetal Medicine Fellow  6/29/2020 4:29 PM        Physician Attestation   I spent a total of 60 minutes with the patient, personally observing as the fellow assisted with PLTCS.     Mago Dumont  Date of Service (when I saw the patient): 06/29/20

## 2020-06-29 NOTE — ANESTHESIA POSTPROCEDURE EVALUATION
Anesthesia POST Procedure Evaluation    Patient: Shanda Manrique   MRN:     [id number] Gender:   female   Age:    29 year old :      1990        Preoperative Diagnosis: * No pre-op diagnosis entered *   Procedure(s):   SECTION   Postop Comments: No value filed.     Anesthesia Type: Spinal, For Post-op pain in coordination with surgeon       Disposition: Admission   Postop Pain Control: Uneventful            Sign Out: Well controlled pain   PONV: No   Neuro/Psych: Uneventful            Sign Out: Acceptable/Baseline neuro status   Airway/Respiratory: Uneventful            Sign Out: Acceptable/Baseline resp. status   CV/Hemodynamics: Uneventful            Sign Out: Acceptable CV status   Other NRE: NONE   DID A NON-ROUTINE EVENT OCCUR? No         Last Anesthesia Record Vitals:  CRNA VITALS  2020 1545 - 2020 1645      2020             Pulse:  66    SpO2:  99 %    Resp Rate (observed):  (!) 1          Last PACU Vitals:  Vitals Value Taken Time   /98 2020  5:35 PM   Temp 36.7  C (98  F) 2020  4:35 PM   Pulse 60 2020  5:35 PM   Resp 14 2020  5:38 PM   SpO2 99 % 2020  5:38 PM   Temp src     NIBP     Pulse     SpO2     Resp     Temp     Ht Rate     Temp 2     Vitals shown include unvalidated device data.      Electronically Signed By: Royal Avilez DO, 2020, 5:39 PM

## 2020-06-29 NOTE — PROGRESS NOTES
Labor Progress  Note    S:  Patient reports that she is feeling some cramping.     O:   Patient Vitals for the past 24 hrs:   BP Temp Temp src Heart Rate Resp Weight   20 1040 (!) 144/79 98.4  F (36.9  C) Oral -- 16 --   20 0955 -- -- -- -- -- 133.2 kg (293 lb 9.6 oz)   20 0905 134/79 98.6  F (37  C) Oral -- 18 --   20 0547 135/74 -- -- -- -- --   20 0411 (!) 159/84 98.8  F (37.1  C) Oral -- -- --   20 0010 136/82 98.7  F (37.1  C) Oral -- -- --   20 1947 132/77 98.9  F (37.2  C) Oral -- -- --   20 1715 (!) 153/87 98.4  F (36.9  C) Oral 82 20 --   Gen: Resting comfortably  CV: Regular rate  Pulm: Breathing comfortably on room air  Abd: Soft, non-tender, gravid    Cervix: C/70/H at 1000  Membranes: intact    FHT:  ,  Minimal variability, no accels, recurrent likely late decelerations  [place]: 2-3 contractions/ 10 minutes.  Patient marking contractions with button    A/P:  Shanda Manrique is a 29 year old  female at 29w3d by 7w4d US, admitted to antepartum service for severe fetal growth restriction with abnormal umbilical artery dopplers. Now meeting criteria for pre-eclampsia with severe features and undergoing IOL now with category 2 FHT remote from delivery.         FWB:  Known IUGR with lagging AC and abnormal UA dopplers.  Currently recurrent late decelerations.  Previously given IV fluid bolus and repositioning with minimal improvement in FHT.  At this time minimal variability.  Significant decelerations with >50% of contractions for 30 minutes.  Patient is in latent labor.  Last cervix check with closed cervix at 1000.  Reviewed fetal heart rate tracing with patient and recommendation for  section given category 2 FHT remote from delivery.  Reviewed strip and types of decelerations with patient.  Patient and her friend would like time to discuss the recommendation for . Will return for further discussion and review of consent now.      Mago Dumont MD    Addendum:  Patient does agree to proceed with  section.  Explained potential need for classical uterine incision if lower uterine segment is not large enough for low transverse uterine incision.  Also discussed that it would mean all future deliveries would be  sections.  Reviewed indications, alternatives, benefits, and risks including bleeding, infection, injury to surrounding organs.  Patient states she would accept a blood transfusion if medically necessary.  Consent reviewed and confirmed signed.  Reviewed pain control and post op expectations.  Patient is fearful of pain with procedure and sad that she did not have her natural delivery as she desired.      Mago Dumont MD

## 2020-06-29 NOTE — PROVIDER NOTIFICATION
06/29/20 1723   Provider Notification   Provider Name/Title Dr. Dumont   Method of Notification Electronic Page   Notification Reason Vital Signs Change     Provider notified patient's BP has been running 130s-156/80s-105. no severe range at this time

## 2020-06-29 NOTE — PLAN OF CARE
[facility] electric home monitoring called re: monitor cut off to go to surgery. Stated for pt to return the monitor. Monitor given back to pt and pt agrees with plan.

## 2020-06-29 NOTE — PROGRESS NOTES
Patient meets criteria for PPE conservation. Standard Precautions is acceptable; Contact Precautions are not needed. If patient s condition changes (i.e. patient has a new MDRO positive during this admission, develops diarrhea or urine incontinence, or has new uncontrolled drainage, abscesses, or boils), patient should be placed into Contact Precautions, and Contact isolation added back to the chart. Infection Prevention will review patient charts Monday- Friday. It is not necessary to contact the IP on-call for review.

## 2020-06-29 NOTE — ANESTHESIA PROCEDURE NOTES
Peripheral Nerve Block Procedure Note  Staff -   Anesthesiologist:  Royal Avilez DO  Resident/Fellow: Joanie Louise MD    Performed By: Resident  Procedure performed by resident/CRNA in presence of a teaching physician.        Location: OB  Procedure Start/Stop TImes:     patient identified, IV checked, site marked, risks and benefits discussed, informed consent, monitors and equipment checked, pre-op evaluation, at physician/surgeon's request and post-op pain management      Correct Patient: Yes      Correct Position: Yes      Correct Site: Yes      Correct Procedure: Yes      Correct Laterality:  Yes    Site Marked:  Yes  Procedure details:     Procedure:  TAP    ASA:  3    Laterality:  Bilateral    Position:  Supine    Sterile Prep: chloraprep      Needle:  Insulated    Needle length (mm):  110    Infusion Method:  Single Shot    Blood aspirated via catheter: No      Complications:  None  Assessment/Narrative:     Injection made incrementally with aspirations every (mL):  5

## 2020-06-29 NOTE — PROGRESS NOTES
Labor Progress and Magnesium Check Note    S:  Resting comfortably. No SOB, chest pain, or RUQ pain. Some mild cramping.     O:   Patient Vitals for the past 24 hrs:   BP Temp Temp src Heart Rate Resp Weight   20 0411 (!) 159/84 98.8  F (37.1  C) Oral -- -- --   20 0010 136/82 98.7  F (37.1  C) Oral -- -- --   20 1947 132/77 98.9  F (37.2  C) Oral -- -- --   20 1715 (!) 153/87 98.4  F (36.9  C) Oral 82 20 --   20 1150 139/83 98.7  F (37.1  C) Oral -- -- --   20 0750 -- -- -- -- -- 130.7 kg (288 lb 3.2 oz)   Gen: Resting comfortably  CV: Regular rate  Pulm: Breathing comfortably, CTAB and no crakles  Abd: Soft, non-tender, gravid  Ext: 1+ edema, 1+ reflexes b/l UE, no clonus    Cervix: C/70/H at 0550  Membranes: intact    FHT: Discontinuous tracing: , minimal to moderate variability, absent accels, few variable decels  [place]: Rare contractions    I/O:   175 ml/h most recently    Magnesium   Date Value Ref Range Status   2020 3.5 (H) 1.6 - 2.3 mg/dL Final     A/P:  Shanda Manrique is a 29 year old  female at 29w3d by 7w4d US, admitted to antepartum service for severe fetal growth restriction with abnormal umbilical artery dopplers. Now meeting criteria for pre-eclampsia with severe features and undergoing IOL.     Pre-eclampsia with severe features:   Based on sustained severe range blood pressures requiring IV hydralazine x1 on .  Currently normal to mild range  - UPC 0.16 (), HELLP labs wnl. Repeat as clinically indicated.  - Continue frequent BP monitoring. Notify MD with 160/110 or greater. PRN labetalol or hydralazine for sustained severe range blood pressures.   - Magnesium: 4g>2g/hr>lvl 3.5, not therapeutic.  Will repeat HELLP labs and Mag level this morning.  - Symptoms: Headache.  Attributes to lack of caffeine.  Feels it improves with tylenol and drinking caffeine.   - UOP: Adequate, continue strict I/O's   - Patient consented for CS, possible  classical, if fetal intolerance of labor.    IOL:   - S/p cervidil and PV miso x5, remains closed, but softer and more effaced. Discussed flor balloon at next check if cervix is open.  - S/p anesthesia consult, will plan for early epidural given high risk of needing CS due to fetal intolerance  - S/p CS consent  - Penicillin for GBS positive in labor     Fetal growth restriction with lagging AC  Abnormal umbilical artery dopplers  Growth ultrasound  notable for EFW  29%, AC 3%, new diagnosis of growth restriction. On , fetus previously normally grown EFW 51%.  - UA dopplers stable (absent), BPP 8/  - S/p betamethasone ,   - S/p NICU consult, NICU aware of IOL  - FHT currently category II with few variable decels, overall seems appropriate for GA on magnesium. Continue to monitor closely. Intrauterine resuscitation prn.  Discontinuous monitoring at this time. Difficult to monitor due to patient's body habitus.  Will continue to adjust monitor for continuous FHT.     History of drug use  Tobacco use  House arrest, ankle bracelet  - UDS + marijuana on   - Social work consult postpartum  - Declines nicotine replacement  - If CS indicated, will cut off ankle bracelet to avoid electrocautery burns, okay per [facility] home monitoring, but need to provide documentation to patient.    Lemuel Landeros MD  OB/GYN Resident, PGY-3  2020 6:01 AM     Physician Attestation   I, Mago Dumont MD, personally examined and evaluated this patient.  I discussed the patient with the resident/fellow and care team, and agree with the assessment and plan of care as documented in the note of 20.      I personally reviewed vital signs, medications and labs.    [place] findings: Shanda Manrique is a 29 year old  at 29w3d by 7w4d us who is currently undergoing IOL for pre-e w/ SF and pregnancy complicated by fetal growth restriction with lagging AC and abnormal UA dopplers.  Induction of labor started  with cervidil, now on 5th dose of vaginal cytotec with some softening and effacement of cervix, but no dilation.  Discussed with patient preferred route of delivery is vaginal, and possibility of classical  section if fetal intolerance or failed induction.  Also discussed flor balloon with next check if possible.  Patient will consider this option.  FHT is discontinuous.  Discussed with RN regarding need for more continuous monitoring given intermittent decelerations.  Patient up to bathroom now and will be placed on monitor when returns to bed. Will repeat HELLP labs and magnesium level now.    Mago Dumont MD  Date of Service (when I saw the patient): 20

## 2020-06-29 NOTE — PROGRESS NOTES
Labor Progress and Magnesium Check Note    S:  Resting comfortably. Still has ear pressure, but headache better. No SOB, chest pain, or RUQ pain. Some mild cramping.     O:   Patient Vitals for the past 24 hrs:   BP Temp Temp src Heart Rate Resp Weight   20 0955 -- -- -- -- -- 133.2 kg (293 lb 9.6 oz)   20 0905 134/79 98.6  F (37  C) Oral -- 18 --   20 0547 135/74 -- -- -- -- --   20 0411 (!) 159/84 98.8  F (37.1  C) Oral -- -- --   20 0010 136/82 98.7  F (37.1  C) Oral -- -- --   20 1947 132/77 98.9  F (37.2  C) Oral -- -- --   20 1715 (!) 153/87 98.4  F (36.9  C) Oral 82 20 --   20 1150 139/83 98.7  F (37.1  C) Oral -- -- --   Gen: Resting comfortably  CV: Regular rate  Pulm: Breathing comfortably, CTAB and no crakles  Abd: Soft, non-tender, gravid  Ext: 1+ edema, 1+ reflexes b/l UE, no clonus    Cervix: C/70/H at 1000  Membranes: intact    FHT:  ,  moderate variability, +accels, few variable decels  [place]: Rare contractions    I/O:   500 ml over last 3 hours    Magnesium   Date Value Ref Range Status   2020 4.4 (H) 1.6 - 2.3 mg/dL Final     A/P:  Shanda Manrique is a 29 year old  female at 29w3d by 7w4d US, admitted to antepartum service for severe fetal growth restriction with abnormal umbilical artery dopplers. Now meeting criteria for pre-eclampsia with severe features and undergoing IOL.     Pre-eclampsia with severe features:   Based on sustained severe range blood pressures requiring IV hydralazine x1 on .  Currently normal to mild range  - UPC 0.16 (), HELLP labs wnl. Repeat as clinically indicated.  - Continue frequent BP monitoring. Notify MD with 160/110 or greater. PRN labetalol or hydralazine for sustained severe range blood pressures.   - Magnesium: 4g>2g/hr>lvl 3.5>4.4, continue at 2g/hr   - Symptoms: Headache improved this AM.  Attributes to lack of caffeine.  Feels it improves with tylenol and drinking caffeine.   - UOP:  Adequate, continue strict I/O's   - Patient consented for CS, possible classical, if fetal intolerance of labor.    IOL:   - S/p cervidil and PV miso x5, remains closed, but softer and more effaced. Unable to place flor. Plan to give miso #6 and then reassess for flor. If still tracing fetus well and FHT is reassuring, can transition to pitocin if unable to place flor as patient strongly desires .   - S/p anesthesia consult, will plan for early epidural given high risk of needing CS due to fetal intolerance  - S/p CS consent  - Penicillin for GBS positive in labor     Fetal growth restriction with lagging AC  Abnormal umbilical artery dopplers  Growth ultrasound  notable for EFW  29%, AC 3%, new diagnosis of growth restriction. On , fetus previously normally grown EFW 51%.  - UA dopplers stable (absent), BPP 8/8   - S/p betamethasone ,   - S/p NICU consult, NICU aware of IOL  - FHT currently category I,  Will continue to adjust monitor for continuous FHT.     History of drug use  Tobacco use  House arrest, ankle bracelet  - UDS + marijuana on   - Social work consult postpartum  - Declines nicotine replacement  - If CS indicated, will cut off ankle bracelet to avoid electrocautery burns, okay per [facility] home monitoring, but need to provide documentation to patient.    Christiano Mckeon MD, MPH  OBGYN PGY-3  2020 10:19 AM

## 2020-06-29 NOTE — PROVIDER NOTIFICATION
06/29/20 0055   Provider Notification   Provider Name/Title G3   Method of Notification Electronic Page   Request Evaluate - Remote   Notification Reason Status Update     Paged provider to discuss next dose of ripening medication.

## 2020-06-29 NOTE — BRIEF OP NOTE
[facility]   Brief Operative Note     Surgery Date: 2020    Surgeon:  Mago Dumont MD    Assistants:  CHUNG Laird Fellow    Pre-op Diagnosis:  1. Intrauterine pregnancy at 29w3d     2. Category II FHT, remote from delivery     3. IUGR with AC 3%ile     4. Abnormal UA dopplers     5. Pre-eclampsia with severe features based on BP     6. Morbid Obesity     7. GBS colonization     Post-op Diagnosis:  1. Same      2. Liveborn male infant     Procedure:  Primary low-transverse  section with double layer uterine closure via Pfannenstiel incision    Anesthesia: Spinal    QBL:  1450 mL    IVF:  600 mL crystalloid    UOP:  250 mL clear urine at the end of the case    Drains: Austin Catheter     Specimens:  Cord blood, Cord gases, placenta    Complications: None apparent    Findings:   1. No recto-fascial or intraabdominal adhesions  2. Port wine stained amniotic fluid. Placenta intact with 3 vessel cord.  3. Liveborn male infant in cephalic presentation. Apgars and weight not yet recorded.  4. Arterial cord pH 7.30, base deficit 0.2. Venous cord pH 7.34, base deficit 0.9.  5. Normal uterus, fallopian tubes, and ovaries.     Disposition:  Transferred in stable condition to MILLICENT Javier MD  Maternal Fetal Medicine Fellow  2020 4:04 PM

## 2020-06-29 NOTE — PLAN OF CARE
Provider notification:  Provider Name: TIM Dumont MD. Notified at 1132 regarding a persistent category II fetal heart rate tracing for 30 minutes.   Baseline rate 135, normal  Variability minimal  Accelerations present  Decelerations present, VD and other (gradual decelerations, toco not tracing any contractions)    EFM interpretation suggests absence of concern for fetal metabolic acidemia at this time due to accelerations present    Uterine Activity normal/regular.    Interventions to improve fetal oxygenation for a category II tracing include:maternal positioning, IV fluid bolus , increase frequency of assessment and consult Category 2 algorithm    After discussion with provider:Plan reassessment in 30 minutes

## 2020-06-29 NOTE — PROVIDER NOTIFICATION
06/29/20 0500   Uterine Activity Assessment   Method external tocotransducer   Contraction Frequency (Minutes) none   Contraction Intensity Patient not feeling   Uterine Resting Tone soft by palpation   Fetal Assessment   Fetal HR Assessment Method external US   Fetal HR (beats/min) 135   Fetal Heart Baseline Rate normal range   Fetal HR Variability moderate (amplitude range 6 to 25 bpm)   Fetal HR Accelerations present   Fetal HR Decelerations variable     Provider reviewed strip. No new orders at this time.

## 2020-06-29 NOTE — PROVIDER NOTIFICATION
06/29/20 0845   Provider Notification   Provider Name/Title TIM Dumont   Method of Notification Phone   Notification Reason Decels     Discussed difficulty obtaining continuous tracing due to materal position, declines adjustment. RN at bedside continuously holding monitor in place. Decels noted, toco not tracing contractions.

## 2020-06-29 NOTE — PROGRESS NOTES
Labor Progress and Magnesium Check Note    S:  Headache has resolved. No SOB, chest pain, or RUQ pain. Some mild cramping.     O:   Patient Vitals for the past 24 hrs:   BP Temp Temp src Heart Rate Resp Weight   20 0010 136/82 98.7  F (37.1  C) Oral -- -- --   20 1947 132/77 98.9  F (37.2  C) Oral -- -- --   20 1715 (!) 153/87 98.4  F (36.9  C) Oral 82 20 --   20 1150 139/83 98.7  F (37.1  C) Oral -- -- --   20 0750 -- -- -- -- -- 130.7 kg (288 lb 3.2 oz)   20 0400 (!) 141/85 98.5  F (36.9  C) Oral -- 20 --   Gen: Resting comfortably  CV: Regular rate  Pulm: Breathing comfortably, CTAB and no crakles  Abd: Soft, non-tender, gravid  Ext: No edema, 1+ reflexes b/l UE, no clonus    Cervix: C/70/H  Membranes: intact    FHT: , minmal to mod sherrill, 10x10 accels, no decels  [place]: Rare contractions    I/O:   240 ml/h most recently      Magnesium   Date Value Ref Range Status   2020 3.5 (H) 1.6 - 2.3 mg/dL Final     A/P:  Shanda Manrique is a 29 year old  female at 29w3d by 7w4d US, admitted to antepartum service for severe fetal growth restriction with abnormal umbilical artery dopplers. Now meeting criteria for pre-eclampsia with severe features and undergoing IOL.     # Pre-eclampsia with severe features:   - UPC 0.16 (), HELLP labs wnl. Repeat as clinically indicated.  - S/p IV hydralazine x1 at 1300, . Currently normal to mild range. Continue frequent BP monitoring. Notify MD with 160/110 or greater. PRN labetalol or hydralazine for sustained severe range blood pressures.   - Magnesium: 4g>2g/hr>lvl 3.5. No evidence of toxicity.   - Symptoms: Denies currently  - UOP: Adequate, continue strict I/O's   - Given Pre-E w/ SF and IUGR with abnormal dopplers, inducing labor. Patient consented for CS, possible classical, if fetal intolerance of labor.    # IOL:   - S/p cervidil and PV miso x3, still closed though thinned out. Another PV miso placed. Austin prn.  - S/p  anesthesia consult, will plan for early epidural given high risk of needing CS due to fetal intolerance  - S/p CS consent  - Penicillin for GBS positive in labor     # Fetal growth restriction with lagging AC  # Abnormal umbilical artery dopplers  Growth ultrasound 6/24 notable for EFW  29%, AC 3%, new diagnosis of growth restriction. On 5/20, fetus previously normally grown EFW 51%.  - UA dopplers stable (absent), BPP 8/8 6/27  - S/p betamethasone 6/24, 6/25  - GBS positive, treat with PCN in labor  - S/p NICU consult, NICU aware of IOL  - FHT currently category I and appropriate for gestational age during labor     # History of drug use  # Tobacco use  # House arrest, ankle bracelet  - UDS + marijuana  - Social work has seen her  - Declines nicotine replacement  - If CS indicated, will cut off ankle bracelet to avoid electrocautery burns, okay per [facility] home monitoring, but need to provide documentation to patient.    Lemuel Landeros MD  OB/GYN Resident, PGY-3  6/29/2020 1:27 AM

## 2020-06-29 NOTE — PROVIDER NOTIFICATION
06/29/20 1817   Provider Notification   Provider Name/Title G3   Method of Notification Electronic Page   Request Evaluate-Remote   Notification Reason Vital Signs Change     Provider notified Patients BP in severe range 162/102, repeat 167/100.

## 2020-06-29 NOTE — PROVIDER NOTIFICATION
06/29/20 1132   Provider Notification   Provider Name/Title MD TIM Dumont   Method of Notification In Department   Notification Reason Decels     Notified of decelerations present from 1105. Pt reports cramping, toco not registering contractions, pt given marylin button. At 1121, pt repositioning, unable to determine if decel or discontinuous tracing. MD notified, EFM reviewed. 250mL fluid bolus started per MD, pt positioned to left side. NPO for now. Continue to monitor closely.

## 2020-06-29 NOTE — PROVIDER NOTIFICATION
06/29/20 0500   Provider Notification   Provider Name/Title Dr. Landeros   Method of Notification Electronic Page   Request Evaluate - Remote   Notification Reason Status Update     Updated provider on status. Requested provider review strip. Updated on VD.

## 2020-06-29 NOTE — PROVIDER NOTIFICATION
06/29/20 1853   Provider Notification   Provider Name/Title G3   Method of Notification Electronic Page   Notification Reason Vital Signs Change     Provider notified repeat severe range of 162/96, gave 10 of hydralazine per order

## 2020-06-29 NOTE — PLAN OF CARE
"Pt VSS. /77   Temp 98.9  F (37.2  C) (Oral)   Resp 20   Ht 1.753 m (5' 9\")   Wt 130.7 kg (288 lb 3.2 oz)   LMP 10/30/2019   SpO2 100%   BMI 42.56 kg/m  . Pt continues IOL process. Pt receiving vaginal Misoprostol. Pt reports occasional cramping. Difficult to trace FHR. Will continue to support. Pt appropriate.     No changes. Pt received another dose of Misoprostol. Cervical exam unchanged. Will continue to monitor.   "

## 2020-06-29 NOTE — PROVIDER NOTIFICATION
06/28/20 2230   Uterine Activity Assessment   Method external tocotransducer   Contraction Frequency (Minutes) x1   Contraction Duration (seconds) 60   Contraction Intensity Patient reports cramping   Uterine Resting Tone soft by palpation   Fetal Assessment   Fetal HR Assessment Method external US   Fetal HR (beats/min) 140   Fetal Heart Baseline Rate normal range   Fetal HR Variability   (JENARO)   Fetal HR Accelerations   (JENARO)   Fetal HR Decelerations   (JENARO)     RN at bedside frequently to reposition monitors. Provider updated. Difficult to trace despite multiple positions. Will continue to attempt to monitor.

## 2020-06-29 NOTE — ANESTHESIA CARE TRANSFER NOTE
Patient: Shanda Manrique    Procedure(s):   SECTION    Diagnosis: * No pre-op diagnosis entered *  Diagnosis Additional Information: No value filed.    Anesthesia Type:   Spinal, For Post-op pain in coordination with surgeon     Note:  Airway :Room Air  Patient transferred to:PACU  Comments: VSS. Breathing spontaneously at a regular rate with adequate tidal volumes and maintaining O2 sats on RA. Denies nausea or pain. No apparent complications from anesthesia.     Joanie Louise MD [facility] [place] pager [phone]  Anesthesia CA-1  Handoff Report: Identifed the Patient, Identified the Reponsible Provider, Reviewed the pertinent medical history, Discussed the surgical course, Reviewed Intra-OP anesthesia mangement and issues during anesthesia, Set expectations for post-procedure period and Allowed opportunity for questions and acknowledgement of understanding      Vitals: (Last set prior to Anesthesia Care Transfer)    CRNA VITALS  2020 1545 - 2020 1645      2020             Pulse:  66    SpO2:  99 %    Resp Rate (observed):  (!) 1                Electronically Signed By: Joanie Louise MD  2020  5:28 PM

## 2020-06-29 NOTE — ANESTHESIA PREPROCEDURE EVALUATION
"Anesthesia Pre-Procedure Evaluation    Patient: Shanda Manrique   MRN:     [id number] Gender:   female   Age:    29 year old :      1990        Preoperative Diagnosis: * No pre-op diagnosis entered *   Procedure(s):   SECTION     LABS:  CBC:   Lab Results   Component Value Date    WBC 10.4 2020    WBC 8.5 2020    HGB 11.8 2020    HGB 11.8 2020    HCT 35.3 2020    HCT 36.9 2020     2020     2020     BMP:   Lab Results   Component Value Date    POTASSIUM 4.1 2020    CR 0.52 2020    CR 0.52 2020    GLC 93 2020     COAGS: No results found for: PTT, INR, FIBR  POC: No results found for: BGM, HCG, HCGS  OTHER:   Lab Results   Component Value Date    MAG 4.4 (H) 2020    ALT 10 2020    AST 6 2020        Preop Vitals    BP Readings from Last 3 Encounters:   20 (!) 144/79   20 (!) 148/90   20 (!) 132/90    Pulse Readings from Last 3 Encounters:   20 74   20 88      Resp Readings from Last 3 Encounters:   20 16   20 18    SpO2 Readings from Last 3 Encounters:   20 100%      Temp Readings from Last 1 Encounters:   20 36.9  C (98.4  F) (Oral)    Ht Readings from Last 1 Encounters:   20 1.753 m (5' 9\")      Wt Readings from Last 1 Encounters:   20 133.2 kg (293 lb 9.6 oz)    Estimated body mass index is 43.36 kg/m  as calculated from the following:    Height as of this encounter: 1.753 m (5' 9\").    Weight as of this encounter: 133.2 kg (293 lb 9.6 oz).     LDA:  Peripheral IV 20 Right Lower forearm (Active)   Site Assessment WDL except;Painful 20 1040   Line Status Infusing 20 1040   Phlebitis Scale 0-->no symptoms 20 1040   Infiltration Scale 0 20 1040   Dressing Intervention New dressing  20 1040   Number of days: 4        History reviewed. No pertinent past medical history.   History reviewed. No pertinent " surgical history.   No Known Allergies     Anesthesia Evaluation     .             ROS/MED HX    ENT/Pulmonary:     (+)MCKENZIE risk factors obese, , . .    Neurologic:  - neg neurologic ROS     Cardiovascular:     (+) hypertension----. : . . . :. .       METS/Exercise Tolerance:  4 - Raking leaves, gardening   Hematologic:     (+) Anemia, -      Musculoskeletal:  - neg musculoskeletal ROS       GI/Hepatic:     (+) GERD       Renal/Genitourinary:         Endo:         Psychiatric:  - neg psychiatric ROS       Infectious Disease:         Malignancy:         Other:                     JZG FV AN PHYSICAL EXAM    Assessment:   ASA SCORE: 3    H&P: History and physical reviewed and following examination; no interval change.         Plan:   Anes. Type:  Spinal; For Post-op pain in coordination with surgeon   Pre-Medication: None   Induction:  N/a   Airway: Native Airway   Access/Monitoring: PIV; 2nd PIV   Maintenance: N/a     Postop Plan:   Postop Pain: Regional  Postop Sedation/Airway: Not planned  Disposition: Inpatient/Admit     PONV Management: Adult Risk Factors: Female   Prevention: Ondansetron               (+) pre-eclampsia    Preeclampsia with severe features.       Yulissa Rust MD

## 2020-06-29 NOTE — PLAN OF CARE
Provider notification:  Provider Name: MD JANNETH Dumont MD. Notified at 1302 regarding a persistent category II fetal heart rate tracing for 30 minutes.   Baseline rate 140, normal  Variability minimal  Accelerations not present  Decelerations present, deceleration type: late , deceleration frequency: recurrent    EFM interpretation suggests concern for fetal metabolic acidemia at this time due to minimal variability, absence of accelerations.    Uterine Activity normal/regular.    Interventions to improve fetal oxygenation for a category II tracing include:maternal positioning, increase frequency of assessment and consult Category 2 algorithm    After discussion with provider: Provider to bedside, plan to proceed to  section now.

## 2020-06-29 NOTE — ANESTHESIA PROCEDURE NOTES
Combined Spinal/Epidural procedure note      Date/Time: 6/29/2020 2:57 PM  Staff -   Anesthesiologist:  Yulissa Rust MD  Resident/Fellow: Joanie Louise MD    Performed By: resident  Procedure performed by resident/CRNA in presence of a teaching physician.        Location:  OR  Timeout  patient identified, IV checked, site marked, risks and benefits discussed, informed consent, monitors and equipment checked, pre-op evaluation, at physician/surgeon's request and post-op pain management    Correct Patient: Yes    Correct Position: Yes    Correct Site: Yes    Correct Procedure: Yes    Correct Laterality:  N/A  Site Marked:  N/A    Procedure details  Procedure:  Combined Spinal/Epidural (CSE)  Position:  Sitting  ASA:  3 and Emergent  Sterile Prep: chloraprep    Local skin infiltration:  1% lidocaine  Approach:  Midline  Epidural Needle Type:  Perico  Needle gauge (G):  17  Needle length (in):  3.5  Injection Technique:  LORT saline  Attempts:  3  Redirects:  2  Spinal Needle (G):  25  Spinal Needle Type:  Shahnaz  Spinal Needle Length (in):  5  Paresthesias:  No  CSF with Spinal needle: Yes    Aspiration negative for Heme or CSF: Yes  6/29/2020 2:57 PM

## 2020-06-29 NOTE — PROGRESS NOTES
[facility] PROGRESS NOTE:   2020   9:23 PM         SUBJECTIVE:   Patient c/o right ear pain.    Contractions:  q 0 minutes  Leakage of fluid:  No  Vaginal bleeding:  No  Pain controlled:  No: still with headache, due for tylenol           OBJECTIVE:     Vitals:    20 0750 20 1150 20 1715 20 1947   BP:  139/83 (!) 153/87 132/77   Resp:   20    Temp:  98.7  F (37.1  C) 98.4  F (36.9  C) 98.9  F (37.2  C)   TempSrc:  Oral Oral Oral   SpO2:       Weight: 130.7 kg (288 lb 3.2 oz)      Height:             NST:  Fetal Heart Rate Tracing:   Baseline: 140  Variability: moderate  10x10 accels  Occ mild variable  Difficult to trace    Tocometer: No contractions    HEENT: TMs pearly bilaterally, no effusion or pus. Ear canals clear.  Abdomen:  Gravid, NT  Cervix:   Dilation: closed   Effacement: 50%   Station:-3   Consistency: firm   Position: Mid  Ext: +1 DTRs bilaterally         LABS:   No results found for this or any previous visit (from the past 12 hour(s)).           ASSESSMENT:   29 year old  at 29w2d   induction of labor, indication pre-eclampsia with severe features, intrauterine growth restriction, abnormal fetal dopplers.  No e/o cause for ear pain on exam.         PLAN:   Continue magnesium  Vaginal miso #3 placed      Estela Andrea MD

## 2020-06-29 NOTE — PROVIDER NOTIFICATION
06/29/20 0700   Uterine Activity Assessment   Method external tocotransducer   Contraction Frequency (Minutes) none   Contraction Intensity Patient reports cramping   Uterine Resting Tone soft by palpation   Fetal Assessment   Fetal HR Assessment Method external US   Fetal HR (beats/min) 140   Fetal Heart Baseline Rate normal range   Fetal HR Variability   (JENARO)   Fetal HR Accelerations   (JENARO)   Fetal HR Decelerations   (JENARO)     RN adjusted monitor frequently. Difficult to trace FHR. Will continue to attempt to adjust monitor.

## 2020-06-30 LAB
ALT SERPL W P-5'-P-CCNC: 8 U/L (ref 0–50)
AST SERPL W P-5'-P-CCNC: 12 U/L (ref 0–45)
CREAT SERPL-MCNC: 0.45 MG/DL (ref 0.52–1.04)
ERYTHROCYTE [DISTWIDTH] IN BLOOD BY AUTOMATED COUNT: 14.6 % (ref 10–15)
GFR SERPL CREATININE-BSD FRML MDRD: >90 ML/MIN/{1.73_M2}
HCT VFR BLD AUTO: 31.2 % (ref 35–47)
HGB BLD-MCNC: 10.4 G/DL (ref 11.7–15.7)
MCH RBC QN AUTO: 30.7 PG (ref 26.5–33)
MCHC RBC AUTO-ENTMCNC: 33.3 G/DL (ref 31.5–36.5)
MCV RBC AUTO: 92 FL (ref 78–100)
PLATELET # BLD AUTO: 189 10E9/L (ref 150–450)
RBC # BLD AUTO: 3.39 10E12/L (ref 3.8–5.2)
T PALLIDUM AB SER QL: NONREACTIVE
WBC # BLD AUTO: 12.3 10E9/L (ref 4–11)

## 2020-06-30 PROCEDURE — 84460 ALANINE AMINO (ALT) (SGPT): CPT | Performed by: STUDENT IN AN ORGANIZED HEALTH CARE EDUCATION/TRAINING PROGRAM

## 2020-06-30 PROCEDURE — 25000132 ZZH RX MED GY IP 250 OP 250 PS 637: Performed by: STUDENT IN AN ORGANIZED HEALTH CARE EDUCATION/TRAINING PROGRAM

## 2020-06-30 PROCEDURE — 25800030 ZZH RX IP 258 OP 636: Performed by: OBSTETRICS & GYNECOLOGY

## 2020-06-30 PROCEDURE — 85460 HEMOGLOBIN FETAL: CPT | Performed by: OBSTETRICS & GYNECOLOGY

## 2020-06-30 PROCEDURE — 86901 BLOOD TYPING SEROLOGIC RH(D): CPT | Performed by: OBSTETRICS & GYNECOLOGY

## 2020-06-30 PROCEDURE — 36415 COLL VENOUS BLD VENIPUNCTURE: CPT | Performed by: OBSTETRICS & GYNECOLOGY

## 2020-06-30 PROCEDURE — 82565 ASSAY OF CREATININE: CPT | Performed by: STUDENT IN AN ORGANIZED HEALTH CARE EDUCATION/TRAINING PROGRAM

## 2020-06-30 PROCEDURE — 85461 HEMOGLOBIN FETAL: CPT | Performed by: OBSTETRICS & GYNECOLOGY

## 2020-06-30 PROCEDURE — 36415 COLL VENOUS BLD VENIPUNCTURE: CPT | Performed by: STUDENT IN AN ORGANIZED HEALTH CARE EDUCATION/TRAINING PROGRAM

## 2020-06-30 PROCEDURE — 86900 BLOOD TYPING SEROLOGIC ABO: CPT | Performed by: OBSTETRICS & GYNECOLOGY

## 2020-06-30 PROCEDURE — 25000128 H RX IP 250 OP 636: Performed by: OBSTETRICS & GYNECOLOGY

## 2020-06-30 PROCEDURE — 84450 TRANSFERASE (AST) (SGOT): CPT | Performed by: STUDENT IN AN ORGANIZED HEALTH CARE EDUCATION/TRAINING PROGRAM

## 2020-06-30 PROCEDURE — 12000001 ZZH R&B MED SURG/OB UMMC

## 2020-06-30 PROCEDURE — 85027 COMPLETE CBC AUTOMATED: CPT | Performed by: STUDENT IN AN ORGANIZED HEALTH CARE EDUCATION/TRAINING PROGRAM

## 2020-06-30 PROCEDURE — 25000132 ZZH RX MED GY IP 250 OP 250 PS 637: Performed by: OBSTETRICS & GYNECOLOGY

## 2020-06-30 RX ORDER — OXYCODONE HYDROCHLORIDE 5 MG/1
5 TABLET ORAL
Status: COMPLETED | OUTPATIENT
Start: 2020-06-30 | End: 2020-06-30

## 2020-06-30 RX ORDER — DIPHENHYDRAMINE HYDROCHLORIDE 50 MG/ML
25 INJECTION INTRAMUSCULAR; INTRAVENOUS EVERY 6 HOURS PRN
Status: DISCONTINUED | OUTPATIENT
Start: 2020-06-30 | End: 2020-07-02 | Stop reason: HOSPADM

## 2020-06-30 RX ORDER — DIPHENHYDRAMINE HCL 25 MG
25 CAPSULE ORAL EVERY 6 HOURS PRN
Status: DISCONTINUED | OUTPATIENT
Start: 2020-06-30 | End: 2020-07-02 | Stop reason: HOSPADM

## 2020-06-30 RX ADMIN — SODIUM CHLORIDE, POTASSIUM CHLORIDE, SODIUM LACTATE AND CALCIUM CHLORIDE 75 ML/HR: 600; 310; 30; 20 INJECTION, SOLUTION INTRAVENOUS at 00:00

## 2020-06-30 RX ADMIN — OXYCODONE HYDROCHLORIDE 5 MG: 5 TABLET ORAL at 11:23

## 2020-06-30 RX ADMIN — HUMAN RHO(D) IMMUNE GLOBULIN 300 MCG: 300 INJECTION, SOLUTION INTRAMUSCULAR at 20:06

## 2020-06-30 RX ADMIN — SODIUM CHLORIDE, POTASSIUM CHLORIDE, SODIUM LACTATE AND CALCIUM CHLORIDE 75 ML/HR: 600; 310; 30; 20 INJECTION, SOLUTION INTRAVENOUS at 13:33

## 2020-06-30 RX ADMIN — OXYCODONE HYDROCHLORIDE 5 MG: 5 TABLET ORAL at 09:23

## 2020-06-30 RX ADMIN — OXYCODONE HYDROCHLORIDE 5 MG: 5 TABLET ORAL at 20:01

## 2020-06-30 RX ADMIN — DOCUSATE SODIUM AND SENNOSIDES 1 TABLET: 8.6; 5 TABLET, FILM COATED ORAL at 20:01

## 2020-06-30 RX ADMIN — KETOROLAC TROMETHAMINE 30 MG: 30 INJECTION, SOLUTION INTRAMUSCULAR at 12:03

## 2020-06-30 RX ADMIN — OXYCODONE HYDROCHLORIDE 5 MG: 5 TABLET ORAL at 15:45

## 2020-06-30 RX ADMIN — DOCUSATE SODIUM AND SENNOSIDES 2 TABLET: 8.6; 5 TABLET, FILM COATED ORAL at 09:16

## 2020-06-30 RX ADMIN — MAGNESIUM SULFATE IN WATER 2 G/HR: 40 INJECTION, SOLUTION INTRAVENOUS at 04:27

## 2020-06-30 RX ADMIN — NIFEDIPINE 30 MG: 30 TABLET, FILM COATED, EXTENDED RELEASE ORAL at 20:01

## 2020-06-30 RX ADMIN — KETOROLAC TROMETHAMINE 30 MG: 30 INJECTION, SOLUTION INTRAMUSCULAR at 06:07

## 2020-06-30 RX ADMIN — ACETAMINOPHEN 975 MG: 325 TABLET, FILM COATED ORAL at 12:02

## 2020-06-30 RX ADMIN — ACETAMINOPHEN 975 MG: 325 TABLET, FILM COATED ORAL at 18:15

## 2020-06-30 RX ADMIN — DIPHENHYDRAMINE HYDROCHLORIDE 25 MG: 25 CAPSULE ORAL at 02:03

## 2020-06-30 RX ADMIN — KETOROLAC TROMETHAMINE 30 MG: 30 INJECTION, SOLUTION INTRAMUSCULAR at 18:15

## 2020-06-30 RX ADMIN — ACETAMINOPHEN 975 MG: 325 TABLET, FILM COATED ORAL at 06:06

## 2020-06-30 ASSESSMENT — MIFFLIN-ST. JEOR: SCORE: 2108.38

## 2020-06-30 NOTE — PROGRESS NOTES
To NICU via wheelchair.  Pt received ordered oxycodone prior.  Pt c/o incisional pain when moving out of bed but then reported feeling better standing/sitting in wheelchair.  Pt had wanted to walk to the NICU.  Saw baby in NICU.  Pt transferred to [facility] via wheelchair.  Bedside report to HAMIDA Lino.  Sister, Malissa, is present in room.  NICU visitor rules discussed while in NICU.  Uncle is the other designated visitor.  Pt stable.  Ankle bracelet transferred to [facility] along with other belongings.  MRSA standard precautions.  Will need Rhogam.  Covid testing does not need to be redone as pt has not left the building per Dr Mckeon.  Seen by Dr Mckeon prior to discharge.  Call light is within reach.

## 2020-06-30 NOTE — PLAN OF CARE
Patient arrived to [facility] unit via wheelchair at 1050,with belongings, accompanied by family. Received report from Sia Guzman and checked bands. Unit and room orientation started. Call light within arms reach; yes concerns present at this time was pain. Continue with plan of care.

## 2020-06-30 NOTE — LACTATION NOTE
This note was copied from a baby's chart.  D:  I met with Shanda in her antepartum room today.  Real is her first baby.  She has a history of substance abuse, currently THC and tobacco use.  She otherwise takes no meds and has no history of breast/chest surgery or trauma.  She has already started to pump.    I:  I gave her a folder of introductory materials and went over pumping guidelines.    We talked about hands on pumping techniques, hand expression and how to access the [place] websites. I told her we could help her obtain a pump to use at discharge.  A:  Mom has initial information she needs to pump.  P:  Will continue to provide lactation support.      Viv Liang, RNC, IBCLC

## 2020-06-30 NOTE — PROGRESS NOTES
Postpartum Magnesium Check    S: Patient feeling well. No headache, vision changes, upper abdominal pain, chest pain or SOB.     O:   Patient Vitals for the past 24 hrs:   BP Temp Temp src Pulse Heart Rate Resp SpO2 Weight   20 194 (!) 169/104 -- -- 73 75 15 -- --   20 (!) 169/109 -- -- 69 73 14 100 % --   20 191 (!) 163/99 -- -- 65 75 14 100 % --   20 190 (!) 163/107 -- -- 76 69 14 100 % --   20 1857 (!) 160/98 -- -- 61 64 13 -- --   20 1845 (!) 162/96 -- -- 64 61 14 -- --   20 1835 (!) 159/99 -- -- 63 62 13 99 % --   20 1831 (!) 157/97 -- -- 61 60 14 -- --   20 1822 (!) 163/101 -- -- 59 59 15 -- --   20 1815 (!) 167/100 -- -- 61 57 15 -- --   20 1805 (!) 162/102 (P) 98.1  F (36.7  C) (P) Oral 59 64 18 100 % --   20 1735 (!) 154/98 -- -- 60 61 12 98 % --   20 1720 (!) 156/102 -- -- 60 79 16 98 % --   20 1705 (!) 140/88 -- -- 60 59 14 100 % --   20 1650 (!) 149/105 -- -- 62 63 17 100 % --   20 1635 (!) 145/92 98  F (36.7  C) Oral 58 61 15 97 % --   20 1620 136/87 -- -- -- -- 16 97 % --   20 1040 (!) 144/79 98.4  F (36.9  C) Oral -- -- 16 -- --   2055 -- -- -- -- -- -- -- 133.2 kg (293 lb 9.6 oz)   20 0905 134/79 98.6  F (37  C) Oral -- -- 18 -- --   20 0547 135/74 -- -- -- -- -- -- --   20 0411 (!) 159/84 98.8  F (37.1  C) Oral -- -- -- -- --   20 0010 136/82 98.7  F (37.1  C) Oral -- -- -- -- --     Gen: appears calm, NAD  Resp: CTAB  CV: RRR with no murmurs  Abdomen: soft, nontender  Ext: warm, well perfused, +1 edema, 1+ biceps reflexes. No clonus.     I/O last 3 completed shifts:  In: 4832.25 [P.O.:1560; I.V.:3272.25]  Out: 2900 [Urine:2900]    A/P: Shanda Manrique is a  female s/p PLTCS. Difficult to control BP in the postpartum period, requiring several doses of immediate acting antihypertensives. No symptoms of worsening preeclampsia or magnesium toxicity.  UOP adequate.   - Continue IV magnesium sulfate 2g/h   - Plan to discontinue IV Magnesium at 24h postpartum  - Continue q4h Mag checks; next at 0000  - D#1 Procardia 30 mg XL  - S/p 5, 10 IV hydralazine, 10 short acting nifedipine. Avoiding labetalol giving low heart rate    Katina Frankel MD  Obstetrics and Gyncology, PGY-1  June 29, 2020 , 8:01 PM

## 2020-06-30 NOTE — PLAN OF CARE
"VSS, afebrile. Patient has had no severe range pressures overnight, denies any pre-e symptoms. Itchiness was resolved from benadryl. Post-op pain has been well controlled with scheduled acetaminophen and Toradol, along with abdominal binder, hot packs and repositioning. Magnesium still infusing 2g continuously for pre-e, will turn off at 24 hours after delivery (approximately 1515 today). Patient denies any side effects from magnesium. Austin catheter still in place, output is adequate with clear yellow urine. Fundal checks have been U/3, firm without massage, with scant bleeding. Patient pumped once overnight, did not seem very interested. Nurse educated patient on importance of pumping to help get milk product in, order placed for lactation consultant to meet with patient. Patient also refused to get up this AM to ambulate in room and to obtain weight. Patient stated, \"it is too early and I am too tired to move\". Nurse educated patient on importance of ambulation and moving around, especially since patient has not got out of bed since post-op. Patient still refused and said that she would get up later in the morning. Plan is to transfer patient to postpartum this AM.   "

## 2020-06-30 NOTE — PLAN OF CARE
Vital signs stable and postpartum checks within normal limits. Pt is eating and drinking with no problem. Pumping  and getting nothing at this point, but getting drops with hand expression. The flor was discontinued at 1522 and magnesium sulfate at 1509. Pt was weight this afternoon. Pt complains of pain at incision site. Pt medicated with Toradol, Tylenol and Roxicodone for pain control. Encouraged pt to pump every 2 hours if awake and to empty her bladder every 2-3 hours. Continue cares.

## 2020-06-30 NOTE — PROGRESS NOTES
Postpartum Magnesium Check  Note delayed due to patient care    S:   Patient feeling well. No headache, vision changes, upper abdominal pain, chest pain or SOB.     O:   Patient Vitals for the past 24 hrs:   BP Temp Temp src Pulse Heart Rate Resp SpO2 Weight   06/30/20 0200 -- -- -- -- -- 20 100 % --   06/30/20 0100 -- -- -- -- -- 20 98 % --   06/30/20 0000 -- -- -- -- -- 20 98 % --   06/29/20 2345 117/79 98.2  F (36.8  C) Oral -- -- 20 100 % --   06/29/20 2247 127/71 98.5  F (36.9  C) Oral -- -- 22 99 % --   06/29/20 2205 (!) 140/84 -- -- 93 -- -- -- --   06/29/20 2155 135/82 -- -- 104 -- -- -- --   06/29/20 2135 (!) 142/92 -- -- 90 87 13 -- --   06/29/20 2120 (!) 145/101 -- -- 67 65 18 -- --   06/29/20 2105 (!) 143/111 -- -- 67 71 20 -- --   06/29/20 2100 (!) 128/90 -- -- 66 68 20 -- --   06/29/20 2055 (!) 151/97 -- -- 63 67 18 -- --   06/29/20 2050 (!) 153/97 -- -- 71 65 18 -- --   06/29/20 2045 (!) 144/101 -- -- 66 64 20 -- --   06/29/20 2040 (!) 150/95 -- -- 64 67 18 -- --   06/29/20 2035 (!) 168/113 -- -- -- 65 20 -- --   06/29/20 2030 (!) 171/104 -- -- 69 71 13 -- --   06/29/20 2028 (!) 146/96 -- -- 66 74 13 -- --   06/29/20 2020 (!) 172/117 -- -- 70 73 14 -- --   06/29/20 2015 (!) 169/107 -- -- 65 67 -- -- --   06/29/20 2000 (!) 167/113 -- -- 61 68 11 99 % --   06/29/20 1945 (!) 169/104 -- -- 73 75 15 -- --   06/29/20 1930 (!) 169/109 -- -- 69 73 14 100 % --   06/29/20 1915 (!) 163/99 -- -- 65 75 14 100 % --   06/29/20 1905 (!) 163/107 -- -- 76 69 14 100 % --   06/29/20 1857 (!) 160/98 -- -- 61 64 13 -- --   06/29/20 1845 (!) 162/96 -- -- 64 61 14 -- --   06/29/20 1835 (!) 159/99 -- -- 63 62 13 99 % --   06/29/20 1831 (!) 157/97 -- -- 61 60 14 -- --   06/29/20 1822 (!) 163/101 -- -- 59 59 15 -- --   06/29/20 1815 (!) 167/100 -- -- 61 57 15 -- --   06/29/20 1805 (!) 162/102 98.1  F (36.7  C) Oral 59 64 18 100 % --   06/29/20 1735 (!) 154/98 -- -- 60 61 12 98 % --   06/29/20 1720 (!) 156/102 -- -- 60 79 16 98  % --   20 1705 (!) 140/88 -- -- 60 59 14 100 % --   20 1650 (!) 149/105 -- -- 62 63 17 100 % --   20 1635 (!) 145/92 98  F (36.7  C) Oral 58 61 15 97 % --   20 1620 136/87 -- -- -- -- 16 97 % --   20 1040 (!) 144/79 98.4  F (36.9  C) Oral -- -- 16 -- --   20 0955 -- -- -- -- -- -- -- 133.2 kg (293 lb 9.6 oz)   20 0905 134/79 98.6  F (37  C) Oral -- -- 18 -- --   20 0547 135/74 -- -- -- -- -- -- --   20 0411 (!) 159/84 98.8  F (37.1  C) Oral -- -- -- -- --     Gen: Appears calm, NAD  Resp: CTAB  CV: RRR with no murmurs  Abdomen: Soft, nontender  Ext: Warm, well perfused, +1 edema, 1+ BR reflexes. No clonus.     UOP: 550 ml/hr  Weight: 293 lb     A/P:   Shanda Manrique is a  female s/p PLTCS for Cat II RFD after IOL for preE w/SF. Difficult to control BP in the postpartum period, requiring several doses of immediate acting antihypertensives, now improved. No symptoms of worsening preeclampsia or magnesium toxicity.   - Meds: S/p hydralazine 5, 10 mg, IR nifedipine 10 mg x2. D#1 procardia 30 mg XL.  - BPs: Most recently nl to MR  - Sx: Denies  - UOP: Adequate  - Continue IV magnesium sulfate 2g/h   - Plan to discontinue IV Magnesium at 24h postpartum  - Continue q4h Mag checks; next at 0500    Lemuel Landeros MD  OB/GYN Resident, PGY-3  2020 2:25 AM

## 2020-06-30 NOTE — PLAN OF CARE
Received shift change report from corky terrazas.  Pt is  29.3.  preE on magnesium sulfate 2 gm.  C/S on  at 1514.  Baby boy, Real Manrique, is in the NICU.  Verified settings.  Assessment completed.  Pt is comfortable w tylenol and toradol given at 0600.  Pt reports no headache, vision changes or epigastric pain.  Austin remains in place per order.  Q 2 hr I/O.  VSS.  +1 Reflexes.  No change from baseline.  Discussed plan for the day, pumping, seeing , getting up, transferring to [facility].  Pt states understanding of all.  Pt would like to go back to sleep at this time and pump/get up with assist at 0900.  Sister, Malissa, is present.  Call light is within reach.

## 2020-06-30 NOTE — PROVIDER NOTIFICATION
06/30/20 0900   Provider Notification   Provider Name/Title Dr Mckeon   Method of Notification Electronic Page   inquiring for transfer to [facility] as stable.

## 2020-06-30 NOTE — PROVIDER NOTIFICATION
06/30/20 1732   Provider Notification   Provider Name/Title Ok   Method of Notification Electronic Page   Notification Reason Other  (wants fresh air, no AMA needed)     FYI: pt stated that she never wanted to go outside to smoke. She wanted fresh air. She was offended that she was misunderstood. No need to talk about AMA now.

## 2020-06-30 NOTE — PROVIDER NOTIFICATION
06/29/20 5501   Provider Notification   Provider Name/Title Dr. Landeros   Method of Notification Phone   Notification Reason Other   Spoke with G3 provider about patient's post-op care. Plan is to monitor vitals q4hrs since patient's blood pressures are now stable, unless further indicated. Also keep flor catheter in place until Magnesium is discontinued 24 hours after delivery to monitor patient's output. Also, have patient ambulate around room as tolerated. Will continue to monitor closely and update provider with any changes.

## 2020-06-30 NOTE — CONSULTS
[facility]  MATERNAL CHILD HEALTH - SOCIAL WORK PROGRESS NOTE    LILIBETH received consult for NICU admission and pt's [employer] ankle bracelet being cut off for her surgery.  SW familiar with Shanda from antepartum admission, and full assessment completed on 6.26.2020; please refer to this documentation as needed.     Shanda delivered Real at 29w3d GA and he was admitted to the NICU on 6.29.2020.  SW spoke with Shanda today briefly for supportive follow-up after her delivery.  She had just notified her RN of being in pain, but otherwise stated she was doing okay and adjusting to early delivery.  Her sister, Malissa, was able to make it to hospital to be her support person and currently in room with her.  She was exhausted and hoping to rest, and made plan for SW to connect at a later date to further discuss NICU resources and assess needs.  She denied any immediate concerns, and was encouraged to utilize SW as needed.     SW spoke with [facility] ([phone]) to ensure they had been informed that Shadna's ankle bracelet was cut off for surgery.  They were appreciative of updates, understanding that Shanda remains admitted, and do not need further information at this time.  [facility] verified that pt does not need to be supervised in room while ankle bracelet is off, and they have SW direct contact information as needed for continuity of care.     As previously discussed with Shanda, SW completed verbal and written mandated CPS report with [facility] (Birgit - P: [phone], F: [phone]) d/t Shanda's toxicology screen positive for cannabinoids.  Umbilical sample results pending at this time.     SW will continue to follow throughout Real's NICU admission.     Beverly Enriquez, [facility]  Clinical   Maternal Child Health  Phone: [phone]  Pager: [phone]

## 2020-06-30 NOTE — PROGRESS NOTES
Post Partum Progress Note  Magnesium Check Note    POD#1, s/p PLTCS for Cat II FHT, RFD following IOL for preE w/SF in setting of IUGR w/abnormal UA dopplers    Subjective:  She is resting comfortably in bed this morning. Very sleepy. States pain well controlled on PO meds. Tolerating PO intake without nausea or vomiting. Austin catheter in place for accurate UOP. Has not ambulated since surgery with magnesium running. No flatus yet. Denies headache, changes in vision, chest pain, shortness of breath, RUQ pain, or worsening edema. Plans to breastfeed, though only pumped once overnight.    Objective:  Patient Vitals for the past 24 hrs:   BP Temp Temp src Pulse Heart Rate Resp SpO2 Weight   06/30/20 0500 -- -- -- -- -- 20 100 % --   06/30/20 0400 -- -- -- -- -- 20 100 % --   06/30/20 0345 105/65 98.3  F (36.8  C) Oral -- -- 20 93 % --   06/30/20 0300 -- -- -- -- -- 20 98 % --   06/30/20 0200 -- -- -- -- -- 20 100 % --   06/30/20 0100 -- -- -- -- -- 20 98 % --   06/30/20 0000 -- -- -- -- -- 20 98 % --   06/29/20 2345 117/79 98.2  F (36.8  C) Oral -- -- 20 100 % --   06/29/20 2247 127/71 98.5  F (36.9  C) Oral -- -- 22 99 % --   06/29/20 2205 (!) 140/84 -- -- 93 -- -- -- --   06/29/20 2155 135/82 -- -- 104 -- -- -- --   06/29/20 2135 (!) 142/92 -- -- 90 87 13 -- --   06/29/20 2120 (!) 145/101 -- -- 67 65 18 -- --   06/29/20 2105 (!) 143/111 -- -- 67 71 20 -- --   06/29/20 2100 (!) 128/90 -- -- 66 68 20 -- --   06/29/20 2055 (!) 151/97 -- -- 63 67 18 -- --   06/29/20 2050 (!) 153/97 -- -- 71 65 18 -- --   06/29/20 2045 (!) 144/101 -- -- 66 64 20 -- --   06/29/20 2040 (!) 150/95 -- -- 64 67 18 -- --   06/29/20 2035 (!) 168/113 -- -- -- 65 20 -- --   06/29/20 2030 (!) 171/104 -- -- 69 71 13 -- --   06/29/20 2028 (!) 146/96 -- -- 66 74 13 -- --   06/29/20 2020 (!) 172/117 -- -- 70 73 14 -- --   06/29/20 2015 (!) 169/107 -- -- 65 67 -- -- --   06/29/20 2000 (!) 167/113 -- -- 61 68 11 99 % --   06/29/20 1945 (!) 169/104 -- --  73 75 15 -- --   20 1930 (!) 169/109 -- -- 69 73 14 100 % --   20 191 (!) 163/99 -- -- 65 75 14 100 % --   20 1905 (!) 163/107 -- -- 76 69 14 100 % --   20 1857 (!) 160/98 -- -- 61 64 13 -- --   20 1845 (!) 162/96 -- -- 64 61 14 -- --   20 1835 (!) 159/99 -- -- 63 62 13 99 % --   20 1831 (!) 157/97 -- -- 61 60 14 -- --   20 1822 (!) 163/101 -- -- 59 59 15 -- --   20 1815 (!) 167/100 -- -- 61 57 15 -- --   20 1805 (!) 162/102 98.1  F (36.7  C) Oral 59 64 18 100 % --   20 1735 (!) 154/98 -- -- 60 61 12 98 % --   20 1720 (!) 156/102 -- -- 60 79 16 98 % --   20 1705 (!) 140/88 -- -- 60 59 14 100 % --   20 1650 (!) 149/105 -- -- 62 63 17 100 % --   20 1635 (!) 145/92 98  F (36.7  C) Oral 58 61 15 97 % --   20 1620 136/87 -- -- -- -- 16 97 % --   20 1040 (!) 144/79 98.4  F (36.9  C) Oral -- -- 16 -- --   20 0955 -- -- -- -- -- -- -- 133.2 kg (293 lb 9.6 oz)   20 0905 134/79 98.6  F (37  C) Oral -- -- 18 -- --     Gen: Appears calm, NAD  Resp: CTAB  CV: RRR with no murmurs  Abdomen: Soft, nontender, incision c/d/i with bandage in place.  Ext: Warm, well perfused, +1 edema, 1+ BR reflexes. No clonus.      UOP: most recently 775 ml/hr  Weight: 293 lb >AM weight pending    Labs/Imaging:   AM HELLP labs pending, previously wnl    Assessment/Plan:  Shanda Manrique is a 29 year old  female who is POD#1 s/p PLTCS for Cat II RFD following IOL for preeclampsia w/SF. Previously admitted to antepartum service for IUGR with abnormal UA dopplers. Pregnancy was otherwise complicated by h/o drug use, currently on house arrest with ankle bracelet, Rh negative status.      #Preeclampsia w/SF  - Meds: S/p hydralazine 5, 10 mg, IR nifedipine 10 mg x2. D#2 procardia 30 mg XL.  - BPs: Most recently nl   - Sx: Denies  - UOP: Adequate  - Continue IV magnesium sulfate 2g/h   - Plan to discontinue IV Magnesium at 24h  postpartum  - Continue q4h Mag checks; next at 1000    #House arrest  #H/o drug use, tobacco use  - Ankle bracelet removed for surgery, [facility] informed  - Social work consulted     #Postpartum cares  - Encourage routine post-operative goals including ambulation and incentive spirometry  - PNC: Rh negative, Rhogam ordered. Rubella immune.  - Pain: Controlled on oral medications  - Heme: Hgb 11.8> EBL 1450 (TXA)> AM pending.  If <10 will discharge home with iron.  - GI: Continue anti-emetics and stool softeners as needed.  - : Austin in place for accurate UOP monitoring.  - Infant: Stable in NICU  - Feeding: Plans on breastfeeding.  - BC: Declines currently    Discharge to home on likely POD#3    Lemuel Landeros MD  OB/GYN Resident, PGY-3  6/30/2020 6:08 AM     We discussed this patient in morning report and I saw this patient later in the day.  Agree with the above note.    Please see note from later this morning where I documented my exam from today.   Tatyana Euceda MD

## 2020-06-30 NOTE — PROVIDER NOTIFICATION
06/30/20 0157   Provider Notification   Provider Name/Title Dr. Landeros   Method of Notification Phone   Notification Reason Medication Request   Provider notified due to patient complaining of itchiness from side effect from spinal anesthesia. Provider is going to place order for benadryl.

## 2020-06-30 NOTE — PROGRESS NOTES
Magnesium Check Note    POD#1, s/p PLTCS for Cat II FHT, RFD following IOL for preE w/SF in setting of IUGR w/abnormal UA dopplers    Subjective:  Enjoyed breakfast, no nausea or vomiting. Austin still draining. Still has not ambulated, planning to now. No flatus yet, but denies feeling distended. Denies headache, changes in vision, chest pain, shortness of breath, RUQ pain, or worsening edema. Reports baby is doing well.    Objective:  Patient Vitals for the past 24 hrs:   BP Temp Temp src Pulse Heart Rate Resp SpO2 Weight   06/30/20 0745 129/69 98.3  F (36.8  C) Oral 78 -- 16 -- --   06/30/20 0600 -- -- -- -- -- 20 100 % --   06/30/20 0500 -- -- -- -- -- 20 100 % --   06/30/20 0400 -- -- -- -- -- 20 100 % --   06/30/20 0345 105/65 98.3  F (36.8  C) Oral -- -- 20 93 % --   06/30/20 0300 -- -- -- -- -- 20 98 % --   06/30/20 0200 -- -- -- -- -- 20 100 % --   06/30/20 0100 -- -- -- -- -- 20 98 % --   06/30/20 0000 -- -- -- -- -- 20 98 % --   06/29/20 2345 117/79 98.2  F (36.8  C) Oral -- -- 20 100 % --   06/29/20 2247 127/71 98.5  F (36.9  C) Oral -- -- 22 99 % --   06/29/20 2205 (!) 140/84 -- -- 93 -- -- -- --   06/29/20 2155 135/82 -- -- 104 -- -- -- --   06/29/20 2135 (!) 142/92 -- -- 90 87 13 -- --   06/29/20 2120 (!) 145/101 -- -- 67 65 18 -- --   06/29/20 2105 (!) 143/111 -- -- 67 71 20 -- --   06/29/20 2100 (!) 128/90 -- -- 66 68 20 -- --   06/29/20 2055 (!) 151/97 -- -- 63 67 18 -- --   06/29/20 2050 (!) 153/97 -- -- 71 65 18 -- --   06/29/20 2045 (!) 144/101 -- -- 66 64 20 -- --   06/29/20 2040 (!) 150/95 -- -- 64 67 18 -- --   06/29/20 2035 (!) 168/113 -- -- -- 65 20 -- --   06/29/20 2030 (!) 171/104 -- -- 69 71 13 -- --   06/29/20 2028 (!) 146/96 -- -- 66 74 13 -- --   06/29/20 2020 (!) 172/117 -- -- 70 73 14 -- --   06/29/20 2015 (!) 169/107 -- -- 65 67 -- -- --   06/29/20 2000 (!) 167/113 -- -- 61 68 11 99 % --   06/29/20 1945 (!) 169/104 -- -- 73 75 15 -- --   06/29/20 1930 (!) 169/109 -- -- 69 73 14 100  % --   06/29/20 1915 (!) 163/99 -- -- 65 75 14 100 % --   06/29/20 1905 (!) 163/107 -- -- 76 69 14 100 % --   06/29/20 1857 (!) 160/98 -- -- 61 64 13 -- --   06/29/20 1845 (!) 162/96 -- -- 64 61 14 -- --   06/29/20 1835 (!) 159/99 -- -- 63 62 13 99 % --   06/29/20 1831 (!) 157/97 -- -- 61 60 14 -- --   06/29/20 1822 (!) 163/101 -- -- 59 59 15 -- --   06/29/20 1815 (!) 167/100 -- -- 61 57 15 -- --   06/29/20 1805 (!) 162/102 98.1  F (36.7  C) Oral 59 64 18 100 % --   06/29/20 1735 (!) 154/98 -- -- 60 61 12 98 % --   06/29/20 1720 (!) 156/102 -- -- 60 79 16 98 % --   06/29/20 1705 (!) 140/88 -- -- 60 59 14 100 % --   06/29/20 1650 (!) 149/105 -- -- 62 63 17 100 % --   06/29/20 1635 (!) 145/92 98  F (36.7  C) Oral 58 61 15 97 % --   06/29/20 1620 136/87 -- -- -- -- 16 97 % --   06/29/20 1040 (!) 144/79 98.4  F (36.9  C) Oral -- -- 16 -- --   06/29/20 0955 -- -- -- -- -- -- -- 133.2 kg (293 lb 9.6 oz)   06/29/20 0905 134/79 98.6  F (37  C) Oral -- -- 18 -- --     Gen: Appears calm, NAD  Resp: CTAB  CV: RRR with no murmurs  Abdomen: Soft, nontender, incision c/d/i with bandage in place.  Ext: Warm, well perfused, +1 edema, 1+ BR reflexes. No clonus.      UOP: 3850 ml yesterday // 3025 ml since midnight  Weight: 293 lb 6/29>AM pending    Labs:   Results for JOSE ANTONIO PICHARDO (MRN [id number]) as of 6/30/2020 09:22   Ref. Range 6/30/2020 06:56   Creatinine Latest Ref Range: 0.52 - 1.04 mg/dL 0.45 (L)   GFR Estimate Latest Ref Range: >60 mL/min/1.73_m2 >90   GFR Estimate If Black Latest Ref Range: >60 mL/min/1.73_m2 >90   ALT Latest Ref Range: 0 - 50 U/L 8   AST Latest Ref Range: 0 - 45 U/L 12   WBC Latest Ref Range: 4.0 - 11.0 10e9/L 12.3 (H)   Hemoglobin Latest Ref Range: 11.7 - 15.7 g/dL 10.4 (L)   Hematocrit Latest Ref Range: 35.0 - 47.0 % 31.2 (L)   Platelet Count Latest Ref Range: 150 - 450 10e9/L 189   RBC Count Latest Ref Range: 3.8 - 5.2 10e12/L 3.39 (L)   MCV Latest Ref Range: 78 - 100 fl 92   MCH Latest Ref Range:  26.5 - 33.0 pg 30.7   MCHC Latest Ref Range: 31.5 - 36.5 g/dL 33.3   RDW Latest Ref Range: 10.0 - 15.0 % 14.6       Assessment/Plan:  Shanda Manrique is a 29 year old  female who is POD#1 s/p PLTCS for Cat II RFD following IOL for preeclampsia w/SF. Previously admitted to antepartum service for IUGR with abnormal UA dopplers. Pregnancy was otherwise complicated by h/o drug use, currently on house arrest with ankle bracelet, Rh negative status.      #Preeclampsia w/SF  - Meds: S/p hydralazine 5, 10 mg, IR nifedipine 10 mg x2. D#2 procardia 30 mg XL.  - BPs: Most recently nl   - Sx: Denies  - UOP: Adequate  - Continue IV magnesium sulfate 2g/h   - Plan to discontinue IV Magnesium at 24h postpartum  - Continue q4h Mag checks; next at 1300    Christiano Mckeon MD, MPH  OBGYN PGY-3  2020 9:23 AM     Physician Attestation   I, Tatyana Euceda MD, personally examined and evaluated this patient.  I discussed the patient with the resident/fellow and care team, and agree with the assessment and plan of care as documented in the note of 20.      I personally reviewed vital signs, medications and labs.  Hemoglobin   Date Value Ref Range Status   2020 10.4 (L) 11.7 - 15.7 g/dL Final   2020 11.8 11.7 - 15.7 g/dL Final      Vitals:    20 0743 20 0750 20 0955 20 1527   Weight: 128 kg (282 lb 3 oz) 130.7 kg (288 lb 3.2 oz) 133.2 kg (293 lb 9.6 oz) 130.9 kg (288 lb 9.3 oz)     Key findings: patient stable on mag.  Feeling tired as expected. BP's have come down since yesterday.  She is on oral nifedipine. Mag will come off today around 1515.  discussed plan for discharge likely POD #3 as long as BP's remain stable.    Tatyana Euceda MD  Date of Service (when I saw the patient): 20

## 2020-06-30 NOTE — PLAN OF CARE
Patient wanted an update on her son's current status. Patient spoke on the phone with son's NICU nurse. Patient seemed relieved and stated that she would like to see him in the morning. Nurse offered patient to see patient overnight, patient stated she was exhausted and would like to get some rest.

## 2020-07-01 LAB
CANNABINOIDS UR CFM-MCNC: 186 NG/ML
CANNABINOIDS UR CFM-MCNC: 362 NG/ML
CARBOXYTHC/CREAT UR: 122 NG/MG{CREAT}
CARBOXYTHC/CREAT UR: 329 NG/MG{CREAT}

## 2020-07-01 PROCEDURE — 12000001 ZZH R&B MED SURG/OB UMMC

## 2020-07-01 PROCEDURE — 25000132 ZZH RX MED GY IP 250 OP 250 PS 637: Performed by: STUDENT IN AN ORGANIZED HEALTH CARE EDUCATION/TRAINING PROGRAM

## 2020-07-01 PROCEDURE — 25000132 ZZH RX MED GY IP 250 OP 250 PS 637: Performed by: OBSTETRICS & GYNECOLOGY

## 2020-07-01 RX ORDER — AMOXICILLIN 250 MG
1 CAPSULE ORAL DAILY
Qty: 100 TABLET | Refills: 0 | Status: SHIPPED | OUTPATIENT
Start: 2020-07-01 | End: 2020-12-23

## 2020-07-01 RX ORDER — NIFEDIPINE 30 MG
30 TABLET, EXTENDED RELEASE ORAL EVERY 24 HOURS
Qty: 45 TABLET | Refills: 0 | Status: SHIPPED | OUTPATIENT
Start: 2020-07-01 | End: 2020-12-23

## 2020-07-01 RX ORDER — ACETAMINOPHEN 325 MG/1
650 TABLET ORAL EVERY 6 HOURS PRN
Qty: 100 TABLET | Refills: 0 | Status: SHIPPED | OUTPATIENT
Start: 2020-07-01 | End: 2020-12-23

## 2020-07-01 RX ORDER — IBUPROFEN 600 MG/1
600 TABLET, FILM COATED ORAL EVERY 6 HOURS PRN
Qty: 60 TABLET | Refills: 0 | Status: SHIPPED | OUTPATIENT
Start: 2020-07-01 | End: 2020-12-23

## 2020-07-01 RX ADMIN — IBUPROFEN 800 MG: 800 TABLET, FILM COATED ORAL at 00:13

## 2020-07-01 RX ADMIN — IBUPROFEN 800 MG: 800 TABLET, FILM COATED ORAL at 12:25

## 2020-07-01 RX ADMIN — IBUPROFEN 800 MG: 800 TABLET, FILM COATED ORAL at 19:58

## 2020-07-01 RX ADMIN — IBUPROFEN 800 MG: 800 TABLET, FILM COATED ORAL at 06:22

## 2020-07-01 RX ADMIN — OXYCODONE HYDROCHLORIDE 5 MG: 5 TABLET ORAL at 05:07

## 2020-07-01 RX ADMIN — DOCUSATE SODIUM AND SENNOSIDES 2 TABLET: 8.6; 5 TABLET, FILM COATED ORAL at 19:58

## 2020-07-01 RX ADMIN — OXYCODONE HYDROCHLORIDE 5 MG: 5 TABLET ORAL at 09:09

## 2020-07-01 RX ADMIN — ACETAMINOPHEN 975 MG: 325 TABLET, FILM COATED ORAL at 06:21

## 2020-07-01 RX ADMIN — OXYCODONE HYDROCHLORIDE 5 MG: 5 TABLET ORAL at 19:58

## 2020-07-01 RX ADMIN — ACETAMINOPHEN 975 MG: 325 TABLET, FILM COATED ORAL at 12:26

## 2020-07-01 RX ADMIN — ACETAMINOPHEN 975 MG: 325 TABLET, FILM COATED ORAL at 19:58

## 2020-07-01 RX ADMIN — DOCUSATE SODIUM AND SENNOSIDES 2 TABLET: 8.6; 5 TABLET, FILM COATED ORAL at 09:26

## 2020-07-01 RX ADMIN — NIFEDIPINE 30 MG: 30 TABLET, FILM COATED, EXTENDED RELEASE ORAL at 19:58

## 2020-07-01 RX ADMIN — OXYCODONE HYDROCHLORIDE 5 MG: 5 TABLET ORAL at 00:14

## 2020-07-01 RX ADMIN — OXYCODONE HYDROCHLORIDE 5 MG: 5 TABLET ORAL at 13:34

## 2020-07-01 RX ADMIN — ACETAMINOPHEN 975 MG: 325 TABLET, FILM COATED ORAL at 00:14

## 2020-07-01 ASSESSMENT — MIFFLIN-ST. JEOR: SCORE: 2100.29

## 2020-07-01 NOTE — PLAN OF CARE
VSS and postpartum assessments WDL.  Up ad kassy with steady gait.  Independent with cares. Infant in NICU; verbalizing appropriate maternal bonding regarding infant. Pumping independently, but not getting anything yet. Pain managed with tylenol, ibuprofen and oxycodone. PIV in left hand. No support people present with her.  Will continue with postpartum cares and education per plan of care.

## 2020-07-01 NOTE — PROGRESS NOTES
Post Partum Progress Note    POD#2, s/p PLTCS for Cat II FHT, RFD following IOL for preE w/SF in setting of IUGR w/abnormal UA dopplers    Subjective:  She is resting comfortably in bed this morning. Pain well controlled on PO meds. Tolerating PO intake without nausea or vomiting. Voiding spontaneously. Minimal lochia. Passing gas, no BM. Has only been ambulating to the bathroom. Denies headache, changes in vision, chest pain, shortness of breath, RUQ pain, or worsening edema. Working on pumping. It is her [age]!    Objective:  Patient Vitals for the past 24 hrs:   BP Temp Temp src Pulse Heart Rate Resp SpO2 Weight   20 0510 120/79 98.3  F (36.8  C) Oral -- 80 16 -- --   20 2335 122/78 98.6  F (37  C) Oral -- 81 18 -- --   20 1912 136/82 98.6  F (37  C) Oral -- -- 21 -- --   20 1527 -- -- -- -- -- -- -- 130.9 kg (288 lb 9.3 oz)   20 1511 138/86 98.2  F (36.8  C) Oral 82 -- 20 100 % --   20 1326 -- -- -- -- -- 20 100 % --   20 1203 -- -- -- -- -- 18 98 % --   20 1130 (!) 106/91 98.4  F (36.9  C) Oral 88 -- 20 100 % --   20 0925 -- -- -- -- -- -- 99 % --   20 0745 129/69 98.3  F (36.8  C) Oral 78 -- 16 -- --     Gen: Appears calm, NAD  Resp: CTAB  CV: RRR with no murmurs  Abdomen: Soft, nontender, incision c/d/i with steri strips in place.  Ext: Warm, well perfused, +1 edema, 1+ BR reflexes.      UOP: 1259 ml since 0000  Weight: 133 kg >130 kg     Labs/Imaging:   Normal HELLP labs, most recent     Assessment/Plan:  Shanda Manrique is a 30 year old  female who is POD#2 s/p PLTCS for Cat II RFD following IOL for preeclampsia w/SF. Previously admitted to antepartum service for IUGR with abnormal UA dopplers. Pregnancy was otherwise complicated by h/o drug use, currently on house arrest with ankle bracelet, Rh negative status.      #Preeclampsia w/SF  - Meds: S/p hydralazine 5, 10 mg, IR nifedipine 10 mg x2. D#3 procardia 30 mg XL.  - S/p 24  hours magnesium  - BPs: Most recently nl  - UOP: Adequate, strict I/O  - Daily weights    #House arrest  #H/o drug use, tobacco use  - Ankle bracelet removed for surgery, [facility] informed  - Social work consulted, following    #Postpartum cares  - Encourage routine post-operative goals including ambulation and incentive spirometry  - PNC: Rh negative, Rhogam ordered. Rubella immune.  - Pain: Controlled on oral medications  - Heme: Hgb 11.8> EBL 1450 (TXA)> 10.4.  - GI: Continue anti-emetics and stool softeners as needed.  - : Voiding spontaneously.  - Infant: Stable in NICU.  - Feeding: Plans on breastfeeding.  - BC: Declines currently    Discharge to home on likely POD#3    Lemuel Landeros MD  OB/GYN Resident, PGY-3  7/1/2020 6:12 AM     Attestation:   This patient was seen and evaluated by me, separately from the house staff team. I have reviewed the note/plan above and agree.     Doing well and pumping for baby in NICU. Pain controlled and BP's are good on the procardia. Will discontinue IV and plan home tomorrow.    Medina Crump MD

## 2020-07-01 NOTE — PROGRESS NOTES
07/01/20 1346   Values Beliefs and Spiritual Care   C: Community: In support of your spiritual health, is there someone we may contact for you? (identify all that apply)   (shs/7.1)   Visit Information   Visit Made By Staff    Type of Visit Initial   Visited Patient   Interventions   Basic Spiritual Interventions   Reflective conversation; introduction/orientation to Spiritual Health Services     SPIRITUAL HEALTH SERVICES  SPIRITUAL ASSESSMENT Progress Note  [facility] NICU     REFERRAL SOURCE:  initiated.    I introduced SHS to Shanda. I assessed for spiritual health needs.  Shanda is Mandaeism and has found prayer helpful.  She is encouraged by updates about Amar.  She has friends and family support.  She is looking forward to going to the NICU later this afternoon.  She did not identify any SHS needs at this time but is open to follow-up support.      PLAN: [facility] remains available for the duration of patient's hospitalization.     Jessica Abreu MDiv.    Pager [phone]

## 2020-07-01 NOTE — PROGRESS NOTES
"Post  Anesthesia Follow Up Note    Patient: Shanda Manrique    Patient location: Postpartum floor.    Chief complaint: Acute postoperative pain management s/p intrathecal morphine administration     Procedure(s) Performed:  Procedure(s):   SECTION    Anesthesia type: Spinal Block and Epidural    Subjective:     Pain Control: 3-4/10 at rest and 5-6/10 with ambulation    Additional ROS:  She does not complain of pruritis at this time. She denies weakness, denies paresthesia, denies difficulties breathing or voiding, denies nausea or vomiting. She is able to ambulate and tolerates regular diet.    Objective:    Respiratory Function (RR / SpO2 / Airway Patency): Satisfactory    Cardiac Function (HR / Rhythm / BP): Satisfactory    Site of spinal/epidural insertion: No signs of infection or inflammation.     Last Vitals: BP (!) 151/83 (BP Location: Left arm)   Pulse 89   Temp 36.9  C (98.5  F) (Oral)   Resp 18   Ht 1.753 m (5' 9\")   Wt 130.6 kg (287 lb 14.4 oz)   LMP 10/30/2019   SpO2 100%   Breastfeeding Unknown   BMI 42.52 kg/m      Assessment and plan:   Shanda Manrique is a 30 year old female  POD #2 s/p   with IT bupivacaine (1.7 ml), fentanyl (10 mcg) and morphine (100 mcg); and single shot TAP nerve block injections with 20 mL bupivacaine 0.25% and 20mL liposome bupivacaine (Exparel) long-acting 1.3% given for postoperative analgesia.  Pt is ambulating without difficulty, no weakness or paresthesias.  There is no evidence of adverse side effects associated with spinal and nerve block injections.  The patient is receiving adequate incisional pain control at this time and anticipate up to 72 hours of incisional pain control.  However, we further anticipate that the patient will require opioid/nonopioid analgesics for visceral and muscle pain that is not controlled with local anesthetic.      In brief summary, her post-operative analgesia is adequate today. Further " interventional analgesic strategies would be of little utility at this time. Thus, we recommend proceeding with PO analgesics including staggered dosing of NSAIDs (ibuprofen) and acetaminophen, with a taper of oxycodone.     Thank you for including us in the care for this patient.    Joanie MCHUGH  Anesthesia Resident, PGY3

## 2020-07-01 NOTE — PLAN OF CARE
Pt has one elevated blood pressure of 142/86, but postpartum checks are within normal limits. Pt is up voiding, ambulating in the room and visiting baby in NICU. Pumping independently. Incision is in tact and there is inter-dry cloth in place. Pt instructed to air the incision area 2-3 times a day. Pain is managed with Ibuprofen, Tylenol and Roxicodone. Continue cares.

## 2020-07-01 NOTE — PLAN OF CARE
VSS. Postpartum assessment  WNL- see flow sheet. Scant lochia rubra. Fundus firm, midline at U/3. Incision is open to air, steri strips are clean, dry and intact. Interdry placed on incision. Pain managed on tylenol, ibuprofen and oxycodone. Patient up ad kassy, voiding spontaneously, ambulating and performing self cares independently. Fluids and ambulation promoted. Patient breastpumping. Visited baby in NICU overnight. Will continue to monitor and provide support as needed.

## 2020-07-01 NOTE — PROGRESS NOTES
"[facility]  MATERNAL CHILD HEALTH - SOCIAL WORK PROGRESS NOTE    SW met with Shanda at Real's bedside.  She was holding him throughout visit, and seemed elated.  She reflected on the amado of holding him and relief that he is currently stable.      Shanda explained that she officially designated her brother (who she currently resides with), as pt's other preferred visitor throughout this admission.  She is preparing for her discharge later this week and what it will be like to go back/forth to visit.  She acknowledges knowing that she will need to go home, but is anticipating that this will be challenging for her to be away.  SW provided monthly parking pass utilizing pt assistance fund to help ease the financial burden of frequent visiting.    SW again assessed Shanda's current mood and needs, and she denies any concerns.  SW discussed PMADs and provided information on PPSM to be used as a resources as needed.    Later in afternoon, LILIBETH received phone call from [facility] CPS Worker, [place] ([phone]) who reported that she has made contact with mom, and will meet with her after her discharge from the hospital.  D/t current COVID visitor restrictions, she will not come to hospital to \"lay eyes\" on baby.    SW will continue to follow throughout Real's NICU admission.    Beverly Enriquez, [facility]  Clinical   Maternal Child Health  Phone: [phone]  Pager: [phone]  "

## 2020-07-02 VITALS
OXYGEN SATURATION: 100 % | TEMPERATURE: 98.2 F | WEIGHT: 282.5 LBS | HEIGHT: 69 IN | HEART RATE: 82 BPM | SYSTOLIC BLOOD PRESSURE: 118 MMHG | BODY MASS INDEX: 41.84 KG/M2 | RESPIRATION RATE: 18 BRPM | DIASTOLIC BLOOD PRESSURE: 82 MMHG

## 2020-07-02 PROCEDURE — 25000132 ZZH RX MED GY IP 250 OP 250 PS 637: Performed by: OBSTETRICS & GYNECOLOGY

## 2020-07-02 RX ORDER — OXYCODONE HYDROCHLORIDE 5 MG/1
5 TABLET ORAL EVERY 4 HOURS PRN
Qty: 12 TABLET | Refills: 0 | Status: SHIPPED | OUTPATIENT
Start: 2020-07-02 | End: 2020-12-23

## 2020-07-02 RX ORDER — OXYCODONE HYDROCHLORIDE 5 MG/1
5 TABLET ORAL EVERY 6 HOURS PRN
Qty: 15 TABLET | Refills: 0 | Status: SHIPPED | OUTPATIENT
Start: 2020-07-02 | End: 2020-07-05

## 2020-07-02 RX ADMIN — ACETAMINOPHEN 975 MG: 325 TABLET, FILM COATED ORAL at 01:59

## 2020-07-02 RX ADMIN — ACETAMINOPHEN 975 MG: 325 TABLET, FILM COATED ORAL at 09:09

## 2020-07-02 RX ADMIN — SIMETHICONE 80 MG: 80 TABLET, CHEWABLE ORAL at 09:09

## 2020-07-02 RX ADMIN — OXYCODONE HYDROCHLORIDE 5 MG: 5 TABLET ORAL at 00:27

## 2020-07-02 RX ADMIN — OXYCODONE HYDROCHLORIDE 5 MG: 5 TABLET ORAL at 12:40

## 2020-07-02 RX ADMIN — DOCUSATE SODIUM AND SENNOSIDES 2 TABLET: 8.6; 5 TABLET, FILM COATED ORAL at 09:09

## 2020-07-02 RX ADMIN — IBUPROFEN 800 MG: 800 TABLET, FILM COATED ORAL at 09:09

## 2020-07-02 RX ADMIN — IBUPROFEN 800 MG: 800 TABLET, FILM COATED ORAL at 01:56

## 2020-07-02 RX ADMIN — OXYCODONE HYDROCHLORIDE 5 MG: 5 TABLET ORAL at 06:35

## 2020-07-02 RX ADMIN — SIMETHICONE 80 MG: 80 TABLET, CHEWABLE ORAL at 00:27

## 2020-07-02 ASSESSMENT — MIFFLIN-ST. JEOR: SCORE: 2075.79

## 2020-07-02 NOTE — PLAN OF CARE
"VSS and postpartum assessments WDL.  Up ad kassy with steady gait.  Independent with cares.  Bonding well with infant, who is in the NICU. Pt has a strong goal to breastfeed, but she states she did \"a bad job\" pumping today, as she reported not having pumped since 9am. Pt was educated and encouraged to pump every 4 hours through the night so she can rest, and then aim to pump q2-3hours during the day, as she is able. (these goals were adjusted so that she would feel they were more attainable than [id number] through the night).  Pain managed with tylenol, ibuprofen and oxy (but not as often as it is available; doses were rescheduled when she was having an extended visit to baby in the NICU, so pain got ahead of her and we had to do some catching up to try to manage it effectively again.)    Of note, pt stated anxiety about (and was tearful about) being discharged tomorrow due to concerns that her house arrest status may possibly affect her ability to visit her baby in the NICU. Empathetic listening was provided, and patient was encouraged to utilize social work and care team as well as her legal team to gain insight about her options for infant visits.    Will continue with postpartum cares and education per plan of care.   "

## 2020-07-02 NOTE — PLAN OF CARE
Patient's vss, postpartum assessment is wdl. Neuro reflexes WDL. Patient denies any signs and symptoms of preeclampsia. Patient has orders for discharge. Discharge instructions reviewed with patient. Patient verbalized that she already has a follow up appointment  for blood pressure check on 7/9. Patient also is aware of follow up in 6 weeks. Discharge medications and instructions reviewed with patient. Patient is stable. Discharged to home.

## 2020-07-02 NOTE — PLAN OF CARE
Shanda has had only one severe range blood pressure at the beginning of my shift and it came down with 15 minute check afterwards. Pt has no complaint of headache,blurred vision or right upper quadrant pain. Pt has had good output tonight. Shanda is pumping for her baby in the NICU and getting 5 mls. Shanda went down to see her baby in the NICU and is very determined to do a great job raising baby. Shanda is concerned about discharge and having to leave baby in hospital.  aware of Pt and will follow up today.

## 2020-07-02 NOTE — PROGRESS NOTES
"S; Feeling good, tired.  Pain well controlled with po meds.  Tolerating a general diet.  +flatus.  Ambulating without difficulty    O: /84   Pulse 75   Temp 98.1  F (36.7  C) (Oral)   Resp 20   Ht 1.753 m (5' 9\")   Wt 128.1 kg (282 lb 8 oz)   LMP 10/30/2019   SpO2 100%   Breastfeeding Unknown   BMI 41.72 kg/m         Abd: SNT, mildly distended.  Appropriately TTP  Wound: incision C/D/I  Ex: no calf tenderness    Hgb: 10.4    A/P;  1) POD #3 S/P LTCS    Doing well, ok for discharge.  Has to discuss outpt rides, etc with NICU SW since she is on house arrest. discharge instructions given and rx faxed.  rtc 2  Weeks for BP check.     JAMAR SAUNDERS MD    "

## 2020-07-02 NOTE — PROGRESS NOTES
[facility]  MATERNAL CHILD HEALTH - SOCIAL WORK PROGRESS NOTE    LILIBETH notified by bedside RN that pt is planned to discharge today.  LILIBETH spoke with pt and encouraged her to contact [facility] Home Monitoring ([phone]) to coordinate plan for getting new bracelet and instructions for when she visits NICU.  Shanda verified understanding and expressed intent to call shortly.    SW provided Shanda with information on transportation assistance through her [employer], and provided her with number to discuss her eligibility/schedule rides ([phone]).  LILIBETH verified that today, Shanda's brother intends to pick her up from hospital.    LILIBETH also spoke with  [facility] Representative as instructed to confirm that pt discharging from hospital today, and ensure understanding that Real remains in hospital and therefore Shanda will be visiting him.  Representative did not anticipate any issues with Shanda's ability to visit, and verified that it is correct for Shanda to also call [facility] for instructions on when she visits.  LILIBETH faxed written confirmation of Shanda's discharge today to [facility] (F: [phone]).    Shanda denied further questions from LILIBETH today, and was encouraged to reach out as needed during.  LILIBETH will continue to follow pt/family during Real's NICU admission.     Beverly Enriquez [facility]  Clinical   Maternal Child Health  Phone: [phone]  Pager: [phone]

## 2020-07-07 LAB — COPATH REPORT: NORMAL

## 2020-07-10 ENCOUNTER — OFFICE VISIT (OUTPATIENT)
Dept: MIDWIFE SERVICES | Facility: CLINIC | Age: 30
End: 2020-07-10
Payer: COMMERCIAL

## 2020-07-10 VITALS
WEIGHT: 273.1 LBS | BODY MASS INDEX: 41.39 KG/M2 | DIASTOLIC BLOOD PRESSURE: 86 MMHG | TEMPERATURE: 98.7 F | HEIGHT: 68 IN | SYSTOLIC BLOOD PRESSURE: 130 MMHG

## 2020-07-10 PROCEDURE — 99212 OFFICE O/P EST SF 10 MIN: CPT | Mod: 24 | Performed by: ADVANCED PRACTICE MIDWIFE

## 2020-07-10 ASSESSMENT — MIFFLIN-ST. JEOR: SCORE: 2017.27

## 2020-07-10 NOTE — LETTER
[place] [id number] [place] [address]  [facility] [address]  [phone]      July 10, 2020      Shanda Manrique  [address] [place] [address]              To Whom It May Concern:    Shanda Manriqeu is being seen in our clinic for prenatal care. She was seen at our clinic today for a visit for postpartum follow up. She was originally scheduled yesterday but this appointment was rescheduled for today. Please accept this letter as proof that she was seen by her provider today.     Sincerely,      Lorene Ellis CNM

## 2020-07-10 NOTE — PROGRESS NOTES
"Shanda Manrique presents to the clinic for a blood pressure check. She is 9 days postpartum after a   delivery complicated by preeclampsia with severe features. She is taking procardia. She feels well aside from some abdominal pain with movement and engagement of her abdominal muscles. Her bleeding tapered and stopped for 2 days but now resumed. She denies fever or signs of infection. Her baby is doing well in the NICU and exceeding expectations. She is teary and reports some stress but doesn't want to elaborate. Requests a letter stating she came to her appointment.     O: /86   Temp 98.7  F (37.1  C) (Oral)   Ht 1.727 m (5' 8\")   Wt 123.9 kg (273 lb 1.6 oz)   Breastfeeding Yes   BMI 41.52 kg/m      Incision: healing well. No signs of infection.       A: S/p  delivery complicated by preeclampsia with severe features. BP at goal with procardia    P: Plan to come at 6 wk for a postpartum visit. Recommend to stop procardia a couple days prior to appointment to see if it can be stopped. Call with questions or concerns.    Lorene Ellis, CUONG, APRN CNM    "

## 2020-07-20 NOTE — PROGRESS NOTES
Subjective     Shanda Manrique is a 30 year old female who presents to clinic today for the following health issues:    HPI       Derm Problem      Duration: 2020 until present day    Description: bumps all over body, itchy, red.   Location: Everywhere   Itching: mild    Intensity:  mild    Accompanying signs and symptoms: None    History (similar episodes/previous evaluation): Bumps on lip similar to rash the beginning of 2020. Pt reports it was due to wearing  A different face mask.     Precipitating or alleviating factors:  New exposures:  None  Recent travel: no      Therapies tried and outcome: Tylenol, hot showers hot packs outcome helpful for pain.     She is currently pumping breast milk for her child, who is still in the NICU. Shanda was exposed to her brother, who had a similar rash. No other new products, lotions, or soaps. She has noticed bumps under her arms and on her back and abdomen; some on upper legs. She has not put anything on them topically.   -------------------------------------    Patient Active Problem List   Diagnosis     Supervision of normal first pregnancy     Rh negative status during pregnancy     Vitamin D deficiency     History of MRSA infection     History of sexually transmitted disease     Abnormal Pap smear of cervix     Need for Tdap vaccination     Rash and nonspecific skin eruption     Non-reassuring electronic fetal monitoring tracing     IUGR (intrauterine growth restriction) affecting care of mother     S/P  section     Morbid obesity (H)     Past Surgical History:   Procedure Laterality Date      SECTION N/A 2020    Procedure:  SECTION;  Surgeon: Mago Dumont MD;  Location:  L+D       Social History     Tobacco Use     Smoking status: Current Every Day Smoker     Types: Cigarettes     Smokeless tobacco: Never Used   Substance Use Topics     Alcohol use: Not Currently     Family History   Problem Relation Age of Onset      "Diabetes Maternal Uncle      Breast Cancer Maternal Aunt            Reviewed and updated as needed this visit by Provider         Review of Systems   Constitutional, HEENT, cardiovascular, pulmonary, gi and gu systems are negative, except as otherwise noted.      Objective    /60   Pulse 104   Temp 96.9  F (36.1  C) (Temporal)   Resp 16   Ht 1.727 m (5' 8\")   Wt 122 kg (269 lb)   SpO2 100%   BMI 40.90 kg/m    Body mass index is 40.9 kg/m .  Physical Exam   GENERAL: healthy, alert and no distress  NECK: no adenopathy, no asymmetry, masses, or scars and thyroid normal to palpation  RESP: lungs clear to auscultation - no rales, rhonchi or wheezes  CV: regular rate and rhythm, normal S1 S2, no S3 or S4, no murmur, click or rub, no peripheral edema and peripheral pulses strong  SKIN: multiple erythematous round lesions, some with induration    Diagnostic Test Results:  Labs reviewed in Epic        Assessment & Plan       ICD-10-CM    1. Folliculitis  L73.9 clindamycin (CLEOCIN) 300 MG capsule     fluconazole (DIFLUCAN) 150 MG tablet   2. Morbid obesity (H)  E66.01         Tobacco Cessation:   reports that she has been smoking cigarettes. She has never used smokeless tobacco.        BMI:   Estimated body mass index is 40.9 kg/m  as calculated from the following:    Height as of this encounter: 1.727 m (5' 8\").    Weight as of this encounter: 122 kg (269 lb).       No follow-ups on file.    [facility]    "

## 2020-07-21 ENCOUNTER — OFFICE VISIT (OUTPATIENT)
Dept: FAMILY MEDICINE | Facility: CLINIC | Age: 30
End: 2020-07-21
Payer: COMMERCIAL

## 2020-07-21 VITALS
OXYGEN SATURATION: 100 % | HEART RATE: 104 BPM | TEMPERATURE: 96.9 F | WEIGHT: 269 LBS | BODY MASS INDEX: 40.77 KG/M2 | RESPIRATION RATE: 16 BRPM | DIASTOLIC BLOOD PRESSURE: 60 MMHG | HEIGHT: 68 IN | SYSTOLIC BLOOD PRESSURE: 102 MMHG

## 2020-07-21 DIAGNOSIS — E66.01 MORBID OBESITY (H): ICD-10-CM

## 2020-07-21 DIAGNOSIS — L73.9 FOLLICULITIS: Primary | ICD-10-CM

## 2020-07-21 PROCEDURE — 99213 OFFICE O/P EST LOW 20 MIN: CPT | Performed by: NURSE PRACTITIONER

## 2020-07-21 RX ORDER — FLUCONAZOLE 150 MG/1
TABLET ORAL
Qty: 2 TABLET | Refills: 0 | Status: SHIPPED | OUTPATIENT
Start: 2020-07-21 | End: 2020-12-23

## 2020-07-21 RX ORDER — CLINDAMYCIN HCL 300 MG
300 CAPSULE ORAL 4 TIMES DAILY
Qty: 40 CAPSULE | Refills: 0 | Status: SHIPPED | OUTPATIENT
Start: 2020-07-21 | End: 2020-07-31

## 2020-07-21 ASSESSMENT — PAIN SCALES - GENERAL: PAINLEVEL: SEVERE PAIN (6)

## 2020-07-21 ASSESSMENT — MIFFLIN-ST. JEOR: SCORE: 1998.68

## 2020-07-22 ENCOUNTER — TELEPHONE (OUTPATIENT)
Dept: FAMILY MEDICINE | Facility: CLINIC | Age: 30
End: 2020-07-22

## 2020-07-22 DIAGNOSIS — L73.9 FOLLICULITIS: Primary | ICD-10-CM

## 2020-07-22 RX ORDER — CLINDAMYCIN HCL 150 MG
300 CAPSULE ORAL 4 TIMES DAILY
Qty: 80 CAPSULE | Refills: 0 | Status: SHIPPED | OUTPATIENT
Start: 2020-07-22 | End: 2020-12-23

## 2020-07-22 NOTE — TELEPHONE ENCOUNTER
Reason for call:  Other   Patient called regarding (reason for call): call back  Additional comments:patient called and stated that she would like a letter sent to her my chart showing that she had an appointment on 7-21-20, and also the pharmacy will not fill clindamycin (CLEOCIN) 300 MG capsule due to the strand. Please call patient back.    Phone number to reach patient:  Cell number on file:    Telephone Information:   Mobile [phone]       Best Time:  na    Can we leave a detailed message on this number?  YES    Travel screening: Not Applicable

## 2020-07-22 NOTE — TELEPHONE ENCOUNTER
Spoke with patient and let her know Rx was sent to pharmacy with different dosing per insurance coverage and letter signed. Verbalized understanding    Dione Cabrera RN   [facility]

## 2020-07-22 NOTE — LETTER
[address]  [facility] [address]  [phone]          July 22, 2020    Shanda Manrique                                                                                                                     [address] [place] [address]            Re: Shanda Manrique    To whom it may concern:    Shanda has been under my medical care. She was seen in clinic on 7/21/20 for a medical appointment.        Sincerely,         Carol Sahu CNP

## 2020-07-23 LAB
ABO + RH BLD: ABNORMAL
ABO + RH BLD: ABNORMAL
BLOOD BANK CMNT PATIENT-IMP: ABNORMAL
DATE RH IMM GL GVN: ABNORMAL
FETAL CELL SCN BLD QL ROSETTE: ABNORMAL
RH IG VIALS RECOM PATIENT: ABNORMAL

## 2020-08-18 ENCOUNTER — PRENATAL OFFICE VISIT (OUTPATIENT)
Dept: OBGYN | Facility: CLINIC | Age: 30
End: 2020-08-18
Payer: COMMERCIAL

## 2020-08-18 VITALS
SYSTOLIC BLOOD PRESSURE: 130 MMHG | WEIGHT: 283 LBS | DIASTOLIC BLOOD PRESSURE: 88 MMHG | HEART RATE: 87 BPM | BODY MASS INDEX: 43.03 KG/M2 | TEMPERATURE: 98.1 F

## 2020-08-18 DIAGNOSIS — Z12.4 SCREENING FOR MALIGNANT NEOPLASM OF CERVIX: ICD-10-CM

## 2020-08-18 DIAGNOSIS — L73.2 HIDRADENITIS SUPPURATIVA: ICD-10-CM

## 2020-08-18 DIAGNOSIS — L73.9 FOLLICULITIS: ICD-10-CM

## 2020-08-18 PROCEDURE — G0145 SCR C/V CYTO,THINLAYER,RESCR: HCPCS | Performed by: OBSTETRICS & GYNECOLOGY

## 2020-08-18 PROCEDURE — 99213 OFFICE O/P EST LOW 20 MIN: CPT | Mod: 24 | Performed by: OBSTETRICS & GYNECOLOGY

## 2020-08-18 PROCEDURE — 99207 ZZC POST PARTUM EXAM: CPT | Performed by: OBSTETRICS & GYNECOLOGY

## 2020-08-18 RX ORDER — CLINDAMYCIN PHOSPHATE 10 MG/G
GEL TOPICAL 2 TIMES DAILY
Qty: 60 G | Refills: 4 | Status: SHIPPED | OUTPATIENT
Start: 2020-08-18 | End: 2020-12-23

## 2020-08-18 NOTE — PROGRESS NOTES
"OB GYN CLINIC VISIT  2020    CC: postpartum visit    SUBJECTIVE:  Shanda Manrique is a 30 year old female  here for a postpartum visit.  She had a  Section  on 20 delivering a healthy baby boy \"Amar\"  at 29w3d.      OB History    Para Term  AB Living   1 1 0 1 0 1   SAB TAB Ectopic Multiple Live Births   0 0 0 0 1      # Outcome Date GA Lbr Lucius/2nd Weight Sex Delivery Anes PTL Lv   1  20 29w3d   M CS-LTranv Spinal N ELIZABETH      Complications: Fetal Intolerance, Pre-eclampsia, severe, IUGR (intrauterine growth retardation) affecting mother      Name: Real      Apgar1: 6  Apgar5: 9     delivery complications:  Yes - pre-eclampsia with severe features requiring delivery at 29 weeks. Fetal IUGR with category 2 tracing.   breast feeding:  No  bladder problems:  No  bowel problems/hemorrhoids:  No  episiotomy/laceration/incision healed? Yes  vaginal flow:  None  [place]:  No  contraception:  none  emotional adjustment:  doing well, happy and tired  back to work: Not yet    Patient complains of boils and itching in armpits and vulvar area. Has been going on for many years. Was told to use selsen blue soap in the past, did not help. Was put on oral clinda but did not take it - has a hard time remembering to take medication, prefers topical treatment. Lump in right groin now, was bleeding because she scratched it. Hair on mons and armpit very itchy sometimes.     Current Outpatient Medications   Medication     benzoyl peroxide 5 % external liquid     clindamycin (CLINDAMAX) 1 % external gel     acetaminophen (TYLENOL) 325 MG tablet     cetirizine (ZYRTEC) 10 MG tablet     clindamycin (CLEOCIN) 150 MG capsule     fluconazole (DIFLUCAN) 150 MG tablet     ibuprofen (ADVIL/MOTRIN) 600 MG tablet     NIFEdipine ER OSMOTIC (ADALAT CC) 30 MG 24 hr tablet     oxyCODONE (ROXICODONE) 5 MG tablet     Prenatal Vit-Fe Fumarate-FA (PRENATAL MULTIVITAMIN W/IRON) 27-0.8 MG tablet     " senna-docusate (SENOKOT-S/PERICOLACE) 8.6-50 MG tablet     vitamin D3 (CHOLECALCIFEROL) 2000 units (50 mcg) tablet     No current facility-administered medications for this visit.        OBJECTIVE:  Blood pressure 130/88, pulse 87, temperature 98.1  F (36.7  C), temperature source Oral, weight 128.4 kg (283 lb), last menstrual period 08/15/2020, not currently breastfeeding.   General - pleasant female in no acute distress.  Breast - no nodularity, asymmetry or nipple discharge bilaterally.  Abdomen - soft, nontender, nondistended, no hepatosplenomegaly.  Pelvic - diffuse scarring on mons and upper inner thigh, cyst in groin consistent with hidradenitis. EG: normal adult female, BUS: within normal limits, Vagina: well rugated, no discharge, Cervix: normal, no lesions or CMT, Uterus: firm, normal sized and nontender, Adnexae: no masses or tenderness.  Rectovaginal - deferred.    ASSESSMENT:  30 year old  with normal postpartum exam    PLAN:  1. Routine postpartum follow-up  Discussed kegel exercises   May resume normal activities without restrictions  Pap smear was  done today  Due to prematurity of her infant and his ongoing health issues (requiring supplemental oxygen and extra monitoring) patient planning on taking 6 months off of work. Will complete medical leave request paperwork to reflect this. If additional time off needed beyond these 6 months discussed with patient that she should have her pediatrician complete that paperwork as it would be for caring for baby.    2. Screening for malignant neoplasm of cervix  - Pap imaged thin layer screen reflex to HPV if ASCUS - recommend age 25 - 29    3. Folliculitis  - clindamycin (CLINDAMAX) 1 % external gel; Apply topically 2 times daily  Dispense: 60 g; Refill: 4  - benzoyl peroxide 5 % external liquid; Used once daily to wash the affected area  Dispense: 148 g; Refill: 4    4. Hidradenitis suppurativa  - DERMATOLOGY REFERRAL  - benzoyl peroxide 5 % external  liquid; Used once daily to wash the affected area  Dispense: 148 g; Refill: 4      The patient will use none for birth control. Patient not in a relationship, planning abstinence. Discussed condoms, emergency contraception. Strongly recommend no pregnancy for at least 12-18 months. Full counseling was provided, and all questions answered. Compliance is strongly emphasized.    Return to clinic in one year for an annual, sooner PRN.    Maryann Dong MD

## 2020-08-19 ENCOUNTER — TELEPHONE (OUTPATIENT)
Dept: OBGYN | Facility: CLINIC | Age: 30
End: 2020-08-19

## 2020-08-19 ENCOUNTER — TELEPHONE (OUTPATIENT)
Dept: DERMATOLOGY | Facility: CLINIC | Age: 30
End: 2020-08-19
Payer: COMMERCIAL

## 2020-08-19 NOTE — TELEPHONE ENCOUNTER
[facility] Call Center    Phone Message    May a detailed message be left on voicemail: yes     Reason for Call: Other: Pt is referred by Dr Maryann Dong at [facility] to Dr Hawkins for Hidradenitis suppurativa. Records and referral in [facility] for review, thanks!     Action Taken: Message routed to:  [facility] ([facility] Dermatology    Travel Screening: Not Applicable

## 2020-08-19 NOTE — TELEPHONE ENCOUNTER
Forms received and filled out, signed by provider. Faxed to number provided and sent to HIM.  Cathy Umanzor, Surgery Coordinator

## 2020-08-20 LAB
COPATH REPORT: NORMAL
PAP: NORMAL

## 2020-08-25 PROBLEM — R87.619 ABNORMAL PAP SMEAR OF CERVIX: Status: RESOLVED | Noted: 2020-04-21 | Resolved: 2020-08-25

## 2020-09-11 ENCOUNTER — OFFICE VISIT (OUTPATIENT)
Dept: OPHTHALMOLOGY | Facility: CLINIC | Age: 30
End: 2020-09-11
Payer: COMMERCIAL

## 2020-09-11 DIAGNOSIS — H52.223 REGULAR ASTIGMATISM OF BOTH EYES: Primary | ICD-10-CM

## 2020-09-11 ASSESSMENT — REFRACTION_MANIFEST
OD_AXIS: 091
OD_SPHERE: -4.50
OS_CYLINDER: +1.75
OS_CYLINDER: +2.50
OD_CYLINDER: +3.75
OS_SPHERE: -3.50
OD_SPHERE: -4.50
OS_AXIS: 105
OS_AXIS: 090
OD_AXIS: 092
OD_CYLINDER: +3.00
OS_SPHERE: -4.00

## 2020-09-11 ASSESSMENT — CUP TO DISC RATIO
OD_RATIO: 0.25
OS_RATIO: 0.25

## 2020-09-11 ASSESSMENT — VISUAL ACUITY
OS_SC: 20/50
METHOD_MR_RETINOSCOPY: 1
METHOD: SNELLEN - LINEAR
OD_SC: 20/30

## 2020-09-11 ASSESSMENT — TONOMETRY
OD_IOP_MMHG: 20
OS_IOP_MMHG: 18
IOP_METHOD: TONOPEN

## 2020-09-11 ASSESSMENT — CONF VISUAL FIELD
OS_NORMAL: 1
METHOD: COUNTING FINGERS
OD_NORMAL: 1

## 2020-09-11 ASSESSMENT — SLIT LAMP EXAM - LIDS
COMMENTS: NORMAL
COMMENTS: NORMAL

## 2020-09-11 ASSESSMENT — EXTERNAL EXAM - LEFT EYE: OS_EXAM: NORMAL

## 2020-09-11 ASSESSMENT — EXTERNAL EXAM - RIGHT EYE: OD_EXAM: NORMAL

## 2020-09-11 NOTE — PROGRESS NOTES
Assessment/Plan  (H52.223) Regular astigmatism of both eyes  (primary encounter diagnosis)  Comment: Ocular health WNL today  Plan: REFRACTION [13985]        Discussed findings with patient. New spectacle prescription dispensed to patient. Patient is welcome to return to clinic with prolonged adaptation difficulties.       Complete documentation of historical and exam elements from today's encounter can  be found in the full encounter summary report (not reduplicated in this progress  note). I personally obtained the chief complaint(s) and history of present illness. I  confirmed and edited as necessary the review of systems, past medical/surgical  history, family history, social history, and examination findings as documented by  others; and I examined the patient myself. I personally reviewed the relevant tests,  images, and reports as documented above. I formulated and edited as necessary the  assessment and plan and discussed the findings and management plan with the  patient and family.    Everett Broderick, OD

## 2020-09-11 NOTE — NURSING NOTE
"Chief Complaints and History of Present Illnesses   Patient presents with     COMPREHENSIVE EYE EXAM     Chief Complaint(s) and History of Present Illness(es)     COMPREHENSIVE EYE EXAM     Laterality: both eyes    Quality: blurred vision    Context: distance vision and near vision    Associated symptoms: Negative for dryness, tearing, flashes, floaters and eye pain    Pain scale: 0/10              Comments     Patient notes she had C section June 29th, was \"supposed to wear gls when I was little but never did\", she notes VA is blurred     Naomi Campbell COT September 11, 2020 8:19 AM                  "

## 2020-10-08 NOTE — TELEPHONE ENCOUNTER
FUTURE VISIT INFORMATION      FUTURE VISIT INFORMATION:    Date: 3.25.21    Time: 1:00    Location: [facility]  REFERRAL INFORMATION:    Referring provider:  Dr. Maryann Dong    Referring providers clinic:  [facility] [place]    Reason for visit/diagnosis  new HS    RECORDS REQUESTED FROM:       Clinic name Comments Records Status Imaging Status   [facility] Derm 12.23.20  Dr. Vonda Sanders N/A   [facility] Ob/Gyn 8.18.20  Dr. Iker Sanders N/A

## 2020-10-29 ENCOUNTER — MEDICAL CORRESPONDENCE (OUTPATIENT)
Dept: HEALTH INFORMATION MANAGEMENT | Facility: CLINIC | Age: 30
End: 2020-10-29

## 2020-11-05 ENCOUNTER — PRE VISIT (OUTPATIENT)
Dept: DERMATOLOGY | Facility: CLINIC | Age: 30
End: 2020-11-05

## 2020-11-22 ENCOUNTER — HEALTH MAINTENANCE LETTER (OUTPATIENT)
Age: 30
End: 2020-11-22

## 2020-12-23 ENCOUNTER — OFFICE VISIT (OUTPATIENT)
Dept: DERMATOLOGY | Facility: CLINIC | Age: 30
End: 2020-12-23
Payer: COMMERCIAL

## 2020-12-23 DIAGNOSIS — L73.2 HIDRADENITIS SUPPURATIVA: Primary | ICD-10-CM

## 2020-12-23 PROCEDURE — 99202 OFFICE O/P NEW SF 15 MIN: CPT | Mod: 25 | Performed by: DERMATOLOGY

## 2020-12-23 PROCEDURE — 11901 INJECT SKIN LESIONS >7: CPT | Performed by: DERMATOLOGY

## 2020-12-23 RX ORDER — CLINDAMYCIN PHOSPHATE 10 UG/ML
LOTION TOPICAL
Qty: 120 ML | Refills: 11 | Status: SHIPPED | OUTPATIENT
Start: 2020-12-23 | End: 2022-02-11

## 2020-12-23 RX ORDER — DOXYCYCLINE 100 MG/1
100 CAPSULE ORAL 2 TIMES DAILY
Qty: 60 CAPSULE | Refills: 2 | Status: SHIPPED | OUTPATIENT
Start: 2020-12-23 | End: 2022-02-11

## 2020-12-23 ASSESSMENT — PAIN SCALES - GENERAL: PAINLEVEL: SEVERE PAIN (6)

## 2020-12-23 NOTE — PROGRESS NOTES
"[facility] Dermatology Note      Dermatology Problem List:  1. Hidradenitis suppurativa, at least Olivas Stage II  - Labs: A1c, lipid panel pending  - doxycycline 100 mg PO BID, clindamycin 1% lotion/BP wash, ILK  - prior tx: BP 5% wash, clindamycin 1% gel    CC:   Chief Complaint   Patient presents with     Derm Problem     HS - groin, axilla, under breasts - no treatment.     Encounter Date: Dec 23, 2020    History of Present Illness:  Ms. Shanda Manrique is a 30 year old female who presents as a referral from Dr. Dong (OB-Gyn) for possible hidradenitis suppurativa. She states she has had a 10+ year history of \"boils\" in her axilla, under breasts, and groin area since she was a teenager. She states the lesions are tender and sometimes drain pus. She does occasionally express them herself. She has had some I&D'ed and treated with oral antibiotics in the past. She was prescribed topical therapies by her OB-Gyn, including clinda gel and benzoyl peroxide wash, but states she never started these. No family members with similar lesions. She is a smoker and typically smokes 5-6 cigarettes per day; trying to quit. No alcohol use. She is recently s/p pregnancy (delivered June 2020); no longer breastfeeding. Prior to her pregnancy, she had a regular menstrual cycle though these were heavy. She otherwise does not have any other health issues and does not take any medications regularly. Does report a history of diabetes in multiple family members. Health otherwise stable. No other skin concerns. Of note, patient is currently having her menstrual cycle and would like to defer genital exam today.     Past Medical History:   Patient Active Problem List   Diagnosis     Supervision of normal first pregnancy     Rh negative status during pregnancy     Vitamin D deficiency     History of MRSA infection     History of sexually transmitted disease     Need for Tdap vaccination     Rash and nonspecific skin " eruption     Non-reassuring electronic fetal monitoring tracing     IUGR (intrauterine growth restriction) affecting care of mother     S/P  section     Morbid obesity (H)     No past medical history on file.  Past Surgical History:   Procedure Laterality Date      SECTION N/A 2020    Procedure:  SECTION;  Surgeon: Mago Dumont MD;  Location: [facility]     CHALAZION EXCISION         Social History:  Patient reports that she has been smoking cigarettes. She has never used smokeless tobacco. She reports previous alcohol use. She reports current drug use. Drug: Marijuana.    Family History:  Family History   Problem Relation Age of Onset     Diabetes Maternal Uncle      Breast Cancer Maternal Aunt      Glaucoma No family hx of      Macular Degeneration No family hx of      Melanoma No family hx of      Skin Cancer No family hx of        Medications:  No current outpatient medications on file.     No Known Allergies      Review of Systems:  -As per HPI  -Constitutional: Otherwise feeling well today, in usual state of health.  -Skin: As above in HPI. No additional skin concerns.    Physical exam:  Vitals: There were no vitals taken for this visit.  GEN: This is a well developed, well-nourished female in no acute distress, in a pleasant mood.    SKIN: Waist-up skin, which includes the head/face, neck, both arms, chest, back, abdomen, digits and/or nails was examined.  -Bah skin type: V  -Scattered tender, pink nodules, some with yellow purulent drainage on bilateral axilla, flank, lateral breasts. There are admixed comedones and areas of scarring. No definitive sinus tracts appreciated.   - Hyperpigmented atrophic macules c/w scarring on mammary folds  - Patient deferred genital exam  -No other lesions of concern on areas examined.     Labs:  None.     Impression/Plan:    1. Hidradenitis suppurativa, at least Olivas Stage II (genital exam not performed). Etiology discussed.  Recommended lab work-up as below for associated metabolic disorders especially given obesity. We also discussed measures such as weight loss and smoking cessation to improve symptoms. Recommended trial of oral doxycycline, topical therapies, and ILK as below. Will arrange for follow-up in 6 weeks for repeat ILK injections. Has appointment with Dr. Hawkins in March 2021.  - Labs: CBC w/ diff, CMP, A1c, lipid panel  - Start doxycycline 100 mg PO BID. Side effects of GI upset, nausea, diarrhea, photosensitivity discussed. Advised patient should not get pregnant or breastfeed while taking.   - Start clindamycin 1% lotion qdaily and BP 5% wash in the shower daily  - Discussed smoking cessation, weight loss  - Consider addition of oral spironolactone and/or metformin pending lab work-up as above    PROCEDURE NOTE  - Kenalog intralesional injection procedure note: After verbal consent and discussion of risks including but not limited to atrophy, pain, and bruising, time out was performed, the patient underwent positioning and the area was prepped with isopropyl alcohol, 1. total cc of Kenalog 10 mg/cc was injected into 13 site(s) on the bilateral axilla/breast. The patient tolerated the procedure well and left the Dermatology clinic in good condition.    Follow-up in 6 weeks, earlier for new or changing lesions.     Staff Involved:  Staff Only    Marquis Ferrari MD  Pronouns: he/him/his    Department of Dermatology  [facility]: Phone: [phone], Fax:[phone]  [facility]: Phone: [phone] Fax: [phone]

## 2020-12-23 NOTE — PROGRESS NOTES
Drug Administration Record    Prior to injection, verified patient identity using patient's name and date of birth.  Due to injection administration, patient instructed to remain in clinic for 15 minutes  afterwards, and to report any adverse reaction to me immediately.    Drug Name: triamcinolone acetonide(kenalog)  Dose: 1.3mL of triamcinolone 10mg/mL, 13mg dose  Route administered: ID  NDC #: Kenalog-10 (0364-9383-79)  Amount of waste(mL):3.7  Reason for waste: Multi dose vial    LOT #: [id number]  SITE: see note  : [employer]  EXPIRATION DATE: 3/2022

## 2020-12-23 NOTE — PATIENT INSTRUCTIONS
1. Start doxycycline 100 mg twice daily with food.   2. Start using clindamycin 1% lotion in the morning to armpits, under breasts, and groin area.   3. Start using over-the-counter benzoyl peroxide 5% wash in the shower daily to involved areas.   4. Follow-up in 6 weeks.

## 2020-12-23 NOTE — LETTER
"12/23/2020       RE: Shanda Manrique  [address]  [place] [address]     Dear Colleague,    Thank you for referring your patient, Shanda Manrique, to the [facility] [place] at [facility]. Please see a copy of my visit note below.    [facility] Dermatology Note      Dermatology Problem List:  1. Hidradenitis suppurativa, at least Olivas Stage II  - Labs: A1c, lipid panel pending  - doxycycline 100 mg PO BID, clindamycin 1% lotion/BP wash, ILK  - prior tx: BP 5% wash, clindamycin 1% gel    CC:   Chief Complaint   Patient presents with     Derm Problem     HS - groin, axilla, under breasts - no treatment.     Encounter Date: Dec 23, 2020    History of Present Illness:  Ms. Shanda Manrique is a 30 year old female who presents as a referral from Dr. Dong (OB-Gyn) for possible hidradenitis suppurativa. She states she has had a 10+ year history of \"boils\" in her axilla, under breasts, and groin area since she was a teenager. She states the lesions are tender and sometimes drain pus. She does occasionally express them herself. She has had some I&D'ed and treated with oral antibiotics in the past. She was prescribed topical therapies by her OB-Gyn, including clinda gel and benzoyl peroxide wash, but states she never started these. No family members with similar lesions. She is a smoker and typically smokes 5-6 cigarettes per day; trying to quit. No alcohol use. She is recently s/p pregnancy (delivered June 2020); no longer breastfeeding. Prior to her pregnancy, she had a regular menstrual cycle though these were heavy. She otherwise does not have any other health issues and does not take any medications regularly. Does report a history of diabetes in multiple family members. Health otherwise stable. No other skin concerns. Of note, patient is currently having her menstrual cycle and would like to defer genital exam today.     Past " Medical History:   Patient Active Problem List   Diagnosis     Supervision of normal first pregnancy     Rh negative status during pregnancy     Vitamin D deficiency     History of MRSA infection     History of sexually transmitted disease     Need for Tdap vaccination     Rash and nonspecific skin eruption     Non-reassuring electronic fetal monitoring tracing     IUGR (intrauterine growth restriction) affecting care of mother     S/P  section     Morbid obesity (H)     No past medical history on file.  Past Surgical History:   Procedure Laterality Date      SECTION N/A 2020    Procedure:  SECTION;  Surgeon: Mago Dumont MD;  [facility]: [facility]     CHALAZION EXCISION         Social History:  Patient reports that she has been smoking cigarettes. She has never used smokeless tobacco. She reports previous alcohol use. She reports current drug use. Drug: Marijuana.    Family History:  Family History   Problem Relation Age of Onset     Diabetes Maternal Uncle      Breast Cancer Maternal Aunt      Glaucoma No family hx of      Macular Degeneration No family hx of      Melanoma No family hx of      Skin Cancer No family hx of        Medications:  No current outpatient medications on file.     No Known Allergies      Review of Systems:  -As per HPI  -Constitutional: Otherwise feeling well today, in usual state of health.  -Skin: As above in HPI. No additional skin concerns.    Physical exam:  Vitals: There were no vitals taken for this visit.  GEN: This is a well developed, well-nourished female in no acute distress, in a pleasant mood.    SKIN: Waist-up skin, which includes the head/face, neck, both arms, chest, back, abdomen, digits and/or nails was examined.  -Bah skin type: V  -Scattered tender, pink nodules, some with yellow purulent drainage on bilateral axilla, flank, lateral breasts. There are admixed comedones and areas of scarring. No definitive sinus tracts appreciated.   -  Hyperpigmented atrophic macules c/w scarring on mammary folds  - Patient deferred genital exam  -No other lesions of concern on areas examined.     Labs:  None.     Impression/Plan:    1. Hidradenitis suppurativa, at least Olivas Stage II (genital exam not performed). Etiology discussed. Recommended lab work-up as below for associated metabolic disorders especially given obesity. We also discussed measures such as weight loss and smoking cessation to improve symptoms. Recommended trial of oral doxycycline, topical therapies, and ILK as below. Will arrange for follow-up in 6 weeks for repeat ILK injections. Has appointment with Dr. Hawkins in March 2021.  - Labs: CBC w/ diff, CMP, A1c, lipid panel  - Start doxycycline 100 mg PO BID. Side effects of GI upset, nausea, diarrhea, photosensitivity discussed. Advised patient should not get pregnant or breastfeed while taking.   - Start clindamycin 1% lotion qdaily and BP 5% wash in the shower daily  - Discussed smoking cessation, weight loss  - Consider addition of oral spironolactone and/or metformin pending lab work-up as above    PROCEDURE NOTE  - Kenalog intralesional injection procedure note: After verbal consent and discussion of risks including but not limited to atrophy, pain, and bruising, time out was performed, the patient underwent positioning and the area was prepped with isopropyl alcohol, 1. total cc of Kenalog 10 mg/cc was injected into 13 site(s) on the bilateral axilla/breast. The patient tolerated the procedure well and left the Dermatology clinic in good condition.    Follow-up in 6 weeks, earlier for new or changing lesions.     Staff Involved:  Staff Only    Marquis Ferrari MD  Pronouns: he/him/his    Department of Dermatology  [facility]: Phone: [phone], Fax:[phone]  [facility]: Phone: [phone] Fax:  [phone]          Drug Administration Record    Prior to injection, verified patient identity using patient's name and date of birth.  Due to injection administration, patient instructed to remain in clinic for 15 minutes  afterwards, and to report any adverse reaction to me immediately.    Drug Name: triamcinolone acetonide(kenalog)  Dose: 1.3mL of triamcinolone 10mg/mL, 13mg dose  Route administered: ID  NDC #: Kenalog-10 (0527-2897-54)  Amount of waste(mL):3.7  Reason for waste: Multi dose vial    LOT #: [id number]  SITE: see note  : [employer]  EXPIRATION DATE: 3/2022

## 2021-03-25 ENCOUNTER — OFFICE VISIT (OUTPATIENT)
Dept: DERMATOLOGY | Facility: CLINIC | Age: 31
End: 2021-03-25
Payer: COMMERCIAL

## 2021-03-25 VITALS — DIASTOLIC BLOOD PRESSURE: 70 MMHG | HEART RATE: 87 BPM | SYSTOLIC BLOOD PRESSURE: 105 MMHG

## 2021-03-25 DIAGNOSIS — L73.2 HIDRADENITIS SUPPURATIVA: ICD-10-CM

## 2021-03-25 DIAGNOSIS — L23.0 ALLERGIC CONTACT DERMATITIS DUE TO METALS: Primary | ICD-10-CM

## 2021-03-25 PROCEDURE — 99214 OFFICE O/P EST MOD 30 MIN: CPT | Mod: GC | Performed by: DERMATOLOGY

## 2021-03-25 RX ORDER — DESONIDE 0.5 MG/G
CREAM TOPICAL
Qty: 60 G | Refills: 3 | Status: SHIPPED | OUTPATIENT
Start: 2021-03-25 | End: 2021-04-08

## 2021-03-25 RX ORDER — TRIAMCINOLONE ACETONIDE 1 MG/G
OINTMENT TOPICAL
Qty: 80 G | Refills: 1 | Status: SHIPPED | OUTPATIENT
Start: 2021-03-25 | End: 2022-02-11

## 2021-03-25 ASSESSMENT — PAIN SCALES - GENERAL: PAINLEVEL: SEVERE PAIN (7)

## 2021-03-25 NOTE — LETTER
3/25/2021       RE: Shanda Manrique  [address]  [place] [address]     Dear Colleague,    Thank you for referring your patient, Shanda Manrique, to the [facility]MAT[facility] [place] at [facility]. Please see a copy of my visit note below.    [facility] Dermatology Note  Encounter Date: Mar 25, 2021  Office Visit     Dermatology Problem List:  1. Hidradenitis suppurativa, at least Olivas Stage II  - Labs: A1c, lipid panel pending  - doxycycline 100 mg PO BID, clindamycin 1% lotion/BP wash, ILK  - prior tx: BP 5% wash, clindamycin 1% gel  2. Likely allergic contact dermatitis 2/2 nickel  - Current Rx: triamcinolone 0.1% ointment BID prn   ____________________________________________    Assessment & Plan:     #. Moderate to moderate-severe HS, Olivas Stage II   Discussed the complex etiology of HS with the patient at length today, including the fact that it is not an infectious disease. Discussed that there is unfortunately no cure currently. Also discussed potential treatment options including antibiotics, anti-androgens (spironolactone), metformin, and TNF-alpha inhibitors. Surgery was also discussed as an option for more severe involvement. Her symptoms are severely affecting her quality of life. She would like to think about all these treatment options and follow up in a month.    - Ok to continue her BPO 5% wash and clindamycin gel for now while she thinks about other options  - Detailed handouts provided   - Mepilex w/ border 4x4 bandages prescribed today for use on her painful, draining lesions in the groin   - Start desonide 0.05% cream BID prn for pruritus in the groin area     #. Likely allergic contact dermatitis 2/2 nickel   Mild involvement on the central abdomen. Counseled avoidance of nickel products. Patient has a history of eczema as a child and currently has seasonal allergies.   - Start triamcinolone 0.1%  ointment BID prn to abdomen and extremities    Procedures Performed:   - None.     Wound Care Order Documentation    Patient presents for evaluation and treatment of hidradenitis suppurativa (ICD 10: L73.2).  The wound it neither surgical/debrided or related to a burn or pressure injury.  Stage: Unstageable    The patient is in need of wound care and dressing changes for management and healing of their current wound(s).     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[employer] / Plan: [employer] PMAP AND MNCARE / Product Type: Indemnity /   Policy ID#:  [id number]    Bandage Order/Info 3/25/2021   Wound Size (Pubis/Genitals) LxWxD  [id number]   Drainage Amount (Pubis/Genitals) Very Heavy   Primary Bandage (Pubis/Genital) Mepilex with border   Bandage Size (Pubis/Genital) 4x4 in   Wound Size (Left Thigh) LxWxD  2x.5x.1cm   Drainage Amount (Left Thigh) Heavy   Primary Bandage (Left Thigh) Mepilex with border   Bandage Size (Left Thigh) 4x4 in   Change Freq (Left Thigh) Daily     Total for 90 days =180 bandages  For 1 year    Follow-up: 1 month(s) in-person or telephone, or earlier for new or changing lesions    Staff and Resident:     Patient was seen and discussed with attending physician, Dr. Hawkins.     Monalisa Reyes MD/MPH  Internal Medicine/Dermatology  PGY-4    Staff Physician Comments:   I saw and evaluated the patient with the resident and I agree with the assessment and plan.  I was present for the examination.    Shyam Hawkins MD, FAAD    Departments of Internal Medicine and Dermatology  [facility]  [phone]      ____________________________________________    CC: Derm Problem (HS, affects in groin, axillae, stomach)    HPI:  Ms. Shanda Manrique is a(n) 30 year old female who presents today as a return patient for follow up of HS. She first started getting boils when she was about 14 years old. She last saw Dr. Ferrari in December and then was referred  "to Dr. Hawkins for further evaluation and treatment options. She spent a long time (16 years) before anyone diagnosed her with HS. When she saw Dr. Ferrari she was prescribed doxycycline 100mg BID, clindamycin solution and BPO wash. She tried the doxycycline and only took a dose or two. She didn't have any adverse effects but felt like she didn't want to \"pop pills\" all day. She also tried the clindamycin and benzoyl peroxide which seemed to help a little bit but then she flare in her groin area so she was worried it was making things worse. She had ILK but didn't feel like it helped her much.      It doesn't seem to flare with her menstrual cycle. However, it got worse during her recent pregnancy (1st child) and it still getting worse. Before then she would only get a couple lesions every few often. Now she is getting about 10 new lesions a month. She is not on any birth control right now. Her menstrual cycles are sometimes irregular in the day they start. It took her a while to get pregnancy. One doctor tried to diagnose her with PCOS but she wasn't treated for this before. She was tested for diabetes with her pregnancy but was negative.      She used to smoke 1/2 pack to pack of cigarettes per day and has now cut down to 4-5 a day. She also knows her weight affects her HS. No join pains. Has 2-3 bowel movements per day that are normal for her (not loose or bloody).     Using gauze and tape for bandages.    Her mood is depressed, affecting her self-confidence. She denies any thoughts of hurting herself. She's still able to enjoy things. But, she can't wear certain shirts. It sometimes affects her interest in intercourse as well.    She also has a rash on her abdomen that she had since she was pregnant with her child. It happens when she wears certain pants. The groin area in between her legs really itches too.     She has seasonal allergies currently and had eczema when she was younger.     Patient is otherwise " feeling well, without additional skin concerns.    HS Nurse Assessment    Nurse Assessment Data 3/25/2021   Over the past month, about how many recurrent or new boils have you had? 10   Over the past week, how many dressing changes do you do each day? 2   Over the past week, has your wound drainage been: Moderate   Rate your HS overall from 0-10 (0 = no disease, 10 = worst) over the past week:  10   Rate your pain score from 0-10 (0 = no disease, 10 = worst) for the most painful/symptomatic lesion in the past week:  8   Over the past week, how much has HS influenced your quality of life? extremely     Labs Reviewed:  Reviewed prior labs    Physical Exam:  Vitals: There were no vitals taken for this visit.  SKIN: Focused examination of face, axillae, chest, abdomen, groin and upper legs was performed.  - Ill-demarcated thin pink scaly plaques on the central/lower abdomen     HS Exam  No flowsheet data found.    Left Axilla 3/25/2021   # of inflamed nodules 2   # of abcesses 0   # of draining tunnels 0   Erythema (grade) 2   Thickness (grade) 1   Open skin surface (grade) 0   Tunnels (grade) 0       No flowsheet data found.    Chest 3/25/2021   # of inflamed nodules 2   # of abcesses 0   # of draining tunnels 0   Erythema (grade) 1   Thickness (grade) 1   Open skin surface (grade) 0   Tunnels (grade) 0       Abdomen 3/25/2021   # of inflamed nodules 1   # of abcesses 0   # of draining tunnels 0   Erythema (grade) 1   Thickness (grade) 1   Open skin surface (grade) 0   Tunnels (grade) 0        Pubis/Genital 3/25/2021   # of inflamed nodules 1   # of abcesses 0   # of draining tunnels 0   Erythema (grade) 2   Thickness (grade) 1   Open skin surface (grade) 0   Tunnels (grade) 0       Left Thigh 3/25/2021   # of inflamed nodules 0   # of abcesses 0   # of draining tunnels 1   Erythema (grade) 1   Thickness (grade) 1   Open skin surface (grade) 1   Tunnels (grade) 1       Right Thigh 3/25/2021   # of inflamed nodules 1    # of abcesses 0   # of draining tunnels 0   Erythema (grade) 1   Thickness (grade) 1   Open skin surface (grade) 0   Tunnels (grade) 0       No flowsheet data found.    Buttocks 3/25/2021   Pilonidal Disease? No   Pioderma Gangrenosum? No   Cutaneous Crohn's? No       HS Data  HS Exam Data 3/25/2021   LC Type LC1   Clinical Subtypes Regular type   Acne? No   Dissecting Cellulitis? No   Visual analogue score (0-100) 45   Total Olivas Stage II   Total Inflammatory Nodules 7   Total Abcesses 0   Total Draining Tunnels 1   Total Abscess and Nodule Count 7   IHS4 Score  11   Total  HASI Score 19     - No other lesions of concern on areas examined.     Medications:  Current Outpatient Medications   Medication     clindamycin (CLEOCIN T) 1 % external lotion     doxycycline monohydrate (MONODOX) 100 MG capsule     No current facility-administered medications for this visit.       Past Medical History:   Patient Active Problem List   Diagnosis     Supervision of normal first pregnancy     Rh negative status during pregnancy     Vitamin D deficiency     History of MRSA infection     History of sexually transmitted disease     Need for Tdap vaccination     Rash and nonspecific skin eruption     Non-reassuring electronic fetal monitoring tracing     IUGR (intrauterine growth restriction) affecting care of mother     S/P  section     Morbid obesity (H)     History reviewed. No pertinent past medical history.    CC Maryann Dong MD  [address] [place] [address] on close of this encounter.

## 2021-03-25 NOTE — PROGRESS NOTES
[facility] Dermatology Note  Encounter Date: Mar 25, 2021  Office Visit     Dermatology Problem List:  1. Hidradenitis suppurativa, at least Olivas Stage II  - Labs: A1c, lipid panel pending  - doxycycline 100 mg PO BID, clindamycin 1% lotion/BP wash, ILK  - prior tx: BP 5% wash, clindamycin 1% gel  2. Likely allergic contact dermatitis 2/2 nickel  - Current Rx: triamcinolone 0.1% ointment BID prn   ____________________________________________    Assessment & Plan:     #. Moderate to moderate-severe HS, Olivas Stage II   Discussed the complex etiology of HS with the patient at length today, including the fact that it is not an infectious disease. Discussed that there is unfortunately no cure currently. Also discussed potential treatment options including antibiotics, anti-androgens (spironolactone), metformin, and TNF-alpha inhibitors. Surgery was also discussed as an option for more severe involvement. Her symptoms are severely affecting her quality of life. She would like to think about all these treatment options and follow up in a month.    - Ok to continue her BPO 5% wash and clindamycin gel for now while she thinks about other options  - Detailed handouts provided   - Mepilex w/ border 4x4 bandages prescribed today for use on her painful, draining lesions in the groin   - Start desonide 0.05% cream BID prn for pruritus in the groin area     #. Likely allergic contact dermatitis 2/2 nickel   Mild involvement on the central abdomen. Counseled avoidance of nickel products. Patient has a history of eczema as a child and currently has seasonal allergies.   - Start triamcinolone 0.1% ointment BID prn to abdomen and extremities    Procedures Performed:   - None.     Wound Care Order Documentation    Patient presents for evaluation and treatment of hidradenitis suppurativa (ICD 10: L73.2).  The wound it neither surgical/debrided or related to a burn or pressure injury.  Stage: Unstageable    The  patient is in need of wound care and dressing changes for management and healing of their current wound(s).     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[employer] / Plan: [employer] PMAP AND MNCARE / Product Type: Indemnity /   Policy ID#:  [id number]    Bandage Order/Info 3/25/2021   Wound Size (Pubis/Genitals) LxWxD  [id number]   Drainage Amount (Pubis/Genitals) Very Heavy   Primary Bandage (Pubis/Genital) Mepilex with border   Bandage Size (Pubis/Genital) 4x4 in   Wound Size (Left Thigh) LxWxD  2x.5x.1cm   Drainage Amount (Left Thigh) Heavy   Primary Bandage (Left Thigh) Mepilex with border   Bandage Size (Left Thigh) 4x4 in   Change Freq (Left Thigh) Daily     Total for 90 days =180 bandages  For 1 year    Follow-up: 1 month(s) in-person or telephone, or earlier for new or changing lesions    Staff and Resident:     Patient was seen and discussed with attending physician, Dr. Hawkins.     Monalisa Reyes MD/MPH  Internal Medicine/Dermatology  PGY-4    Staff Physician Comments:   I saw and evaluated the patient with the resident and I agree with the assessment and plan.  I was present for the examination.    Shyam Hawknis MD, FAAD    Departments of Internal Medicine and Dermatology  [facility]  [phone]      ____________________________________________    CC: Derm Problem (HS, affects in groin, axillae, stomach)    HPI:  Ms. Shanda Manrique is a(n) 30 year old female who presents today as a return patient for follow up of HS. She first started getting boils when she was about 14 years old. She last saw Dr. Ferrari in December and then was referred to Dr. Hawkins for further evaluation and treatment options. She spent a long time (16 years) before anyone diagnosed her with HS. When she saw Dr. Ferrari she was prescribed doxycycline 100mg BID, clindamycin solution and BPO wash. She tried the doxycycline and only took a dose or two. She didn't have any adverse  "effects but felt like she didn't want to \"pop pills\" all day. She also tried the clindamycin and benzoyl peroxide which seemed to help a little bit but then she flare in her groin area so she was worried it was making things worse. She had ILK but didn't feel like it helped her much.      It doesn't seem to flare with her menstrual cycle. However, it got worse during her recent pregnancy (1st child) and it still getting worse. Before then she would only get a couple lesions every few often. Now she is getting about 10 new lesions a month. She is not on any birth control right now. Her menstrual cycles are sometimes irregular in the day they start. It took her a while to get pregnancy. One doctor tried to diagnose her with PCOS but she wasn't treated for this before. She was tested for diabetes with her pregnancy but was negative.      She used to smoke 1/2 pack to pack of cigarettes per day and has now cut down to 4-5 a day. She also knows her weight affects her HS. No join pains. Has 2-3 bowel movements per day that are normal for her (not loose or bloody).     Using gauze and tape for bandages.    Her mood is depressed, affecting her self-confidence. She denies any thoughts of hurting herself. She's still able to enjoy things. But, she can't wear certain shirts. It sometimes affects her interest in intercourse as well.    She also has a rash on her abdomen that she had since she was pregnant with her child. It happens when she wears certain pants. The groin area in between her legs really itches too.     She has seasonal allergies currently and had eczema when she was younger.     Patient is otherwise feeling well, without additional skin concerns.    HS Nurse Assessment    Nurse Assessment Data 3/25/2021   Over the past month, about how many recurrent or new boils have you had? 10   Over the past week, how many dressing changes do you do each day? 2   Over the past week, has your wound drainage been: Moderate "   Rate your HS overall from 0-10 (0 = no disease, 10 = worst) over the past week:  10   Rate your pain score from 0-10 (0 = no disease, 10 = worst) for the most painful/symptomatic lesion in the past week:  8   Over the past week, how much has HS influenced your quality of life? extremely     Labs Reviewed:  Reviewed prior labs    Physical Exam:  Vitals: There were no vitals taken for this visit.  SKIN: Focused examination of face, axillae, chest, abdomen, groin and upper legs was performed.  - Ill-demarcated thin pink scaly plaques on the central/lower abdomen     HS Exam  No flowsheet data found.    Left Axilla 3/25/2021   # of inflamed nodules 2   # of abcesses 0   # of draining tunnels 0   Erythema (grade) 2   Thickness (grade) 1   Open skin surface (grade) 0   Tunnels (grade) 0       No flowsheet data found.    Chest 3/25/2021   # of inflamed nodules 2   # of abcesses 0   # of draining tunnels 0   Erythema (grade) 1   Thickness (grade) 1   Open skin surface (grade) 0   Tunnels (grade) 0       Abdomen 3/25/2021   # of inflamed nodules 1   # of abcesses 0   # of draining tunnels 0   Erythema (grade) 1   Thickness (grade) 1   Open skin surface (grade) 0   Tunnels (grade) 0        Pubis/Genital 3/25/2021   # of inflamed nodules 1   # of abcesses 0   # of draining tunnels 0   Erythema (grade) 2   Thickness (grade) 1   Open skin surface (grade) 0   Tunnels (grade) 0       Left Thigh 3/25/2021   # of inflamed nodules 0   # of abcesses 0   # of draining tunnels 1   Erythema (grade) 1   Thickness (grade) 1   Open skin surface (grade) 1   Tunnels (grade) 1       Right Thigh 3/25/2021   # of inflamed nodules 1   # of abcesses 0   # of draining tunnels 0   Erythema (grade) 1   Thickness (grade) 1   Open skin surface (grade) 0   Tunnels (grade) 0       No flowsheet data found.    Buttocks 3/25/2021   Pilonidal Disease? No   Pioderma Gangrenosum? No   Cutaneous Crohn's? No       HS Data  HS Exam Data 3/25/2021   LC Type LC1    Clinical Subtypes Regular type   Acne? No   Dissecting Cellulitis? No   Visual analogue score (0-100) 45   Total Olivas Stage II   Total Inflammatory Nodules 7   Total Abcesses 0   Total Draining Tunnels 1   Total Abscess and Nodule Count 7   IHS4 Score  11   Total  HASI Score 19     - No other lesions of concern on areas examined.     Medications:  Current Outpatient Medications   Medication     clindamycin (CLEOCIN T) 1 % external lotion     doxycycline monohydrate (MONODOX) 100 MG capsule     No current facility-administered medications for this visit.       Past Medical History:   Patient Active Problem List   Diagnosis     Supervision of normal first pregnancy     Rh negative status during pregnancy     Vitamin D deficiency     History of MRSA infection     History of sexually transmitted disease     Need for Tdap vaccination     Rash and nonspecific skin eruption     Non-reassuring electronic fetal monitoring tracing     IUGR (intrauterine growth restriction) affecting care of mother     S/P  section     Morbid obesity (H)     History reviewed. No pertinent past medical history.    CC Maryann Dong MD  [address] [place] [address] on close of this encounter.

## 2021-03-25 NOTE — PATIENT INSTRUCTIONS
Hidradenitis suppurativa is an auto-inflammatory disease (not a bacterial infection). We also know hormones play a big role in HS too. Unfortunately there is no cure at this time, but various treatments that we can use to help control the disease. Some of these options include: antibiotics (like doxycycline), metformin (a medication that is also used for diabetes), spironolactone (this is a blood pressure medication that also helps with the hormonal affect of HS), and Humira which is an injectable medication. Other possible options include getting laser hair removal (this is sometimes covered by insurance). Finally, for extremely stubborn spots surgery to cut out the spot is an option; recurrence is still possible after these procedures.         Today we'll try to get you some good bandages that can help.     Keep up the great work in cutting down your smoking!      For the rash on your abdomen start using triamcinolone 0.1% ointment twice a day.     HIDRADENITIS SUPPURATIVA     What is hidradenitis suppurativa (HS)?  Hidradenitis suppurativa (HS) is a chronic skin disorder affecting the hair follicles. The disease starts with sore red lumps (or boils), typically involving the armpits, breasts, lower abdomen, groin, and buttocks. These lesions appear suddenly, increase in size and then burst or rupture, usually under the surface of the skin. This causes severe pain. Sometimes they rupture to the surface of the skin, draining pus. In some people, they can result in tunnels under the skin that may or may not continue to drain. When the lumps heal, they can leave scars.     Facts:     HS is a chronic skin disease, that comes and goes in flares, and is very painful    HS happens in many people - men and women, young and elderly, all races and ethnicities. But HS is more common in young adults, women  and skin of color    One out of three people with HS has a family member with HS     HS is NOT due to an infection or  washing habits    HS is NOT contagious, so it cannot be spread from one person to another person     HS is NOT caused by how well you wash or what soaps you use    HS is NOT caused by smoking or obesity, but quitting smoking and losing weight have helped some people        Causes  The cause of HS remains unclear. It is thought that there is a problem in the hair follicle that makes it weaker and easier to break open. The current theory is that there are three steps that cause an HS lesion to form::   1. The hair follicle gets plugged   2. The hair follicle swells and then ruptures, causing pain and inflammation   3. In some people, the inflammation does not stop and results in tunneling through the skin       Associated Conditions  HS is associated with several other medical conditions and activities including:    Tobacco use    High cholesterol, heart disease and stroke     Type 2 diabetes     Depression and anxiety     Arthritis, which causes stiffness and pain in joints     Inflammatory bowel disease (including Crohn's disease and ulcerative colitis)    Severe acne and pilonidal sinus/cyst    Polycystic ovarian syndrome    Skin cancer in areas of longstanding HS lesions, typically in the groin or buttocks (rare)    It is important to ask your physician to watch out for these conditions.      To help manage mental health issues related to HS and emotional support:    Help finding a mental health specialist: https://www.psychologytoday.com/us/therapists    Suicide prevention (24/7 free confidential support):   o Phone: (618) [phone]  o Website: https://suicidepreventionlifeline.org/      Support groups:  https://hopeforhs.org/    Intimacy support: American Association of Sexuality Educators, Counselors and Therapists (AASECT) website: https://www.aasect.org    For help quitting smoking     Phone:  o English: 1-800-QUIT-NOW (1-750-820-4964)  o English: 4-368-TXONMZ-YA (1-172.402.5609)  o     Website:   o English:  https://smokefree.gov/  o French: jackie.smokefree.gov     Lifestyle Modifications   Quitting smoking or weight loss can help your overall health and may help improve HS symptoms in some people, but not everyone.   It is recommended to eat a well-balanced, healthy diet (https://health.gov/dietaryguidelines) and to maintain a healthy weight. Avoiding dietary triggers can help some people suffering with HS, but will not necessarily help others with HS.    Some people may find that friction, irritation, and rubbing may worsen HS symptoms. Some people do better with loose, breathable clothing and fabrics. Shaving close to the affected areas may also worsen HS in some people. But, not everyone has the same triggers for their HS, so see what works for you!    Some medications may worsen HS such as lithium, testosterone, and some progesterone-only birth control.  Please discuss this with your provider before stopping medication.     Zinc supplementation has been shown in some studies to help HS. However, every treatment can have a side effects,  so talk to your provider before starting any treatment.     Treatment                    Medicines, procedures and surgeries are offered to treat HS. Treatment can help reduce breakouts and improve quality of life.  Medicines used:    Topical washes (e.g. chlorhexidine, benzoyl peroxide)      Clindamycin on the skin - seems effective for pustules and papules. Probably not helpful for larger chronic lesions.       Oral antibiotics (e.g. doxycycline, clindamycin + rifampin, dapsone) - antibiotics may help some, but not all patients      Hormonal therapies (e.g. spironolactone, oral contraceptives) - primarily used in females though to have a hormonal component to their HS (e.g perimenstrual flares, worsening in pregnancy, polycystic ovarian syndrome)      Immunosuppression (e.g. prednisone)       Injectables (e.g. adalimumab, infliximab) - Adalimumab is the only federal drug  administration (FDA) approved medication for HS    Procedures:    Intralesional steroid injections - may help areas of acute active inflammation       Some hair removal lasers - If considering this option, we recommend doing this only with a medical professional that has experience treating HS.     Surgeries:    Incision and drainage - Provides fast, short-term relief, but symptoms likely to recur.          Deroofing - This surgery involves opening the lesion and cleaning out the base. This treatment is effective for recurring lesions.  Recurrence rates seem to be similar to excision. These are typically left open and allowed to heal on their own over 1-3 months        Excision - This involves cutting out chronic lesions.  This may entail cutting out a large affected area referred to as wide local excision, or cutting out single lesions, referred to as localized excisions.  Excision may be done with a scalpel, electric heating device or laser (e.g. carbon dioxide [CO2] laser).  These are either allowed to heal on their own, closed with sutures, or closed with a graft or flap.  Grafts are typically done by taking skin from one site of the body and using it to close another part of the body.  Flaps are done by moving tissue around to close a wound.     Check out this website to help decide the best treatment options for you!     https://www.informed-decisions.org/hidradenitispda.php    Could consider tumeric (also known as tumeric) 500mg twice a day    [facility] - we will send the bandages here to mail to you   [address] [place] [address] [address] [place] [address]

## 2021-04-05 ENCOUNTER — TELEPHONE (OUTPATIENT)
Dept: DERMATOLOGY | Facility: CLINIC | Age: 31
End: 2021-04-05

## 2021-04-05 DIAGNOSIS — L93.2 CUTANEOUS LUPUS ERYTHEMATOSUS: Primary | ICD-10-CM

## 2021-04-06 NOTE — TELEPHONE ENCOUNTER
Central Prior Authorization Team   Phone: [phone]      PA Initiation    Medication: desonide (DESOWEN) 0.05 % external cream-PA initiated  Insurance Company: [employer] - Phone [phone] Fax [phone]  Pharmacy Filling the Rx: [id number] - [place], [place] - [address]  Filling Pharmacy Phone: [phone]  Filling Pharmacy Fax:    Start Date: 4/6/2021

## 2021-04-07 NOTE — TELEPHONE ENCOUNTER
PRIOR AUTHORIZATION DENIED-Per voicemail from Angus at [facility]     Medication: desonide (DESOWEN) 0.05 % external cream-PA denied    Denial Date: 4/7/2021    Denial Rational: must try/fail fluocinolone or alclometasone (neither require PA)

## 2021-04-08 RX ORDER — HYDROCORTISONE VALERATE CREAM 2 MG/G
CREAM TOPICAL
Qty: 45 G | Refills: 3 | Status: SHIPPED | OUTPATIENT
Start: 2021-04-08 | End: 2022-02-11

## 2021-05-10 ENCOUNTER — TELEPHONE (OUTPATIENT)
Dept: DERMATOLOGY | Facility: CLINIC | Age: 31
End: 2021-05-10

## 2021-05-10 DIAGNOSIS — L23.0 ALLERGIC CONTACT DERMATITIS DUE TO METALS: Primary | ICD-10-CM

## 2021-05-10 NOTE — TELEPHONE ENCOUNTER
Central Prior Authorization Team   Phone: [phone]      PA Initiation    Medication: hydrocortisone (WESTCORT) 0.2 % external cream  Insurance Company: [employer] - Phone [phone] Fax [phone]  Pharmacy Filling the Rx: [id number] - [place], [place] - [address]  Filling Pharmacy Phone: [phone]  Filling Pharmacy Fax:    Start Date: 5/10/2021

## 2021-05-11 NOTE — TELEPHONE ENCOUNTER
PRIOR AUTHORIZATION DENIED    Medication: hydrocortisone (WESTCORT) 0.2 % external cream    Denial Date: 5/11/2021    Denial Rational: This medication is not a covered item. Two items that are covered are either Alclometasone (Aclovate) cream or ointment or regular OTC Hydrocortisone.   Link to [employer] formulary - [email or web address]    Appeal Information: [employer] - Phone [phone] Fax [phone]

## 2021-05-17 RX ORDER — ALCLOMETASONE DIPROPIONATE 0.5 MG/G
CREAM TOPICAL
Qty: 60 G | Refills: 0 | Status: SHIPPED | OUTPATIENT
Start: 2021-05-17 | End: 2022-02-11

## 2021-05-17 NOTE — TELEPHONE ENCOUNTER
Prescription sent for alclometasone cream as PA for Westcort and desonide were denied.    Staffed with Dr. Potter.    Alyson Napier MD  PGY-4, Dermatology

## 2021-06-09 ENCOUNTER — TELEPHONE (OUTPATIENT)
Dept: DERMATOLOGY | Facility: CLINIC | Age: 31
End: 2021-06-09

## 2021-06-09 NOTE — TELEPHONE ENCOUNTER
Prior Authorization Retail Medication Request    Medication/Dose: hydrocortisone (WESTCORT) 0.2 % external cream  ICD code (if different than what is on RX):  L93.2  Previously Tried and Failed:  See provider note  Rationale:  Apply to rash on face twice a day    Insurance Name:  [employer]  Insurance ID:  [id number]

## 2021-06-12 NOTE — PROVIDER NOTIFICATION
06/29/20 0715   Provider Notification   Provider Name/Title Dr. Mckeon   Method of Notification Electronic Page   Request Evaluate - Remote   Notification Reason Status Update     Requested alternate medication for HA. Notified of ear pain with possible bloody discharge from ear.   No

## 2021-09-19 ENCOUNTER — HEALTH MAINTENANCE LETTER (OUTPATIENT)
Age: 31
End: 2021-09-19

## 2022-02-11 ENCOUNTER — OFFICE VISIT (OUTPATIENT)
Dept: FAMILY MEDICINE | Facility: CLINIC | Age: 32
End: 2022-02-11
Payer: COMMERCIAL

## 2022-02-11 VITALS
SYSTOLIC BLOOD PRESSURE: 124 MMHG | DIASTOLIC BLOOD PRESSURE: 64 MMHG | HEART RATE: 83 BPM | WEIGHT: 279.1 LBS | HEIGHT: 69 IN | OXYGEN SATURATION: 96 % | BODY MASS INDEX: 41.34 KG/M2

## 2022-02-11 DIAGNOSIS — M54.6 CHRONIC BILATERAL THORACIC BACK PAIN: ICD-10-CM

## 2022-02-11 DIAGNOSIS — Z13.1 SCREENING FOR DIABETES MELLITUS: ICD-10-CM

## 2022-02-11 DIAGNOSIS — D23.21 DERMOID CYST OF RIGHT EAR: ICD-10-CM

## 2022-02-11 DIAGNOSIS — L73.2 HIDRADENITIS SUPPURATIVA: ICD-10-CM

## 2022-02-11 DIAGNOSIS — Z00.00 ROUTINE GENERAL MEDICAL EXAMINATION AT A HEALTH CARE FACILITY: Primary | ICD-10-CM

## 2022-02-11 DIAGNOSIS — L30.9 DERMATITIS: ICD-10-CM

## 2022-02-11 DIAGNOSIS — N91.2 AMENORRHEA: ICD-10-CM

## 2022-02-11 DIAGNOSIS — Z13.0 SCREENING FOR DEFICIENCY ANEMIA: ICD-10-CM

## 2022-02-11 DIAGNOSIS — Z11.3 ROUTINE SCREENING FOR STI (SEXUALLY TRANSMITTED INFECTION): ICD-10-CM

## 2022-02-11 DIAGNOSIS — G89.29 CHRONIC BILATERAL THORACIC BACK PAIN: ICD-10-CM

## 2022-02-11 DIAGNOSIS — Z11.59 ENCOUNTER FOR HEPATITIS C SCREENING TEST FOR LOW RISK PATIENT: ICD-10-CM

## 2022-02-11 DIAGNOSIS — Z13.220 LIPID SCREENING: ICD-10-CM

## 2022-02-11 DIAGNOSIS — E66.01 MORBID OBESITY (H): ICD-10-CM

## 2022-02-11 DIAGNOSIS — E55.9 VITAMIN D DEFICIENCY: ICD-10-CM

## 2022-02-11 LAB
ALBUMIN SERPL-MCNC: 3.6 G/DL (ref 3.5–5)
ALP SERPL-CCNC: 85 U/L (ref 45–120)
ALT SERPL W P-5'-P-CCNC: 13 U/L (ref 0–45)
ANION GAP SERPL CALCULATED.3IONS-SCNC: 7 MMOL/L (ref 5–18)
AST SERPL W P-5'-P-CCNC: 10 U/L (ref 0–40)
BILIRUB SERPL-MCNC: 0.2 MG/DL (ref 0–1)
BUN SERPL-MCNC: 8 MG/DL (ref 8–22)
CALCIUM SERPL-MCNC: 9.4 MG/DL (ref 8.5–10.5)
CHLORIDE BLD-SCNC: 107 MMOL/L (ref 98–107)
CHOLEST SERPL-MCNC: 215 MG/DL
CO2 SERPL-SCNC: 25 MMOL/L (ref 22–31)
CREAT SERPL-MCNC: 0.76 MG/DL (ref 0.6–1.1)
ERYTHROCYTE [DISTWIDTH] IN BLOOD BY AUTOMATED COUNT: 14 % (ref 10–15)
FASTING STATUS PATIENT QL REPORTED: ABNORMAL
GFR SERPL CREATININE-BSD FRML MDRD: >90 ML/MIN/1.73M2
GLUCOSE BLD-MCNC: 84 MG/DL (ref 70–125)
HBA1C MFR BLD: 5.4 % (ref 0–5.6)
HCG UR QL: NEGATIVE
HCT VFR BLD AUTO: 40.5 % (ref 35–47)
HDLC SERPL-MCNC: 34 MG/DL
HGB BLD-MCNC: 13.1 G/DL (ref 11.7–15.7)
HIV 1+2 AB+HIV1 P24 AG SERPL QL IA: NEGATIVE
LDLC SERPL CALC-MCNC: 154 MG/DL
MCH RBC QN AUTO: 28.9 PG (ref 26.5–33)
MCHC RBC AUTO-ENTMCNC: 32.3 G/DL (ref 31.5–36.5)
MCV RBC AUTO: 89 FL (ref 78–100)
PLATELET # BLD AUTO: 263 10E3/UL (ref 150–450)
POTASSIUM BLD-SCNC: 4 MMOL/L (ref 3.5–5)
PROT SERPL-MCNC: 7.3 G/DL (ref 6–8)
RBC # BLD AUTO: 4.54 10E6/UL (ref 3.8–5.2)
SODIUM SERPL-SCNC: 139 MMOL/L (ref 136–145)
TRIGL SERPL-MCNC: 133 MG/DL
WBC # BLD AUTO: 7 10E3/UL (ref 4–11)

## 2022-02-11 PROCEDURE — 99213 OFFICE O/P EST LOW 20 MIN: CPT | Mod: 25 | Performed by: FAMILY MEDICINE

## 2022-02-11 PROCEDURE — 81025 URINE PREGNANCY TEST: CPT | Performed by: FAMILY MEDICINE

## 2022-02-11 PROCEDURE — 80061 LIPID PANEL: CPT | Performed by: FAMILY MEDICINE

## 2022-02-11 PROCEDURE — 87591 N.GONORRHOEAE DNA AMP PROB: CPT | Performed by: FAMILY MEDICINE

## 2022-02-11 PROCEDURE — 87389 HIV-1 AG W/HIV-1&-2 AB AG IA: CPT | Performed by: FAMILY MEDICINE

## 2022-02-11 PROCEDURE — 82306 VITAMIN D 25 HYDROXY: CPT | Performed by: FAMILY MEDICINE

## 2022-02-11 PROCEDURE — 99395 PREV VISIT EST AGE 18-39: CPT | Performed by: FAMILY MEDICINE

## 2022-02-11 PROCEDURE — 36415 COLL VENOUS BLD VENIPUNCTURE: CPT | Performed by: FAMILY MEDICINE

## 2022-02-11 PROCEDURE — 87491 CHLMYD TRACH DNA AMP PROBE: CPT | Performed by: FAMILY MEDICINE

## 2022-02-11 PROCEDURE — 83036 HEMOGLOBIN GLYCOSYLATED A1C: CPT | Performed by: FAMILY MEDICINE

## 2022-02-11 PROCEDURE — 86803 HEPATITIS C AB TEST: CPT | Performed by: FAMILY MEDICINE

## 2022-02-11 PROCEDURE — 86780 TREPONEMA PALLIDUM: CPT | Performed by: FAMILY MEDICINE

## 2022-02-11 PROCEDURE — 85027 COMPLETE CBC AUTOMATED: CPT | Performed by: FAMILY MEDICINE

## 2022-02-11 PROCEDURE — 80053 COMPREHEN METABOLIC PANEL: CPT | Performed by: FAMILY MEDICINE

## 2022-02-11 RX ORDER — TRIAMCINOLONE ACETONIDE 1 MG/G
CREAM TOPICAL 2 TIMES DAILY PRN
Qty: 453.6 G | Refills: 1 | Status: SHIPPED | OUTPATIENT
Start: 2022-02-11

## 2022-02-11 ASSESSMENT — MIFFLIN-ST. JEOR: SCORE: 2047.49

## 2022-02-11 NOTE — PROGRESS NOTES
SUBJECTIVE:   CC: Shanda Manrique is an 31 year old woman who presents for preventive health visit.       Patient has been advised of split billing requirements and indicates understanding: Yes  Healthy Habits:     Getting at least 3 servings of Calcium per day:  Yes    Bi-annual eye exam:  Yes    Dental care twice a year:  NO    Sleep apnea or symptoms of sleep apnea:  None    Diet:  Regular (no restrictions)    Frequency of exercise:  None    Taking medications regularly:  Yes    PHQ-2 Total Score: 0    Additional concerns today:  No          PROBLEMS TO ADD ON...  Patient has numerous issues that she wants addressed today.  Must have a pregnancy test  Screening for sexually transmitted infections  Recommendations on a chiropractor  Lumps in ear right side  Rash on her lower abdomen that persists  Pain in her thoracic spine and shoulder blade area    Today's PHQ-2 Score:   PHQ-2 ( 1999 Pfizer) 2/11/2022   Q1: Little interest or pleasure in doing things 0   Q2: Feeling down, depressed or hopeless 0   PHQ-2 Score 0   PHQ-2 Total Score (12-17 Years)- Positive if 3 or more points; Administer PHQ-A if positive -   Q1: Little interest or pleasure in doing things Not at all   Q2: Feeling down, depressed or hopeless Not at all   PHQ-2 Score 0       Abuse: Current or Past (Physical, Sexual or Emotional) - No  Do you feel safe in your environment? Yes    Have you ever done Advance Care Planning? (For example, a Health Directive, POLST, or a discussion with a medical provider or your loved ones about your wishes): No, advance care planning information given to patient to review.  Patient declined advance care planning discussion at this time.    Social History     Tobacco Use     Smoking status: Current Every Day Smoker     Types: Cigarettes     Smokeless tobacco: Never Used   Substance Use Topics     Alcohol use: Not Currently     If you drink alcohol do you typically have >3 drinks per day or >7 drinks per week?  No    Alcohol Use 2022   Prescreen: >3 drinks/day or >7 drinks/week? Not Applicable   Prescreen: >3 drinks/day or >7 drinks/week? -   No flowsheet data found.    Reviewed orders with patient.  Reviewed health maintenance and updated orders accordingly - Yes  Lab work is in process  Labs reviewed in EPIC  BP Readings from Last 3 Encounters:   22 124/64   21 105/70   20 130/88    Wt Readings from Last 3 Encounters:   22 126.6 kg (279 lb 1.6 oz)   20 128.4 kg (283 lb)   20 122 kg (269 lb)                  Patient Active Problem List   Diagnosis     Supervision of normal first pregnancy     Rh negative status during pregnancy     Vitamin D deficiency     History of MRSA infection     History of sexually transmitted disease     Need for Tdap vaccination     Rash and nonspecific skin eruption     Non-reassuring electronic fetal monitoring tracing     IUGR (intrauterine growth restriction) affecting care of mother     S/P  section     Morbid obesity (H)     Past Surgical History:   Procedure Laterality Date      SECTION N/A 2020    Procedure:  SECTION;  Surgeon: Mago Dumont MD;  Location: [facility]     CHALAZION EXCISION         Social History     Tobacco Use     Smoking status: Current Every Day Smoker     Types: Cigarettes     Smokeless tobacco: Never Used   Substance Use Topics     Alcohol use: Not Currently     Family History   Problem Relation Age of Onset     Diabetes Maternal Uncle      Breast Cancer Maternal Aunt      Glaucoma No family hx of      Macular Degeneration No family hx of      Melanoma No family hx of      Skin Cancer No family hx of          Current Outpatient Medications   Medication Sig Dispense Refill     triamcinolone (KENALOG) 0.1 % external cream Apply topically 2 times daily as needed for irritation 453.6 g 1     Allergies   Allergen Reactions     Codeine Hives     PN: LW Reaction: HIVES     Morphine Hives     Tramadol Hives      PN: LW Reaction: HIVES         Breast Cancer Screening:    Breast CA Risk Assessment (FHS-7) 2022   Do you have a family history of breast, colon, or ovarian cancer? No / Unknown       click delete button to remove this line now  Patient under 40 years of age: Routine Mammogram Screening not recommended.   Pertinent mammograms are reviewed under the imaging tab.    History of abnormal Pap smear: NO - age 21-29 PAP every 3 years recommended  PAP / HPV 2020   PAP (Historical) NIL     Reviewed and updated as needed this visit by clinical staff  Tobacco  Allergies  Meds             Reviewed and updated as needed this visit by Provider               No past medical history on file.   Past Surgical History:   Procedure Laterality Date      SECTION N/A 2020    Procedure:  SECTION;  Surgeon: Mago Dumont MD;  Location: [facility]     CHALAZION EXCISION       OB History    Para Term  AB Living   1 1 0 1 0 1   SAB IAB Ectopic Multiple Live Births   0 0 0 0 1      # Outcome Date GA Lbr Lucius/2nd Weight Sex Delivery Anes PTL Lv   1  20 29w3d   M CS-LTranv Spinal N ELIZABETH      Complications: Fetal Intolerance, Pre-eclampsia, severe, IUGR (intrauterine growth retardation) affecting mother      Name: Real      Apgar1: 6  Apgar5: 9       Review of Systems  CONSTITUTIONAL: NEGATIVE for fever, chills, change in weight  INTEGUMENTARY/SKIN: The patient has a cyst in front of her right ear as well as a lump in the earlobe on the right side.  Patient has a history of hidradenitis supprative  EYES: NEGATIVE for vision changes or irritation  ENT: NEGATIVE for ear, mouth and throat problems  ENT: Right ear lumps  RESP: NEGATIVE for significant cough or SOB  BREAST: NEGATIVE for masses, tenderness or discharge  CV: NEGATIVE for chest pain, palpitations or peripheral edema  GI: NEGATIVE for nausea, abdominal pain, heartburn, or change in bowel habits  : NEGATIVE for unusual urinary  "or vaginal symptoms. Periods are regular.  MUSCULOSKELETAL: Upper thoracic pain around the shoulder blades and upper back  NEURO: NEGATIVE for weakness, dizziness or paresthesias  PSYCHIATRIC: NEGATIVE for changes in mood or affect     OBJECTIVE:   /64 (BP Location: Right arm, Patient Position: Sitting, Cuff Size: Adult Regular)   Pulse 83   Ht 1.74 m (5' 8.5\")   Wt 126.6 kg (279 lb 1.6 oz)   LMP 02/03/2022 (Approximate)   SpO2 96%   Breastfeeding No   BMI 41.81 kg/m    Physical Exam  GENERAL: healthy, alert and no distress  EYES: Eyes grossly normal to inspection, PERRL and conjunctivae and sclerae normal  HENT: ear canals and TM's normal, nose and mouth without ulcers or lesions  HENT: normal cephalic/atraumatic, ear canals and TM's normal, nose and mouth without ulcers or lesions, oropharynx clear, oral mucous membranes moist and right earlobe 1 cm soft mass.  Preauricular area right side sinus present with lumps that is 8 mm in diameter but no active redness or drainage.  NECK: no adenopathy, no asymmetry, masses, or scars and thyroid normal to palpation  RESP: lungs clear to auscultation - no rales, rhonchi or wheezes  BREAST: normal without masses, tenderness or nipple discharge and no palpable axillary masses or adenopathy  CV: regular rate and rhythm, normal S1 S2, no S3 or S4, no murmur, click or rub, no peripheral edema and peripheral pulses strong  ABDOMEN: soft, nontender, no hepatosplenomegaly, no masses and bowel sounds normal  MS: no gross musculoskeletal defects noted, no edema  SKIN: Large area of hyperpigmentation over the lower abdomen with some thickening of the skin consistent with a chronic dermatitis etiology unclear.  Previous dermatologist thought it was a nickel allergy but this involves a very large area and not just where the snap on her pants would be present.  Also has some pigment changes on her face both hypopigmentation and hyperpigmentation.  NEURO: Normal strength and " tone, mentation intact and speech normal  PSYCH: mentation appears normal, affect normal/bright    Diagnostic Test Results:  Labs reviewed in Epic  Results for orders placed or performed in visit on 02/11/22   Hepatitis C Screen Reflex to HCV RNA Quant and Genotype     Status: Normal   Result Value Ref Range    Hepatitis C Antibody Nonreactive Nonreactive    Narrative    Assay performance characteristics have not been established for newborns, infants, and children.   Comprehensive metabolic panel     Status: Normal   Result Value Ref Range    Sodium 139 136 - 145 mmol/L    Potassium 4.0 3.5 - 5.0 mmol/L    Chloride 107 98 - 107 mmol/L    Carbon Dioxide (CO2) 25 22 - 31 mmol/L    Anion Gap 7 5 - 18 mmol/L    Urea Nitrogen 8 8 - 22 mg/dL    Creatinine 0.76 0.60 - 1.10 mg/dL    Calcium 9.4 8.5 - 10.5 mg/dL    Glucose 84 70 - 125 mg/dL    Alkaline Phosphatase 85 45 - 120 U/L    AST 10 0 - 40 U/L    ALT 13 0 - 45 U/L    Protein Total 7.3 6.0 - 8.0 g/dL    Albumin 3.6 3.5 - 5.0 g/dL    Bilirubin Total 0.2 0.0 - 1.0 mg/dL    GFR Estimate >90 >60 mL/min/1.73m2   Hemoglobin A1c     Status: Normal   Result Value Ref Range    Hemoglobin A1C 5.4 0.0 - 5.6 %   Lipid panel reflex to direct LDL Fasting     Status: Abnormal   Result Value Ref Range    Cholesterol 215 (H) <=199 mg/dL    Triglycerides 133 <=149 mg/dL    Direct Measure HDL 34 (L) >=50 mg/dL    LDL Cholesterol Calculated 154 (H) <=129 mg/dL    Patient Fasting > 8hrs? Unknown    CBC with platelets     Status: Normal   Result Value Ref Range    WBC Count 7.0 4.0 - 11.0 10e3/uL    RBC Count 4.54 3.80 - 5.20 10e6/uL    Hemoglobin 13.1 11.7 - 15.7 g/dL    Hematocrit 40.5 35.0 - 47.0 %    MCV 89 78 - 100 fL    MCH 28.9 26.5 - 33.0 pg    MCHC 32.3 31.5 - 36.5 g/dL    RDW 14.0 10.0 - 15.0 %    Platelet Count 263 150 - 450 10e3/uL   HCG qualitative urine     Status: Normal   Result Value Ref Range    hCG Urine Qualitative Negative Negative   HIV Antigen Antibody Combo      Status: Normal   Result Value Ref Range    HIV Antigen Antibody Combo Negative Negative   Treponema Abs w Reflex to RPR and Titer     Status: Normal   Result Value Ref Range    Treponema Antibody Total Nonreactive Nonreactive   NEISSERIA GONORRHOEA PCR     Status: Normal    Specimen: Urine, Voided   Result Value Ref Range    Neisseria gonorrhoeae Negative Negative   CHLAMYDIA TRACHOMATIS PCR     Status: Normal    Specimen: Urine, Voided   Result Value Ref Range    Chlamydia trachomatis Negative Negative       ASSESSMENT/PLAN:   Shanda was seen today for physical, derm problem, mass and referral.    Diagnoses and all orders for this visit:    Routine general medical examination at a health care facility  Encouraged COVID-19 vaccination, influenza vaccination and Tdap but declined  GYN exam with Pap smear for cytology and HPV testing August 2023  Note history of MRSA  Positive Covid test January 25, 2021      Morbid obesity (H)  -     Hemoglobin A1c, 5.4 which is normal    Dermoid cyst of right ear  -     Adult Dermatology Referral; Future, referral for the cyst in front of the right ear and also involving the earlobe right side    Vitamin D deficiency  -     Vitamin D deficiency screening, pending    Dermatitis  -     triamcinolone (KENALOG) 0.1 % external cream; Apply topically 2 times daily as needed for irritation  Use a good moisturizing cream such as Cetaphil or CeraVe along with the steroid.  Caution to not use steroid cream on face    Screening for deficiency anemia  -     CBC with platelets, hemoglobin 13.1 with normal white blood cells and platelets and no evidence for anemia    Lipid screening  -     Lipid panel reflex to direct LDL Fasting, 215/133/34/154  Moderate elevation of lipids particularly the LDL  Lifestyle and diet modification    Screening for diabetes mellitus  -     Comprehensive metabolic panel, blood glucose 84 with GFR greater than 90 and otherwise normal liver function tests and  "electrolytes with no signs of diabetes    Encounter for hepatitis C screening test for low risk patient  -     Hepatitis C Screen Reflex to HCV RNA Quant and Genotype, negative    Routine screening for STI (sexually transmitted infection)  -     NEISSERIA GONORRHOEA PCR, negative    -     CHLAMYDIA TRACHOMATIS PCR, negative    -     HIV Antigen Antibody Combo, negative    -     Treponema Abs w Reflex to RPR and Titer, negative    Amenorrhea  -     HCG qualitative urine, negative    Hidradenitis suppurativa  The patient had been given a number of products by Dr. Hawkins dermatologist with  [facility] and she is not using any of those right now and feels that the hidradenitis has been fairly well controlled    Chronic bilateral thoracic back pain  Recommendation for seeking care at [facility] for chiropractic care      Patient has been advised of split billing requirements and indicates understanding: Yes    COUNSELING:  Reviewed preventive health counseling, as reflected in patient instructions       Regular exercise       Healthy diet/nutrition       Contraception       Safe sex practices/STD prevention       Consider Hep C screening for all patients one time for ages 18-79 years       Patient is not interested in immunizations including COVID-19 vaccination, influenza vaccination or tetanus booster    Estimated body mass index is 41.81 kg/m  as calculated from the following:    Height as of this encounter: 1.74 m (5' 8.5\").    Weight as of this encounter: 126.6 kg (279 lb 1.6 oz).    Weight management plan: Discussed healthy diet and exercise guidelines    She reports that she has been smoking cigarettes. She has never used smokeless tobacco.  Tobacco Cessation Action Plan:   Information offered: Patient not interested at this time    Test results per Forrest  Recommendation for chiropractic care at [facility] with Dr. Kai Tiwari  Due for another GYN exam with " cytology and HPV testing in August 2023    Orestes Diego MD  [facility]

## 2022-02-12 LAB
C TRACH DNA SPEC QL NAA+PROBE: NEGATIVE
N GONORRHOEA DNA SPEC QL NAA+PROBE: NEGATIVE
T PALLIDUM AB SER QL: NONREACTIVE

## 2022-02-13 LAB — HCV AB SERPL QL IA: NONREACTIVE

## 2022-02-13 NOTE — RESULT ENCOUNTER NOTE
Shanda,    It was nice to have the opportunity to meet you in the clinic this past week.  Although one of your test results are back and I like to go over those results.    The screening for gonorrhea, chlamydia, syphilis, hepatitis C, and HIV all came back as being negative.  That is good news.    The pregnancy test was negative.    The metabolic panel showed no evidence for diabetes with a blood glucose of 84 and normal kidney and liver function.  The hemoglobin A1c also is a screening test for diabetes which was 5.4% which is normal.    The CBC shows no signs of anemia with a hemoglobin of 13.1.    Your lipids are moderately elevated with a total cholesterol of 215 and it should be less than 200 and the LDL is the bad cholesterol and that should be less than 130 and was 154.  The HDL cholesterol is the good cholesterol and your value of 34 is on the low side.  Try to eat less fat fried foods and less saturated fats will help the cholesterol numbers.  More vegetables and whole grains and less simple sugars and simple carbohydrates such as white bread, potatoes and rice can be helpful. Keep as physically active as possible.  I do not feel that we need to start any medication for your cholesterol.    The only test that is pending is the vitamin D level and I will send you another message when I have that.    I also failed to give you the name of the chiropractor clinic that I would suggest which is the [facility].    If you are having trouble getting your appointment with the dermatologist for your right ear please let me know.    For the rash on the abdomen along with using the triamcinolone cream I would use a good moisturizing cream such as Cetaphil or CeraVe.  It is best to put the triamcinolone cream on first and then either at a different time or after the triamcinolone cream use the moisturizing cream such as Cetaphil or CeraVe.    If I can help in any other way please let me  know.    Best regards,Dr. Diego

## 2022-02-14 DIAGNOSIS — E55.9 VITAMIN D DEFICIENCY: Primary | ICD-10-CM

## 2022-02-14 LAB — DEPRECATED CALCIDIOL+CALCIFEROL SERPL-MC: 9 UG/L (ref 30–80)

## 2022-02-14 RX ORDER — ERGOCALCIFEROL 1.25 MG/1
50000 CAPSULE, LIQUID FILLED ORAL WEEKLY
Qty: 12 CAPSULE | Refills: 3 | Status: SHIPPED | OUTPATIENT
Start: 2022-02-14

## 2022-02-14 NOTE — RESULT ENCOUNTER NOTE
Shanda,    The vitamin D test is back now and its very low at a value of 9 and it should be above 30.  I sent a prescription for vitamin D2 50,000 international units to be taken once weekly.  I put refills for a year.  We may be able to decrease that in about 6 months.  It is important for you to take vitamin D for your bone health.  It also can have some benefit for your immune system. Do not take more than 1 tablet/week.    Thank you,Dr. Diego

## 2022-06-09 ENCOUNTER — OFFICE VISIT (OUTPATIENT)
Dept: PODIATRY | Facility: CLINIC | Age: 32
End: 2022-06-09
Payer: COMMERCIAL

## 2022-06-09 VITALS — BODY MASS INDEX: 41.32 KG/M2 | HEIGHT: 69 IN | WEIGHT: 279 LBS | OXYGEN SATURATION: 99 % | HEART RATE: 107 BPM

## 2022-06-09 DIAGNOSIS — B35.1 NAIL FUNGUS: Primary | ICD-10-CM

## 2022-06-09 DIAGNOSIS — L60.0 INGROWN TOENAIL: ICD-10-CM

## 2022-06-09 PROCEDURE — 99203 OFFICE O/P NEW LOW 30 MIN: CPT | Mod: 25 | Performed by: PODIATRIST

## 2022-06-09 PROCEDURE — 11750 EXCISION NAIL&NAIL MATRIX: CPT | Mod: TA | Performed by: PODIATRIST

## 2022-06-09 RX ORDER — LIDOCAINE HYDROCHLORIDE 20 MG/ML
5 INJECTION, SOLUTION INFILTRATION; PERINEURAL ONCE
Status: COMPLETED | OUTPATIENT
Start: 2022-06-09 | End: 2022-06-09

## 2022-06-09 RX ADMIN — LIDOCAINE HYDROCHLORIDE 5 ML: 20 INJECTION, SOLUTION INFILTRATION; PERINEURAL at 11:43

## 2022-06-09 RX ADMIN — LIDOCAINE HYDROCHLORIDE 5 ML: 20 INJECTION, SOLUTION INFILTRATION; PERINEURAL at 11:44

## 2022-06-09 ASSESSMENT — PAIN SCALES - GENERAL: PAINLEVEL: NO PAIN (0)

## 2022-06-09 NOTE — PROGRESS NOTES
FOOT AND ANKLE SURGERY/PODIATRY CONSULT NOTE         ASSESSMENT:   Onychomycosis bilateral feet  Ingrown toenails both great toes great toenails      TREATMENT:  Performed total nail excision and matrixectomy of both great toenails today under local anesthesia of 2% lidocaine plain via the phenol and alcohol technique.  The patient tolerated the procedures and anesthesia well and was discharged in good condition.  She was given both written and verbal postoperative instructions.  She is to return to the clinic as needed.        HPI: Shanda Manrique presented to the clinic today complaining of thick, discolored, dystrophic nails 1 through 5 both feet.  The patient indicated that she has had a problem with her toenails for several years.  The nails are difficult to trim due to the thickness of the toenails.  She stated that both great toenails are painful.  Remaining nails do not cause any discomfort.  She has been offered oral antifungal treatment in the past but refused because of the potential side effects.  The patient has not had any redness, swelling, drainage or bleeding.  She is able to wear shoes fairly comfortably.  She denies any trauma to her feet.  The pain involving both great toenails is moderate to severe.  It is an aching type pain which is only relieved after removing her shoe gear.    History reviewed. No pertinent past medical history.    Social History     Socioeconomic History     Marital status: Single     Spouse name: Not on file     Number of children: Not on file     Years of education: Not on file     Highest education level: Not on file   Occupational History     Not on file   Tobacco Use     Smoking status: Current Every Day Smoker     Types: Cigarettes     Smokeless tobacco: Never Used   Substance and Sexual Activity     Alcohol use: Not Currently     Drug use: Not Currently     Types: Marijuana     Comment: once in awhile     Sexual activity: Yes     Partners: Male   Other Topics Concern      Parent/sibling w/ CABG, MI or angioplasty before 65F 55M? Not Asked   Social History Narrative     Not on file     Social Determinants of Health     Financial Resource Strain: Not on file   Food Insecurity: Not on file   Transportation Needs: Not on file   Physical Activity: Not on file   Stress: Not on file   Social Connections: Not on file   Intimate Partner Violence: Not on file   Housing Stability: Not on file          Allergies   Allergen Reactions     Codeine Hives     PN: LW Reaction: HIVES     Morphine Hives     Tramadol Hives     PN: LW Reaction: HIVES            Current Outpatient Medications:      triamcinolone (KENALOG) 0.1 % external cream, Apply topically 2 times daily as needed for irritation, Disp: 453.6 g, Rfl: 1     vitamin D2 (ERGOCALCIFEROL) 00263 units (1250 mcg) capsule, Take 1 capsule (50,000 Units) by mouth once a week, Disp: 12 capsule, Rfl: 3     Family History   Problem Relation Age of Onset     Diabetes Maternal Uncle      Breast Cancer Maternal Aunt      Glaucoma No family hx of      Macular Degeneration No family hx of      Melanoma No family hx of      Skin Cancer No family hx of         Social History     Socioeconomic History     Marital status: Single     Spouse name: Not on file     Number of children: Not on file     Years of education: Not on file     Highest education level: Not on file   Occupational History     Not on file   Tobacco Use     Smoking status: Current Every Day Smoker     Types: Cigarettes     Smokeless tobacco: Never Used   Substance and Sexual Activity     Alcohol use: Not Currently     Drug use: Not Currently     Types: Marijuana     Comment: once in awhile     Sexual activity: Yes     Partners: Male   Other Topics Concern     Parent/sibling w/ CABG, MI or angioplasty before 65F 55M? Not Asked   Social History Narrative     Not on file     Social Determinants of Health     Financial Resource Strain: Not on file   Food Insecurity: Not on file  "  Transportation Needs: Not on file   Physical Activity: Not on file   Stress: Not on file   Social Connections: Not on file   Intimate Partner Violence: Not on file   Housing Stability: Not on file        Review of Systems - Patient denies fever, chills, rash, wound, stiffness, limping, numbness, weakness, heart burn, blood in stool, chest pain with activity, calf pain when walking, shortness of breath with activity, chronic cough, easy bleeding/bruising, swelling of ankles, excessive thirst, fatigue, depression, anxiety.  Patient admits to painful thick toenails both great toes and thick discolored toenails 1 through 5 both feet.      OBJECTIVE:  Appearance: alert, well appearing, and in no distress.    Pulse 107   Ht 1.74 m (5' 8.5\")   Wt 126.6 kg (279 lb)   SpO2 99%   BMI 41.80 kg/m       Body mass index is 41.8 kg/m .     General appearance: Patient is alert and fully cooperative with history & exam.  No sign of distress is noted during the visit.  Psychiatric: Affect is pleasant & appropriate.  Patient appears motivated to improve health.  Respiratory: Breathing is regular & unlabored while sitting.  HEENT: Hearing is intact to spoken word.  Speech is clear.  No gross evidence of visual impairment that would impact ambulation.    Vascular: Dorsalis pedis and posterior tibial pulses are palpable. There is pedal hair growth bilaterally.  CFT < 3 sec from anterior tibial surface to distal digits bilaterally. There is no appreciable edema noted.  Dermatologic: Thick, discolored, dystrophic nails 1 through 5 both feet.  All nails are 2-3 times normal thickness.  Both great toenails are severely incurvated on the medial and lateral borders.  Turgor and texture are within normal limits. No coloration or temperature changes. No primary or secondary lesions noted.  Neurologic: All epicritic and proprioceptive sensations are grossly intact bilaterally.  Musculoskeletal: All active and passive ankle, subtalar, " midtarsal, and 1st MPJ range of motion are grossly intact without pain or crepitus, with the exception of none. Manual muscle strength is within normal limits bilaterally. All dorsiflexors, plantarflexors, invertors, evertors are intact bilaterally. Tenderness present to both great toenails on palpation.  No tenderness to both great toes with range of motion. Calf is soft/non-tender without warmth/induration    Imaging:       No images are attached to the encounter or orders placed in the encounter.     No results found.   No results found.       Eric Bentley; JENNYFER  [facility] Foot & Ankle Surgery/Podiatry

## 2022-06-09 NOTE — LETTER
6/9/2022         RE: Shnada Manrique  [address]  [place] [address]        Dear Colleague,    Thank you for referring your patient, Shanda Manrique, to the [facility]. Please see a copy of my visit note below.    FOOT AND ANKLE SURGERY/PODIATRY CONSULT NOTE         ASSESSMENT:   Onychomycosis bilateral feet  Ingrown toenails both great toes great toenails      TREATMENT:  Performed total nail excision and matrixectomy of both great toenails today under local anesthesia of 2% lidocaine plain via the phenol and alcohol technique.  The patient tolerated the procedures and anesthesia well and was discharged in good condition.  She was given both written and verbal postoperative instructions.  She is to return to the clinic as needed.        HPI: Shanda Manrique presented to the clinic today complaining of thick, discolored, dystrophic nails 1 through 5 both feet.  The patient indicated that she has had a problem with her toenails for several years.  The nails are difficult to trim due to the thickness of the toenails.  She stated that both great toenails are painful.  Remaining nails do not cause any discomfort.  She has been offered oral antifungal treatment in the past but refused because of the potential side effects.  The patient has not had any redness, swelling, drainage or bleeding.  She is able to wear shoes fairly comfortably.  She denies any trauma to her feet.  The pain involving both great toenails is moderate to severe.  It is an aching type pain which is only relieved after removing her shoe gear.    History reviewed. No pertinent past medical history.    Social History     Socioeconomic History     Marital status: Single     Spouse name: Not on file     Number of children: Not on file     Years of education: Not on file     Highest education level: Not on file   Occupational History     Not on file   Tobacco Use     Smoking status: Current Every Day Smoker     Types:  Cigarettes     Smokeless tobacco: Never Used   Substance and Sexual Activity     Alcohol use: Not Currently     Drug use: Not Currently     Types: Marijuana     Comment: once in awhile     Sexual activity: Yes     Partners: Male   Other Topics Concern     Parent/sibling w/ CABG, MI or angioplasty before 65F 55M? Not Asked   Social History Narrative     Not on file     Social Determinants of Health     Financial Resource Strain: Not on file   Food Insecurity: Not on file   Transportation Needs: Not on file   Physical Activity: Not on file   Stress: Not on file   Social Connections: Not on file   Intimate Partner Violence: Not on file   Housing Stability: Not on file          Allergies   Allergen Reactions     Codeine Hives     PN: LW Reaction: HIVES     Morphine Hives     Tramadol Hives     PN: LW Reaction: HIVES            Current Outpatient Medications:      triamcinolone (KENALOG) 0.1 % external cream, Apply topically 2 times daily as needed for irritation, Disp: 453.6 g, Rfl: 1     vitamin D2 (ERGOCALCIFEROL) 35567 units (1250 mcg) capsule, Take 1 capsule (50,000 Units) by mouth once a week, Disp: 12 capsule, Rfl: 3     Family History   Problem Relation Age of Onset     Diabetes Maternal Uncle      Breast Cancer Maternal Aunt      Glaucoma No family hx of      Macular Degeneration No family hx of      Melanoma No family hx of      Skin Cancer No family hx of         Social History     Socioeconomic History     Marital status: Single     Spouse name: Not on file     Number of children: Not on file     Years of education: Not on file     Highest education level: Not on file   Occupational History     Not on file   Tobacco Use     Smoking status: Current Every Day Smoker     Types: Cigarettes     Smokeless tobacco: Never Used   Substance and Sexual Activity     Alcohol use: Not Currently     Drug use: Not Currently     Types: Marijuana     Comment: once in awhile     Sexual activity: Yes     Partners: Male   Other  "Topics Concern     Parent/sibling w/ CABG, MI or angioplasty before 65F 55M? Not Asked   Social History Narrative     Not on file     Social Determinants of Health     Financial Resource Strain: Not on file   Food Insecurity: Not on file   Transportation Needs: Not on file   Physical Activity: Not on file   Stress: Not on file   Social Connections: Not on file   Intimate Partner Violence: Not on file   Housing Stability: Not on file        Review of Systems - Patient denies fever, chills, rash, wound, stiffness, limping, numbness, weakness, heart burn, blood in stool, chest pain with activity, calf pain when walking, shortness of breath with activity, chronic cough, easy bleeding/bruising, swelling of ankles, excessive thirst, fatigue, depression, anxiety.  Patient admits to painful thick toenails both great toes and thick discolored toenails 1 through 5 both feet.      OBJECTIVE:  Appearance: alert, well appearing, and in no distress.    Pulse 107   Ht 1.74 m (5' 8.5\")   Wt 126.6 kg (279 lb)   SpO2 99%   BMI 41.80 kg/m       Body mass index is 41.8 kg/m .     General appearance: Patient is alert and fully cooperative with history & exam.  No sign of distress is noted during the visit.  Psychiatric: Affect is pleasant & appropriate.  Patient appears motivated to improve health.  Respiratory: Breathing is regular & unlabored while sitting.  HEENT: Hearing is intact to spoken word.  Speech is clear.  No gross evidence of visual impairment that would impact ambulation.    Vascular: Dorsalis pedis and posterior tibial pulses are palpable. There is pedal hair growth bilaterally.  CFT < 3 sec from anterior tibial surface to distal digits bilaterally. There is no appreciable edema noted.  Dermatologic: Thick, discolored, dystrophic nails 1 through 5 both feet.  All nails are 2-3 times normal thickness.  Both great toenails are severely incurvated on the medial and lateral borders.  Turgor and texture are within normal " limits. No coloration or temperature changes. No primary or secondary lesions noted.  Neurologic: All epicritic and proprioceptive sensations are grossly intact bilaterally.  Musculoskeletal: All active and passive ankle, subtalar, midtarsal, and 1st MPJ range of motion are grossly intact without pain or crepitus, with the exception of none. Manual muscle strength is within normal limits bilaterally. All dorsiflexors, plantarflexors, invertors, evertors are intact bilaterally. Tenderness present to both great toenails on palpation.  No tenderness to both great toes with range of motion. Calf is soft/non-tender without warmth/induration    Imaging:       No images are attached to the encounter or orders placed in the encounter.     No results found.   No results found.       Eric Hairston DPM  [facility] Foot & Ankle Surgery/Podiatry         Again, thank you for allowing me to participate in the care of your patient.        Sincerely,        Eric Bentley DPM

## 2022-06-09 NOTE — PATIENT INSTRUCTIONS
POSTOPERATIVE INSTRUCTIONS AFTER CHEMICAL NAIL REMOVAL SURGERY    What to expect after surgery:  Your toe will be numb for around 2-8 hours after your nail procedure due to the shots that were given.  Expect some degree of soreness in your toe later today when the numbness wears off.  Rest, elevation and ice applied at the ankle will help ease the pain. Your bandage was wrapped snug to cut down on bleeding.    This may feel tight when the numbness wears off.  Please remove the bandage tomorrow morning and begin the foot soaks described below.  Warm water will feel good and helps to ease the pain  How to Care for Your Toe After Surgery  One daily foot soak will be necessary to heal properly.  Chemicals were used to kill the root of the nail.  Expect local redness, drainage and tenderness , this will last for 6-8 weeks.  Soaking helps loosen the scab and dried drainage.  Failure to soak leads to a hard scab that blocks drainage.  Back up drainage increases the pain and the scab may need to be removed with another office procedure.  You will heal much quicker and be more comfortable if you are consistent with local wound care and foot soaks.  Soak one time every day for 2 weeks.  Soaks should last 10 minutes.  Soak after taking a shower to get the germs out.  Soak in warm water with hydrogen peroxide 5 parts water to 1 part hydrogen peroxide.  A small amount of bleeding may be noted which is normal.   Clean the surgical site with a Q-tip during each soak.  Dip the Q-tip in the water and gently clean away any crusted drainage.  The area may be tender but you will not harm anything with the Q-tip.  Pat the area dry and allow a few minutes to air dry before applying any bandages.  Flexible fabric style band aids work best.  In general, the area will be wet from drainage.  A small dab of antibiotic ointment is okay but watch out for excessive moisture.  White and wrinkled skin is a sign of too much moisture and that the  skin needs to be dried out. It is ok to allow the toe some air by removing the band aid as needed.  Activity  Feel free to do normal activities as tolerated on the following day.  Wear open toe sandals if regular shoes are not comfortable.  Avoid shoes that are tight on the toe.  Medications   Finish any antibiotics if they have been prescribed.  Tylenol or ibuprofen may be used as needed for pain.  Icing and elevation also help with pain and swelling.  Risks  Watch for signs of infection.  It is normal to see redness, local tenderness, and drainage around the nail area for up to 8 weeks after permanent nail removal surgery.  Call the clinic at [phone] if you see red streaks spreading up the toe, foot, or leg.  Fever and chills are also concerning and you should notify the clinic if you are having these symptoms.  If these symptoms occur when the clinic is closed, please go to urgent care.  How Well Does Permanent Nail Surgery Work?  Permanent nail surgery means that we intend that the nail problem will not come back.  In a small percentage of patients, nail problems return in about 6 months to a year.  We would like to see you back in the office if you are experiencing problems in the future.  Please call [phone] with any additional questions.

## 2022-10-11 ENCOUNTER — APPOINTMENT (OUTPATIENT)
Dept: LAB | Facility: CLINIC | Age: 32
End: 2022-10-11
Payer: COMMERCIAL

## 2022-10-11 ENCOUNTER — OFFICE VISIT (OUTPATIENT)
Dept: FAMILY MEDICINE | Facility: CLINIC | Age: 32
End: 2022-10-11
Payer: COMMERCIAL

## 2022-10-11 VITALS
TEMPERATURE: 97.5 F | HEART RATE: 82 BPM | DIASTOLIC BLOOD PRESSURE: 83 MMHG | WEIGHT: 274 LBS | OXYGEN SATURATION: 100 % | BODY MASS INDEX: 41.06 KG/M2 | SYSTOLIC BLOOD PRESSURE: 124 MMHG

## 2022-10-11 DIAGNOSIS — Z11.3 SCREEN FOR STD (SEXUALLY TRANSMITTED DISEASE): Primary | ICD-10-CM

## 2022-10-11 DIAGNOSIS — R20.2 LEFT LEG PARESTHESIAS: ICD-10-CM

## 2022-10-11 DIAGNOSIS — Z32.00 PREGNANCY EXAMINATION OR TEST, PREGNANCY UNCONFIRMED: ICD-10-CM

## 2022-10-11 LAB
CLUE CELLS: PRESENT
TRICHOMONAS, WET PREP: ABNORMAL
WBC'S/HIGH POWER FIELD, WET PREP: ABNORMAL
YEAST, WET PREP: ABNORMAL

## 2022-10-11 PROCEDURE — 87210 SMEAR WET MOUNT SALINE/INK: CPT

## 2022-10-11 PROCEDURE — 36415 COLL VENOUS BLD VENIPUNCTURE: CPT

## 2022-10-11 PROCEDURE — 86780 TREPONEMA PALLIDUM: CPT

## 2022-10-11 PROCEDURE — 87491 CHLMYD TRACH DNA AMP PROBE: CPT

## 2022-10-11 PROCEDURE — 87591 N.GONORRHOEAE DNA AMP PROB: CPT

## 2022-10-11 PROCEDURE — 99213 OFFICE O/P EST LOW 20 MIN: CPT

## 2022-10-11 PROCEDURE — 87389 HIV-1 AG W/HIV-1&-2 AB AG IA: CPT

## 2022-10-11 ASSESSMENT — PAIN SCALES - GENERAL: PAINLEVEL: MILD PAIN (3)

## 2022-10-11 NOTE — PROGRESS NOTES
Assessment & Plan     Screen for STD (sexually transmitted disease)  - NEISSERIA GONORRHOEA PCR; Future  - CHLAMYDIA TRACHOMATIS PCR; Future  - HIV Antigen Antibody Combo; Future  - Treponema Abs w Reflex to RPR and Titer; Future  - Wet prep - Clinic Collect  - HIV Antigen Antibody Combo  - Treponema Abs w Reflex to RPR and Titer  - NEISSERIA GONORRHOEA PCR  - CHLAMYDIA TRACHOMATIS PCR    Left leg paresthesias  - Physical Therapy Referral; Future  Unclear etiology. Given that patient has chronic back pain, this may be related to a disc herniation or radiculopathy. She may also be compensating for her knee pain by using poor mechanics with her back. Will try PT to determine if this is helpful for pain.    Pregnancy examination or test, pregnancy unconfirmed  - HCG qualitative urine POCT, Enter/Edit Result         No follow-ups on file.    Srini Knapp NP  [facility]    Unique Land is a 32 year old presenting for the following health issues:  History of Present Illness (Dark pots, STD and Breast Screening)    Sexually active and condom broke 3 weeks ago. LMP about 3 weeks ago a few days after condom broke. No drainage symptoms or foul smell. No genital sores. No fever, sore throat, fatigue. She is just wondering about receiving an STD screening today.    She is also hoping for a preventative breast exam. No specific lump concerns. She does report intermittent drainage from right nipple since having her child who is 2 years old (not currently breastfeeding)    Knee: numbness in the front. Pain occurs with getting up. Numbness comes with standing and then subsides after she has stood up for a time.       History of Present Illness       Reason for visit:  Follow up and a pyhsical check up    She eats 2-3 servings of fruits and vegetables daily.She consumes 2 sweetened beverage(s) daily.She exercises with enough effort to increase her heart rate 10 to 19 minutes per day.  She  exercises with enough effort to increase her heart rate 3 or less days per week. She is missing 1 dose(s) of medications per week.       Review of Systems   Constitutional, HEENT, cardiovascular, pulmonary, gi and gu systems are negative, except as otherwise noted.      Objective    /83 (BP Location: Right arm, Patient Position: Sitting, Cuff Size: Adult Large)   Pulse 82   Temp 97.5  F (36.4  C) (Temporal)   Wt 124.3 kg (274 lb)   SpO2 100%   BMI 41.06 kg/m    Body mass index is 41.06 kg/m .  Physical Exam   GENERAL: healthy, alert and no distress  EYES: Eyes grossly normal to inspection, PERRL and conjunctivae and sclerae normal  HENT: ear canals and TM's normal, nose and mouth without ulcers or lesions  NECK: no adenopathy, no asymmetry, masses, or scars and thyroid normal to palpation  RESP: lungs clear to auscultation - no rales, rhonchi or wheezes  BREAST: normal without masses, tenderness or nipple discharge and no palpable axillary masses or adenopathy  CV: regular rate and rhythm, normal S1 S2, no S3 or S4, no murmur, click or rub, no peripheral edema and peripheral pulses strong  SKIN: no suspicious lesions or rashes  PSYCH: mentation appears normal, affect normal/bright

## 2022-10-12 ENCOUNTER — TELEPHONE (OUTPATIENT)
Dept: FAMILY MEDICINE | Facility: CLINIC | Age: 32
End: 2022-10-12

## 2022-10-12 DIAGNOSIS — B96.89 BACTERIAL VAGINOSIS: Primary | ICD-10-CM

## 2022-10-12 DIAGNOSIS — N76.0 BACTERIAL VAGINOSIS: Primary | ICD-10-CM

## 2022-10-12 LAB
C TRACH DNA SPEC QL NAA+PROBE: NEGATIVE
HCG UR QL: NEGATIVE
HIV 1+2 AB+HIV1 P24 AG SERPL QL IA: NONREACTIVE
N GONORRHOEA DNA SPEC QL NAA+PROBE: NEGATIVE
T PALLIDUM AB SER QL: NONREACTIVE

## 2022-10-12 PROCEDURE — 81025 URINE PREGNANCY TEST: CPT

## 2022-10-12 RX ORDER — METRONIDAZOLE 500 MG/1
500 TABLET ORAL 2 TIMES DAILY
Qty: 14 TABLET | Refills: 0 | Status: SHIPPED | OUTPATIENT
Start: 2022-10-12 | End: 2022-10-19

## 2022-10-12 NOTE — TELEPHONE ENCOUNTER
Srini -     Patient is calling to see if you want her to start some antibiotics (clue cells present)?    Reached out to lab on 7th floor for HCG Qual results - patient anxious for results    Criss Gabriel RN  [facility]

## 2022-10-12 NOTE — TELEPHONE ENCOUNTER
VM did not ID self, LMOM asking for call back for results and also notified pt that they will release to [employer].   Argelia HYDE RN

## 2022-10-19 ENCOUNTER — THERAPY VISIT (OUTPATIENT)
Dept: PHYSICAL THERAPY | Facility: CLINIC | Age: 32
End: 2022-10-19
Payer: COMMERCIAL

## 2022-10-19 DIAGNOSIS — R20.2 LEFT LEG PARESTHESIAS: ICD-10-CM

## 2022-10-19 PROCEDURE — 97110 THERAPEUTIC EXERCISES: CPT | Mod: GP | Performed by: PHYSICAL THERAPIST

## 2022-10-19 PROCEDURE — 97161 PT EVAL LOW COMPLEX 20 MIN: CPT | Mod: GP | Performed by: PHYSICAL THERAPIST

## 2022-10-19 NOTE — PROGRESS NOTES
Physical Therapy Initial Evaluation  Subjective:  [facility]  Physical Therapy Initial Examination/Evaluation  October 19, 2022    Shanda Manrique is a 32 year old  female referred to physical therapy by Dr. Knapp for treatment of leg parasthesia with Precautions/Restrictions/MD instructions none    KEY PT FINDINGS:  1.  Suspect lumbar radiculopathy  2.  Poor core stability  3.  Normal dermatomal and myotomal exam today    Subjective:  Referring MD visit date: 10/11/22  DOI/onset: August 2022  Mechanism of injury: fell onto knee. Noticed numbness in thigh about a week later  DOS none  Previous treatment: none  Imaging: none  Chief Complaint:   Numbness in thigh after prolonged positions.   Pain: best 0 /10, worst 3/10 anterior thigh Described as: numb Alleviated by: ibuprofen Frequency: constant Progression of symptoms since initial onset: worsening Time of day when pain is worse: not related  Sleeping: not affected  Social history:  Son is two year old    Occupation: none  Job duties:  sitting    Current HEP/exercise regimen: walking  Patient's goals are reduce pain and numbness    Pertinent PMH: none   General Health Reported by Patient: excellent  Return to MD:  PRN         Objective:    Gait:    Gait Type:  Normal   Assistive Devices:  None                 Lumbar/SI Evaluation  ROM:    AROM Lumbar:   Flexion:            100%  Ext:                    100%   Side Bend:        Left:  100%    Right:  100%  Rotation:           Left:  100%    Right:  100%  Side Glide:        Left:     Right:         Strength: poor core strength; glute med 4/5 on L, 5-/5 on R  Lumbar Myotomes:    T12-L3 (Hip Flex):  Left: 5    Right: 5  L2-4 (Quads):  Left:  5    Right:  5  L4 (Ankle DF):  Left:  5    Right:  5  L5 (Great Toe Ext): Left: 5    Right: 5   S1 (Toe Raise):  Left: 5    Right: 5      Lumbar Dermtomes:  normal                Neural Tension/Mobility:  Lumbar:  Normal        Lumbar  Palpation:  normal            SI joint/Sacrum:            Sacral conclusion left:  Posterior inominate                                               General     ROS      Assessment/Plan      Patient is a 32 year old female with lumbar complaints.    Patient has the following significant findings with corresponding treatment plan.                Diagnosis 1:  Lumbar radiculopathy    Pain -  hot/cold therapy, US, electric stimulation, manual therapy, splint/taping/bracing/orthotics, self management, education, and home program  Decreased ROM/flexibility - manual therapy and therapeutic exercise  Decreased joint mobility - manual therapy and therapeutic exercise  Decreased strength - therapeutic exercise and therapeutic activities  Impaired balance - neuro re-education and therapeutic activities  Decreased proprioception - neuro re-education and therapeutic activities  Inflammation - cold therapy  Edema - vasopneumatics  Impaired gait - gait training  Impaired muscle performance - neuro re-education  Decreased function - therapeutic activities    Therapy Evaluation Codes:   1) History comprised of:   Personal factors that impact the plan of care:      None.    Comorbidity factors that impact the plan of care are:      None.     Medications impacting care: None.  2) Examination of Body Systems comprised of:   Body structures and functions that impact the plan of care:      Lumbar spine.   Activity limitations that impact the plan of care are:      Sitting, Standing and Walking.  3) Clinical presentation characteristics are:   Stable/Uncomplicated.  4) Decision-Making    Low complexity using standardized patient assessment instrument and/or measureable assessment of functional outcome.  Cumulative Therapy Evaluation is: Low complexity.    Previous and current functional limitations:  (See Goal Flow Sheet for this information)    Short term and Long term goals: (See Goal Flow Sheet for this information)     Communication  ability:  Patient appears to be able to clearly communicate and understand verbal and written communication and follow directions correctly.  Treatment Explanation - The following has been discussed with the patient:   RX ordered/plan of care  Anticipated outcomes  Possible risks and side effects  This patient would benefit from PT intervention to resume normal activities.   Rehab potential is good.    Frequency:  2 X a month, once daily  Duration:  for 3 months  Discharge Plan:  Achieve all LTG.  Independent in home treatment program.  Reach maximal therapeutic benefit.    Please refer to the daily flowsheet for treatment today, total treatment time and time spent performing 1:1 timed codes.

## 2022-10-19 NOTE — PROGRESS NOTES
[facility]    OUTPATIENT Physical Therapy ORTHOPEDIC EVALUATION  PLAN OF TREATMENT FOR OUTPATIENT REHABILITATION  (COMPLETE FOR INITIAL CLAIMS ONLY)  Patient's Last Name, First Name, M.I.  YOB: 1990  Shanda Manrique    Provider s Name:  [facility]   Medical Record No.  [id number]   Start of Care Date:  10/19/22   Onset Date:   10/11/22   Treatment Diagnosis:  lumbar radic Medical Diagnosis:  Left leg paresthesias       Goals:     10/19/22 0500   Body Part   Goals listed below are for lumbar   Goal #1   Goal #1 sitting   Previous Functional Level No restrictions   Performance level onset of numbness after >5 min sitting   Performance level no numbness with sitting x 10 min   Rationale to allow rest from standing   Due date 11/09/22   Performance Level no numbness 20 min sitting   Rationale to allow rest from standing   Due date 12/18/22       Therapy Frequency:  2x/month  Predicted Duration of Therapy Intervention:  3 months    Medina Vargas, PT                 I CERTIFY THE NEED FOR THESE SERVICES FURNISHED UNDER        THIS PLAN OF TREATMENT AND WHILE UNDER MY CARE     (Physician co-signature of this document indicates review and certification of the therapy plan).                     Certification Date From:  10/19/22   Certification Date To:  01/16/23    Referring Provider:  Srini Knapp    Initial Assessment        See Epic Evaluation SOC Date: 10/19/22

## 2022-11-21 ENCOUNTER — HEALTH MAINTENANCE LETTER (OUTPATIENT)
Age: 32
End: 2022-11-21

## 2022-12-10 ENCOUNTER — HOSPITAL ENCOUNTER (EMERGENCY)
Facility: CLINIC | Age: 32
Discharge: HOME | End: 2022-12-10
Attending: EMERGENCY MEDICINE | Admitting: EMERGENCY MEDICINE
Payer: COMMERCIAL

## 2022-12-10 VITALS
TEMPERATURE: 98.3 F | HEART RATE: 81 BPM | RESPIRATION RATE: 20 BRPM | DIASTOLIC BLOOD PRESSURE: 73 MMHG | WEIGHT: 268 LBS | BODY MASS INDEX: 39.69 KG/M2 | HEIGHT: 69 IN | OXYGEN SATURATION: 100 % | SYSTOLIC BLOOD PRESSURE: 119 MMHG

## 2022-12-10 DIAGNOSIS — K59.00 CONSTIPATION, UNSPECIFIED CONSTIPATION TYPE: ICD-10-CM

## 2022-12-10 DIAGNOSIS — R11.0 NAUSEA: ICD-10-CM

## 2022-12-10 DIAGNOSIS — Z71.1 CONCERN ABOUT STD IN FEMALE WITHOUT DIAGNOSIS: ICD-10-CM

## 2022-12-10 LAB
ALBUMIN SERPL-MCNC: 3.4 G/DL (ref 3.4–5)
ALBUMIN UR-MCNC: 10 MG/DL
ALP SERPL-CCNC: 67 U/L (ref 40–150)
ALT SERPL W P-5'-P-CCNC: 20 U/L (ref 0–50)
ANION GAP SERPL CALCULATED.3IONS-SCNC: 5 MMOL/L (ref 3–14)
APPEARANCE UR: ABNORMAL
AST SERPL W P-5'-P-CCNC: 9 U/L (ref 0–45)
BACTERIA #/AREA URNS HPF: ABNORMAL /HPF
BASOPHILS # BLD AUTO: 0 10E3/UL (ref 0–0.2)
BASOPHILS NFR BLD AUTO: 1 %
BILIRUB SERPL-MCNC: 0.8 MG/DL (ref 0.2–1.3)
BILIRUB UR QL STRIP: NEGATIVE
BUN SERPL-MCNC: 8 MG/DL (ref 7–30)
CALCIUM SERPL-MCNC: 8.7 MG/DL (ref 8.5–10.1)
CHLORIDE BLD-SCNC: 107 MMOL/L (ref 94–109)
CLUE CELLS: ABNORMAL
CO2 SERPL-SCNC: 24 MMOL/L (ref 20–32)
COLOR UR AUTO: YELLOW
CREAT SERPL-MCNC: 0.63 MG/DL (ref 0.52–1.04)
EOSINOPHIL # BLD AUTO: 0.2 10E3/UL (ref 0–0.7)
EOSINOPHIL NFR BLD AUTO: 2 %
ERYTHROCYTE [DISTWIDTH] IN BLOOD BY AUTOMATED COUNT: 14.4 % (ref 10–15)
GFR SERPL CREATININE-BSD FRML MDRD: >90 ML/MIN/1.73M2
GLUCOSE BLD-MCNC: 104 MG/DL (ref 70–99)
GLUCOSE UR STRIP-MCNC: NEGATIVE MG/DL
HCG SERPL QL: NEGATIVE
HCT VFR BLD AUTO: 40.6 % (ref 35–47)
HGB BLD-MCNC: 13.2 G/DL (ref 11.7–15.7)
HGB UR QL STRIP: NEGATIVE
IMM GRANULOCYTES # BLD: 0 10E3/UL
IMM GRANULOCYTES NFR BLD: 0 %
KETONES UR STRIP-MCNC: NEGATIVE MG/DL
LEUKOCYTE ESTERASE UR QL STRIP: NEGATIVE
LIPASE SERPL-CCNC: 63 U/L (ref 73–393)
LYMPHOCYTES # BLD AUTO: 2.6 10E3/UL (ref 0.8–5.3)
LYMPHOCYTES NFR BLD AUTO: 34 %
MCH RBC QN AUTO: 28.5 PG (ref 26.5–33)
MCHC RBC AUTO-ENTMCNC: 32.5 G/DL (ref 31.5–36.5)
MCV RBC AUTO: 88 FL (ref 78–100)
MONOCYTES # BLD AUTO: 0.6 10E3/UL (ref 0–1.3)
MONOCYTES NFR BLD AUTO: 8 %
MUCOUS THREADS #/AREA URNS LPF: PRESENT /LPF
NEUTROPHILS # BLD AUTO: 4.3 10E3/UL (ref 1.6–8.3)
NEUTROPHILS NFR BLD AUTO: 55 %
NITRATE UR QL: NEGATIVE
NRBC # BLD AUTO: 0 10E3/UL
NRBC BLD AUTO-RTO: 0 /100
PH UR STRIP: 6.5 [PH] (ref 5–7)
PLATELET # BLD AUTO: 254 10E3/UL (ref 150–450)
POTASSIUM BLD-SCNC: 3.5 MMOL/L (ref 3.4–5.3)
PROT SERPL-MCNC: 7.7 G/DL (ref 6.8–8.8)
RBC # BLD AUTO: 4.63 10E6/UL (ref 3.8–5.2)
RBC URINE: 4 /HPF
SODIUM SERPL-SCNC: 136 MMOL/L (ref 133–144)
SP GR UR STRIP: 1.03 (ref 1–1.03)
SQUAMOUS EPITHELIAL: 20 /HPF
TRICHOMONAS, WET PREP: ABNORMAL
UROBILINOGEN UR STRIP-MCNC: 2 MG/DL
WBC # BLD AUTO: 7.7 10E3/UL (ref 4–11)
WBC URINE: 2 /HPF
WBC'S/HIGH POWER FIELD, WET PREP: ABNORMAL
YEAST, WET PREP: ABNORMAL

## 2022-12-10 PROCEDURE — 87591 N.GONORRHOEAE DNA AMP PROB: CPT | Performed by: EMERGENCY MEDICINE

## 2022-12-10 PROCEDURE — 36415 COLL VENOUS BLD VENIPUNCTURE: CPT | Performed by: EMERGENCY MEDICINE

## 2022-12-10 PROCEDURE — 87491 CHLMYD TRACH DNA AMP PROBE: CPT | Performed by: EMERGENCY MEDICINE

## 2022-12-10 PROCEDURE — 87210 SMEAR WET MOUNT SALINE/INK: CPT | Performed by: EMERGENCY MEDICINE

## 2022-12-10 PROCEDURE — 83690 ASSAY OF LIPASE: CPT | Performed by: EMERGENCY MEDICINE

## 2022-12-10 PROCEDURE — 84703 CHORIONIC GONADOTROPIN ASSAY: CPT | Performed by: EMERGENCY MEDICINE

## 2022-12-10 PROCEDURE — 81001 URINALYSIS AUTO W/SCOPE: CPT | Performed by: EMERGENCY MEDICINE

## 2022-12-10 PROCEDURE — 80053 COMPREHEN METABOLIC PANEL: CPT | Performed by: EMERGENCY MEDICINE

## 2022-12-10 PROCEDURE — 99283 EMERGENCY DEPT VISIT LOW MDM: CPT

## 2022-12-10 PROCEDURE — 85014 HEMATOCRIT: CPT | Performed by: EMERGENCY MEDICINE

## 2022-12-10 RX ORDER — ONDANSETRON HYDROCHLORIDE 4 MG/5ML
4 SOLUTION ORAL EVERY 4 HOURS PRN
Qty: 10 ML | Refills: 0 | Status: SHIPPED | OUTPATIENT
Start: 2022-12-10

## 2022-12-10 NOTE — ED PROVIDER NOTES
Visit Date: 12/10/2022    CHIEF COMPLAINT:  Constipation.    HISTORY OF PRESENT ILLNESS:  This is a 32-year-old woman who is heavy set and states that she has had nausea for several days and has not been able to have a bowel movement for 6 days; however, she is able to eat and she has had no vomiting.  She cannot tell me when her last stool was.  She denies having this problem before.  Denies any new meds and denies any abdominal surgeries other than a previous .  She has not taken anything for pain or discomfort nor has she tried any stool softeners or anything to help her poop.  She tells me that she read; however, that gonorrhea can cause constipation and that is her concern right now.  She had gonorrhea once before and something like this happened.  She has a new sexual partner, but the condom has broken once, but she has had no vaginal discharge or drainage.    PAST MEDICAL HISTORY:  As noted above.  No other significant surgeries or hospitalizations.    FAMILY AND SOCIAL HISTORY:  Also as noted above.  She does smoke.  She is not currently drinking.    REVIEW OF SYSTEMS:  Noted.  All other systems negative.    PHYSICAL EXAMINATION:    GENERAL:  Heavyset, black female, supine, no respiratory distress.  VITAL SIGNS:  Blood pressure 128/68, temperature 98, pulse 83, respirations 18, pulse ox 100% on room air, weight 121 kilograms.   HEAD AND NECK:  Pupils equal, reactive.  Extraocular movements intact.  Oropharynx clear.  LUNGS:  Clear.  HEART:  Regular, no murmur, rubs, or gallops.  ABDOMEN:  Soft, without tenderness or masses.  MUSCULOSKELETAL:  No swelling or tenderness.  SKIN:  No rash.  NEUROLOGIC:  Awake, alert and appropriate.  Normal motor sensation, coordination.  PELVIC:  Vulva was unremarkable.  The vagina revealed a small amount of thick whitish discharge.  The cervix was closed.  No cervical motion tenderness, no uterine or adnexal tenderness.  I also did a rectal which revealed no impaction  and a small amount of brown stool.    EMERGENCY DEPARTMENT COURSE:  Labs had been ordered by nursing.  White count 7.7, hemoglobin 13.2, platelet count 254.  Chemistries:  Glucose 104, otherwise normal.  Lipase 63.  Pregnancy negative.  Urine: Some mucus was present, 4 red cells, 2 white, 20 squamous cells.  Wet prep revealed 2+ white cells, but negative yeast, trich and clue cells.  GC and chlamydia are pending.  The patient did not show any signs of significant abdominal pain here and she is not pregnant.  My concern is that this may be more of an STD check for her, although other causes of constipation needed to be ruled out.  She does not seem to have bowel obstruction, abscess or perforation and she is on no narcotics or other medications that should cause this.  She has had no moving or change in her lifestyle either.  I have recommended the patient start MiraLax 1-2 tablespoons daily.  Also, we will contact her if she has GC or chlamydia.  She does not have any clinical evidence of this and I have not started her empirically on antibiotics for that reason.    IMPRESSION:  1.  Constipation.  2.  Nausea of uncertain cause.  3.  Concern for STD.      The patient has been given some Zofran to use as needed and she should follow up with her primary.  If symptoms change or worsen, recheck in the ED.    Darnell Oro MD        D: 12/10/2022   T: 12/10/2022   MT: [id number]    Name:     JOSE ANTONIO PICHARDO  MRN:      [id number]        Account:    [id number]   :      1990           Visit Date: 12/10/2022     Document: [id number]

## 2022-12-10 NOTE — ED TRIAGE NOTES
Pt reports has been nauseated for 10 days. Has not been able to have BM for 6 days. Pt states feels weak, has body aches. Has not tried home remedies for relief     Triage Assessment     Row Name 12/10/22 1322       Triage Assessment (Adult)    Airway WDL WDL       Respiratory WDL    Respiratory WDL WDL       Skin Circulation/Temperature WDL    Skin Circulation/Temperature WDL WDL       Cardiac WDL    Cardiac WDL WDL       Peripheral/Neurovascular WDL    Peripheral Neurovascular WDL WDL       Cognitive/Neuro/Behavioral WDL    Cognitive/Neuro/Behavioral WDL WDL

## 2022-12-11 LAB
C TRACH DNA SPEC QL NAA+PROBE: NEGATIVE
N GONORRHOEA DNA SPEC QL NAA+PROBE: NEGATIVE

## 2023-02-03 NOTE — PROGRESS NOTES
20w4d  Shanda is here today to establish care after MICHEL from [facility]. She was dismissed from this practice due to behavioral concerns. She shares today that she felt frustrated with care in that practice because she experienced headaches in pregnancy that were relieved by acetaminophen but wanted workup in person rather than phone visits. Discussed our practice's approach to virtual visits for initial workups and throughout prenatal care. Shanda is understanding of this policy. She feels positively about our practice and appreciates open communication with team. Gave handout introducing CNM team with clinic phone number and instructed about when to call.    Overall, Shanda says her pregnancy is going well. She feels slight fatigue and does not drink very much water. She has had headaches this pregnancy that are relieved with acetaminophen. Some blurry vision but denies black spots or flashes of light in vision. Denies RUQ pain. Reviewed preeclampsia warning signs. No fetal movement yet. Denies leaking of fluid, vaginal bleeding, regular uterine contractions, or other concerns.     Shanda is having some vaginal itching and odor. She has a hx of yeast infection this pregnancy. Wet prep shows clue cells, positive for Bacterial Vaginosis. Rx for metronidazole 500mg BID x 7 days.    She would like an Rx for prenatal vitamin and Vitamin D for deficiency. Reviewed US today. Needs repeat US for poor visualization of fetal head and brain due to habitus and fetal position.     Instructed to return to clinic for repeat US in 2 weeks and then phone visit at 24 weeks.    LEV Strickland CNM    
same name as above

## 2023-04-16 ENCOUNTER — HOSPITAL ENCOUNTER (EMERGENCY)
Facility: CLINIC | Age: 33
Discharge: HOME | End: 2023-04-16
Attending: EMERGENCY MEDICINE | Admitting: EMERGENCY MEDICINE
Payer: COMMERCIAL

## 2023-04-16 ENCOUNTER — HEALTH MAINTENANCE LETTER (OUTPATIENT)
Age: 33
End: 2023-04-16

## 2023-04-16 VITALS
OXYGEN SATURATION: 99 % | TEMPERATURE: 98.1 F | DIASTOLIC BLOOD PRESSURE: 85 MMHG | SYSTOLIC BLOOD PRESSURE: 121 MMHG | HEART RATE: 78 BPM | RESPIRATION RATE: 18 BRPM

## 2023-04-16 DIAGNOSIS — R19.7 NAUSEA VOMITING AND DIARRHEA: ICD-10-CM

## 2023-04-16 DIAGNOSIS — R11.2 NAUSEA VOMITING AND DIARRHEA: ICD-10-CM

## 2023-04-16 DIAGNOSIS — G43.909 MIGRAINE WITHOUT STATUS MIGRAINOSUS, NOT INTRACTABLE, UNSPECIFIED MIGRAINE TYPE: ICD-10-CM

## 2023-04-16 LAB
ALBUMIN UR-MCNC: 10 MG/DL
APPEARANCE UR: CLEAR
BILIRUB UR QL STRIP: NEGATIVE
COLOR UR AUTO: YELLOW
GLUCOSE UR STRIP-MCNC: NEGATIVE MG/DL
HCG UR QL: NEGATIVE
HGB UR QL STRIP: NEGATIVE
KETONES UR STRIP-MCNC: NEGATIVE MG/DL
LEUKOCYTE ESTERASE UR QL STRIP: NEGATIVE
MUCOUS THREADS #/AREA URNS LPF: PRESENT /LPF
NITRATE UR QL: NEGATIVE
PH UR STRIP: 6.5 [PH] (ref 5–7)
RBC URINE: 2 /HPF
SP GR UR STRIP: 1.03 (ref 1–1.03)
SQUAMOUS EPITHELIAL: 1 /HPF
UROBILINOGEN UR STRIP-MCNC: 2 MG/DL
WBC URINE: <1 /HPF

## 2023-04-16 PROCEDURE — 96374 THER/PROPH/DIAG INJ IV PUSH: CPT

## 2023-04-16 PROCEDURE — 96375 TX/PRO/DX INJ NEW DRUG ADDON: CPT

## 2023-04-16 PROCEDURE — 81025 URINE PREGNANCY TEST: CPT | Performed by: EMERGENCY MEDICINE

## 2023-04-16 PROCEDURE — 81001 URINALYSIS AUTO W/SCOPE: CPT | Performed by: EMERGENCY MEDICINE

## 2023-04-16 PROCEDURE — 250N000013 HC RX MED GY IP 250 OP 250 PS 637: Performed by: EMERGENCY MEDICINE

## 2023-04-16 PROCEDURE — 250N000011 HC RX IP 250 OP 636: Performed by: EMERGENCY MEDICINE

## 2023-04-16 PROCEDURE — 99285 EMERGENCY DEPT VISIT HI MDM: CPT | Mod: 25

## 2023-04-16 PROCEDURE — 258N000003 HC RX IP 258 OP 636: Performed by: EMERGENCY MEDICINE

## 2023-04-16 RX ORDER — ACETAMINOPHEN 325 MG/1
975 TABLET ORAL ONCE
Status: COMPLETED | OUTPATIENT
Start: 2023-04-16 | End: 2023-04-16

## 2023-04-16 RX ORDER — DIPHENHYDRAMINE HYDROCHLORIDE 50 MG/ML
25 INJECTION INTRAMUSCULAR; INTRAVENOUS ONCE
Status: COMPLETED | OUTPATIENT
Start: 2023-04-16 | End: 2023-04-16

## 2023-04-16 RX ORDER — LOPERAMIDE HYDROCHLORIDE 2 MG/1
2 TABLET ORAL 4 TIMES DAILY PRN
Qty: 18 TABLET | Refills: 0 | Status: SHIPPED | OUTPATIENT
Start: 2023-04-16

## 2023-04-16 RX ORDER — DROPERIDOL 2.5 MG/ML
0.62 INJECTION, SOLUTION INTRAMUSCULAR; INTRAVENOUS ONCE
Status: COMPLETED | OUTPATIENT
Start: 2023-04-16 | End: 2023-04-16

## 2023-04-16 RX ORDER — ONDANSETRON 4 MG/1
8 TABLET, ORALLY DISINTEGRATING ORAL EVERY 8 HOURS PRN
Qty: 12 TABLET | Refills: 0 | Status: SHIPPED | OUTPATIENT
Start: 2023-04-16 | End: 2023-04-19

## 2023-04-16 RX ADMIN — DROPERIDOL 0.62 MG: 2.5 INJECTION, SOLUTION INTRAMUSCULAR; INTRAVENOUS at 21:05

## 2023-04-16 RX ADMIN — ACETAMINOPHEN 975 MG: 325 TABLET ORAL at 21:11

## 2023-04-16 RX ADMIN — DIPHENHYDRAMINE HYDROCHLORIDE 25 MG: 50 INJECTION, SOLUTION INTRAMUSCULAR; INTRAVENOUS at 21:08

## 2023-04-16 RX ADMIN — SODIUM CHLORIDE 1000 ML: 9 INJECTION, SOLUTION INTRAVENOUS at 21:04

## 2023-04-16 ASSESSMENT — ACTIVITIES OF DAILY LIVING (ADL): ADLS_ACUITY_SCORE: 33

## 2023-04-16 NOTE — Clinical Note
Shanda Manrique was seen and treated in our emergency department on 4/16/2023.  She may return to work on 04/17/2023.       If you have any questions or concerns, please don't hesitate to call.      Rena Membreno MD

## 2023-04-17 NOTE — DISCHARGE INSTRUCTIONS
Zofran as needed for nausea, vomiting.  Imodium as needed for diarrhea.  Tylenol 1000 mg 4 times daily as needed for pain.  Ibuprofen 800 mg 3 times daily as needed for pain.

## 2023-04-17 NOTE — ED PROVIDER NOTES
EMERGENCY DEPARTMENT ENCOUNTER      NAME: Shanda Manrique  AGE: 32 year old female  YOB: 1990  MRN: [id number]  EVALUATION DATE & TIME: No admission date for patient encounter.    PCP: No Ref-Primary, Physician    ED PROVIDER: Rena Membreno MD    Chief Complaint   Patient presents with     Headache     Abdominal Pain         FINAL IMPRESSION:  1. Migraine without status migrainosus, not intractable, unspecified migraine type    2. Nausea vomiting and diarrhea          ED COURSE & MEDICAL DECISION MAKING:    Pertinent Labs & Imaging studies reviewed. (See chart for details)  32 year old female with history of obesity who presents to the Emergency Department for evaluation of 3 days of frontal headache with photophobia, phonophobia as well as nausea.  Single episode of vomiting and lower abdominal cramping with diarrhea.  Son ill with similar symptoms.  Her headache sounds more migrainous in nature.  No fevers chills nuchal rigidity or meningismus and she does not warrant evaluation for meningitis or encephalitis nor neuroimaging for intracranial mass lesion or ICH.  With the lower abdominal cramping will obtain urinalysis to rule out UTI as well as urine pregnancy test as she is not on any form of birth control though has not missed any menstrual periods.  She does have loose stool and lower abdominal cramping and son is ill with similar symptoms.  We are currently seeing a lot of norovirus and rotavirus in her community at this time and suspect that this is related to viral GI illness.  Her abdomen is benign.    Patient initially seen evaluated myself in triage area due to boarding crisis.  IV established, given 1 L normal saline bolus, droperidol, Benadryl, Tylenol.  Urine pregnancy test negative.  Urinalysis unremarkable.  Patient improved after the above and will be discharged home with symptom control.      ED Course as of 04/16/23 2117   Sun Apr 16, 2023 2033 I met with the patient, obtained  history, performed an initial exam, and discussed options and plan for diagnostics and treatment here in the ED.    2054 HCG Qual Urine: Negative   2107 Reassessed and updated patient on her results.        Medical Decision Making    History:    Supplemental history from: Documented in chart, if applicable    External Record(s) reviewed: Documented in chart, if applicable.    Work Up:    Chart documentation includes differential considered and any EKGs or imaging independently interpreted by provider, where specified.    In additional to work up documented, I considered the following work up: Documented in chart, if applicable.    External consultation:    Discussion of management with another provider: Documented in chart, if applicable    Complicating factors:    Care impacted by chronic illness: Other: obesity    Care affected by social determinants of health: Access to Medical Care    Disposition considerations: Discharge. I prescribed additional prescription strength medication(s) as charted. N/A.        At the conclusion of the encounter I discussed the results of all of the tests and the disposition. The questions were answered. The patient or family acknowledged understanding and was agreeable with the care plan.      MEDICATIONS GIVEN IN THE EMERGENCY:  Medications   0.9% sodium chloride BOLUS (1,000 mLs Intravenous $New Bag 4/16/23 2104)   droperidol (INAPSINE) injection 0.625 mg (0.625 mg Intravenous $Given 4/16/23 2105)   diphenhydrAMINE (BENADRYL) injection 25 mg (25 mg Intravenous $Given 4/16/23 2108)   acetaminophen (TYLENOL) tablet 975 mg (975 mg Oral $Given 4/16/23 2111)       NEW PRESCRIPTIONS STARTED AT TODAY'S ER VISIT  New Prescriptions    LOPERAMIDE (IMODIUM A-D) 2 MG TABLET    Take 1 tablet (2 mg) by mouth 4 times daily as needed for diarrhea    ONDANSETRON (ZOFRAN ODT) 4 MG ODT TAB    Take 2 tablets (8 mg) by mouth every 8 hours as needed for nausea           =================================================================    HPI    Patient information was obtained from: Patient     Use of Intrepreter: N/A      Shanda Manrique is a 32 year old female with pertinent medical history of STD, MRSA, who presents with headache and abdominal pain. Patient presents with frontal headache with associated hyperacusis and photophobia onset 2 days ago. Denies history of migraines or tension headaches. Additionally, patient reports endorsing lower abdominal pain with associated nausea, vomiting, and non-bloody loose stools since 2 days ago. Denies fever or chills. Patient notes her son is sick at home with similar symptoms. Patient's last menses was on 3/18. Patient is currently sexually active. Denies use of birth control. Patient denies vaginal discharge or odor, dysuria, hematuria, or any additional complaints at this time.     REVIEW OF SYSTEMS  Constitutional:  Denies fever, chills, weight loss or weakness  Eyes:  No pain, discharge, redness. Endorses photophobia.   HENT:  Denies sore throat, ear pain, congestion. Endorses hyperacusis.  GI:  Endorses abdominal pain, nausea, vomiting, diarrhea  : Denies dysuria, denies hematuria  Musculoskeletal:  Denies any new muscle/joint pain, swelling or loss of function.  Neurologic:  Denies focal weakness or sensory changes. Endorses headache.     PAST MEDICAL HISTORY:  Past Medical History:   Diagnosis Date     Obesity        PAST SURGICAL HISTORY:  Past Surgical History:   Procedure Laterality Date      SECTION N/A 2020    Procedure:  SECTION;  Surgeon: Mago Dumont MD;  Location: [facility] L+D     CHALAZION EXCISION         CURRENT MEDICATIONS:    Prior to Admission Medications   Prescriptions Last Dose Informant Patient Reported? Taking?   ondansetron (ZOFRAN) 4 MG/5ML solution   No No   Sig: Take 5 mLs (4 mg) by mouth every 4 hours as needed for nausea or vomiting   triamcinolone (KENALOG) 0.1 % external cream    No No   Sig: Apply topically 2 times daily as needed for irritation   vitamin D2 (ERGOCALCIFEROL) 70212 units (1250 mcg) capsule   No No   Sig: Take 1 capsule (50,000 Units) by mouth once a week      Facility-Administered Medications: None       ALLERGIES:  Allergies   Allergen Reactions     Codeine Hives     PN: LW Reaction: HIVES     Morphine Hives     Tramadol Hives     PN: LW Reaction: HIVES         FAMILY HISTORY:  Family History   Problem Relation Age of Onset     Diabetes Maternal Uncle      Breast Cancer Maternal Aunt      Glaucoma No family hx of      Macular Degeneration No family hx of      Melanoma No family hx of      Skin Cancer No family hx of        SOCIAL HISTORY:  Social History     Tobacco Use     Smoking status: Every Day     Packs/day: 0.25     Types: Cigarettes     Smokeless tobacco: Never   Vaping Use     Vaping status: Never Used   Substance Use Topics     Alcohol use: Not Currently     Comment: Seldom     Drug use: Not Currently     Types: Marijuana     Comment: once in awhile        VITALS:  Patient Vitals for the past 24 hrs:   BP Temp Temp src Pulse Resp SpO2   04/16/23 2031 -- 98.1  F (36.7  C) Temporal -- -- --   04/16/23 2030 121/85 -- -- 88 18 99 %       PHYSICAL EXAM    General Appearance: Well-appearing, well-nourished, no acute distress   Head:  Normocephalic, atraumatic  Eyes:  PERRL, conjunctiva/corneas clear, EOM's intact  ENT:   membranes are moist without pallor  Neck:  Supple, no nuchal rigidity or meningismus  Cardio:  Regular rate and rhythm  Pulm:  No respiratory distress  Back:  No CVA tenderness, normal ROM  Abdomen: Obese  Soft, non-reproducible tenderness to palpation, non distended,no rebound or guarding.  Extremities: Moves extremities normally, normal gait  Skin:  Skin warm, dry, no rashes  Neuro:  Alert and oriented ×3, cranial nerves II through XII intact, moving all extremities 5 out of 5 upper lower extremity strength, no gross sensory defects      RADIOLOGY/LABS:  Reviewed all pertinent imaging. Please see official radiology report. All pertinent labs reviewed and interpreted.    Results for orders placed or performed during the hospital encounter of 04/16/23   UA with Microscopic reflex to Culture    Specimen: Urine, Clean Catch   Result Value Ref Range    Color Urine Yellow Colorless, Straw, Light Yellow, Yellow    Appearance Urine Clear Clear    Glucose Urine Negative Negative mg/dL    Bilirubin Urine Negative Negative    Ketones Urine Negative Negative mg/dL    Specific Gravity Urine 1.029 1.001 - 1.030    Blood Urine Negative Negative    pH Urine 6.5 5.0 - 7.0    Protein Albumin Urine 10 (A) Negative mg/dL    Urobilinogen Urine 2.0 (A) <2.0 mg/dL    Nitrite Urine Negative Negative    Leukocyte Esterase Urine Negative Negative    Mucus Urine Present (A) None Seen /LPF    RBC Urine 2 <=2 /HPF    WBC Urine <1 <=5 /HPF    Squamous Epithelials Urine 1 <=1 /HPF   HCG qualitative urine   Result Value Ref Range    hCG Urine Qualitative Negative Negative     The creation of this record is based on the scribe s observations of the work being performed by Rena Membreno MD and the provider s statements to them. It was created on her behalf by Alistair Pfeiffer, a trained medical scribe. This document has been checked and approved by the attending provider.    Rena Membreno MD  Emergency Medicine  [facility] EMERGENCY ROOM  [address] [id number] [phone]  Dept: [phone]     Rena Membreno MD  04/16/23 1272

## 2023-04-17 NOTE — ED TRIAGE NOTES
Pt presents with frontal headache and generalized bilateral lower abd pain that started on Friday. Pt reports n/v/d associated with symptoms. ABCs intact. Ibuprofen taken two hours PTA.       Triage Assessment     Row Name 04/16/23 2032       Triage Assessment (Adult)    Airway WDL WDL       Respiratory WDL    Respiratory WDL WDL       Skin Circulation/Temperature WDL    Skin Circulation/Temperature WDL WDL       Cardiac WDL    Cardiac WDL WDL       Peripheral/Neurovascular WDL    Peripheral Neurovascular WDL WDL       Cognitive/Neuro/Behavioral WDL    Cognitive/Neuro/Behavioral WDL WDL

## 2023-04-24 NOTE — TELEPHONE ENCOUNTER
Carol,    Please see message below. Letter cued.  Spoke with pharmacy and insurance wouldn't cover dose sent, so sent correct dose covered by insurance.    Thanks  Dione Cabrera RN   [facility]    Valtrex Pregnancy And Lactation Text: this medication is Pregnancy Category B and is considered safe during pregnancy. This medication is not directly found in breast milk but it's metabolite acyclovir is present.

## 2023-04-26 ENCOUNTER — HOSPITAL ENCOUNTER (EMERGENCY)
Facility: CLINIC | Age: 33
Discharge: HOME | End: 2023-04-26
Attending: EMERGENCY MEDICINE | Admitting: EMERGENCY MEDICINE
Payer: COMMERCIAL

## 2023-04-26 VITALS
DIASTOLIC BLOOD PRESSURE: 79 MMHG | WEIGHT: 274 LBS | HEART RATE: 72 BPM | RESPIRATION RATE: 16 BRPM | SYSTOLIC BLOOD PRESSURE: 141 MMHG | TEMPERATURE: 97.2 F | BODY MASS INDEX: 40.46 KG/M2 | OXYGEN SATURATION: 100 %

## 2023-04-26 DIAGNOSIS — K62.89 RECTAL PAIN: ICD-10-CM

## 2023-04-26 PROCEDURE — 99283 EMERGENCY DEPT VISIT LOW MDM: CPT

## 2023-04-26 PROCEDURE — 250N000013 HC RX MED GY IP 250 OP 250 PS 637: Performed by: EMERGENCY MEDICINE

## 2023-04-26 RX ORDER — IBUPROFEN 600 MG/1
600 TABLET, FILM COATED ORAL ONCE
Status: COMPLETED | OUTPATIENT
Start: 2023-04-26 | End: 2023-04-26

## 2023-04-26 RX ORDER — KETOROLAC TROMETHAMINE 15 MG/ML
15 INJECTION, SOLUTION INTRAMUSCULAR; INTRAVENOUS ONCE
Status: DISCONTINUED | OUTPATIENT
Start: 2023-04-26 | End: 2023-04-26

## 2023-04-26 RX ADMIN — IBUPROFEN 600 MG: 600 TABLET ORAL at 12:59

## 2023-04-26 ASSESSMENT — ENCOUNTER SYMPTOMS
ABDOMINAL PAIN: 0
ANAL BLEEDING: 0
RECTAL PAIN: 1

## 2023-04-26 ASSESSMENT — ACTIVITIES OF DAILY LIVING (ADL): ADLS_ACUITY_SCORE: 33

## 2023-04-26 NOTE — DISCHARGE INSTRUCTIONS
Dietary recommendations to keep loose bowel movements: Lots of fluids, bran, fiber, fruits and vegetables.  MiraLAX over-the-counter can help.  Tylenol or ibuprofen every 6 hours as needed for pain can help.  Over-the-counter Anusol or Preparation H to the rectal anal area and inside after each bowel movement can help with pain.  See your doctor or clinic for follow-up in the next 3 to 5 days.

## 2023-04-26 NOTE — ED PROVIDER NOTES
EMERGENCY DEPARTMENT ENCOUNTER      NAME: Shanda Manrique  AGE: 32 year old female  YOB: 1990  MRN: [id number]  EVALUATION DATE & TIME: 4/26/2023  3:01 PM    PCP: No Ref-Primary, Physician    ED PROVIDER: Jorge Luis Mcintyre M.D.      Chief Complaint   Patient presents with     Rectal/perineal Pain         FINAL IMPRESSION:  1.  Acute rectal pain.  2.  Suspect fissure in ano.      ED COURSE & MEDICAL DECISION MAKING:    3:28 PM I met with the patient to gather history and to perform my initial exam. We discussed plans for the ED course, including diagnostic testing and treatment. PPE worn: cloth mask.  Patient complaining of anal or rectal pain since Monday when she had anal sex for the first time.  She denies any bleeding but notes increased pain with sitting or trying to move her bowels.  She notes the pain feels internal to the outer anus.  No evidence for cellulitis or palpable abscess on physical examination.  3:57 PM.  Patient refused to stay any longer as she has to leave to  kids from school.  Studies were all canceled.  Patient counseled as to Tylenol ibuprofen, dietary recommendations, and over-the-counter Anusol or Preparation H.    Pertinent Labs & Imaging studies reviewed. (See chart for details)      32 year old female presents to the Emergency Department for evaluation of rectal pain.    At the conclusion of the encounter I discussed the results of all of the tests and the disposition. The questions were answered. The patient or family acknowledged understanding and was agreeable with the care plan.       Medical Decision Making    History:    Supplemental history from: Documented in chart, if applicable    External Record(s) reviewed: Both inpatient and outpatient computer records were reviewed.    Work Up:    Chart documentation includes differential considered and any EKGs or imaging independently interpreted by provider, where specified.  Differential diagnosis includes abscess,  rectal fissure, etc.    In additional to work up documented, I considered the following work up: Documented in chart, if applicable.    External consultation:    Discussion of management with another provider: Documented in chart, if applicable    Complicating factors:    Care impacted by chronic illness: N/A    Care affected by social determinants of health: N/A    Disposition considerations: Anticipate discharge home.             MEDICATIONS GIVEN IN THE EMERGENCY:  Medications   ibuprofen (ADVIL/MOTRIN) tablet 600 mg (600 mg Oral $Given 23 1259)       NEW PRESCRIPTIONS STARTED AT TODAY'S ER VISIT  New Prescriptions    No medications on file          =================================================================    HPI    Patient information was obtained from: Patient    Use of : N/A       Shanda Manrique is a 32 year old female with a pertinent history of s/p  section who presents to this ED by walk in for evaluation of rectal pain.    Patient reports that she had anal sex on 23 for the first time. Patient reports that she has had pain to her rectum since. She says that it is painful to sit down or move. Patient endorses taking ibuprofen for her pain. Patient denies any chance of pregnancy, abdominal pain, anal bleeding, or any other concerns at this time.    She does not identify any waxing or waning symptoms otherwise, exacerbating or alleviating features, associated symptoms except as mentioned.     REVIEW OF SYSTEMS   Review of Systems   Gastrointestinal: Positive for rectal pain. Negative for abdominal pain and anal bleeding.   All other systems reviewed and are negative.    PAST MEDICAL HISTORY:  Past Medical History:   Diagnosis Date     Obesity        PAST SURGICAL HISTORY:  Past Surgical History:   Procedure Laterality Date      SECTION N/A 2020    Procedure:  SECTION;  Surgeon: Mago Dumont MD;  Location: [facility]     CHALAZION EXCISION            CURRENT MEDICATIONS:    loperamide (IMODIUM A-D) 2 MG tablet  ondansetron (ZOFRAN) 4 MG/5ML solution  triamcinolone (KENALOG) 0.1 % external cream  vitamin D2 (ERGOCALCIFEROL) 31106 units (1250 mcg) capsule        ALLERGIES:  Allergies   Allergen Reactions     Codeine Hives     PN: LW Reaction: HIVES     Morphine Hives     Tramadol Hives     PN: LW Reaction: HIVES         FAMILY HISTORY:  Family History   Problem Relation Age of Onset     Diabetes Maternal Uncle      Breast Cancer Maternal Aunt      Glaucoma No family hx of      Macular Degeneration No family hx of      Melanoma No family hx of      Skin Cancer No family hx of        SOCIAL HISTORY:   Social History     Socioeconomic History     Marital status: Single   Tobacco Use     Smoking status: Every Day     Packs/day: 0.25     Types: Cigarettes     Smokeless tobacco: Never   Vaping Use     Vaping status: Never Used   Substance and Sexual Activity     Alcohol use: Not Currently     Comment: Seldom     Drug use: Not Currently     Types: Marijuana     Comment: once in awhile     Sexual activity: Yes     Partners: Male     Birth control/protection: None       VITALS:  BP (!) 141/79   Pulse 72   Temp 97.2  F (36.2  C) (Temporal)   Resp 16   Wt 124.3 kg (274 lb)   LMP 04/21/2023   SpO2 100%   BMI 40.46 kg/m      PHYSICAL EXAM    Vital Signs:   General:  On entering the room she appears to be in moderate discomfort.  Patient drove to the hospital.  Neck:  Neck supple with full range of motion and nontender.    Back:  Back and spine are nontender.  No costovertebral angle tenderness.    HEENT:  Oropharynx clear with moist mucous membranes.  HEENT unremarkable.    Pulmonary:  Chest clear to auscultation without rhonchi rales or wheezing.    Cardiovascular:  Cardiac regular rate and rhythm without murmurs rubs or gallops.    Abdomen:  Abdomen soft nontender.  There is no rebound or guarding.  Examination the perirectal area with female nurse present  reveals no evidence for cellulitis or palpable abscess.  There is no gluteal pain or outer anal pain.  Patient has pain internal to the rectum/anus.  No bleeding.  Muskuloskeletal:  she moves all 4 without any difficulty and has normal neurovascular exams.  Extremities without clubbing, cyanosis, or edema.  Legs and calves are nontender.    Neuro:  she is alert and oriented ×3 and moves all extremities symmetrically.    Psych:  Normal affect.    Skin:  Unremarkable and warm and dry.       LAB:  All pertinent labs reviewed and interpreted.  Labs Ordered and Resulted from Time of ED Arrival to Time of ED Departure - No data to display    RADIOLOGY:  Reviewed all pertinent imaging. Please see official radiology report.  No orders to display              EKG:          PROCEDURES:         I, Konrad Kaplan, am serving as a scribe to document services personally performed by Dr. Mcintyre based on my observation and the provider's statements to me. I, Jorge Luis Mcintyre MD attest that Konrad Kaplan is acting in a scribe capacity, has observed my performance of the services and has documented them in accordance with my direction.    Jorge Luis Mcintyre M.D.  Emergency Medicine  [facility] EMERGENCY ROOM  [address] [facility] [id number] [phone]  Dept: [phone]       Jorge Luis Mcintyre MD  04/26/23 5330

## 2023-04-26 NOTE — ED NOTES
"RN went to room to start pt work up. Pt walking out of room at time. Pt refusing work up. Pt reports \"she has to go get her son and has been here since noon\". MD Mcintyre updated.   "

## 2023-09-13 ENCOUNTER — APPOINTMENT (OUTPATIENT)
Dept: RADIOLOGY | Facility: CLINIC | Age: 33
End: 2023-09-13
Payer: COMMERCIAL

## 2023-09-13 ENCOUNTER — HOSPITAL ENCOUNTER (EMERGENCY)
Facility: CLINIC | Age: 33
Discharge: HOME | End: 2023-09-13
Attending: STUDENT IN AN ORGANIZED HEALTH CARE EDUCATION/TRAINING PROGRAM | Admitting: STUDENT IN AN ORGANIZED HEALTH CARE EDUCATION/TRAINING PROGRAM
Payer: COMMERCIAL

## 2023-09-13 VITALS
DIASTOLIC BLOOD PRESSURE: 81 MMHG | SYSTOLIC BLOOD PRESSURE: 139 MMHG | TEMPERATURE: 97.8 F | BODY MASS INDEX: 40.32 KG/M2 | RESPIRATION RATE: 18 BRPM | HEART RATE: 73 BPM | WEIGHT: 273 LBS | OXYGEN SATURATION: 100 %

## 2023-09-13 DIAGNOSIS — B34.9 VIRAL ILLNESS: ICD-10-CM

## 2023-09-13 LAB
ANION GAP SERPL CALCULATED.3IONS-SCNC: 9 MMOL/L (ref 7–15)
ATRIAL RATE - MUSE: 77 BPM
BASOPHILS # BLD AUTO: 0.1 10E3/UL (ref 0–0.2)
BASOPHILS NFR BLD AUTO: 1 %
BUN SERPL-MCNC: 8.2 MG/DL (ref 6–20)
CALCIUM SERPL-MCNC: 8.8 MG/DL (ref 8.6–10)
CHLORIDE SERPL-SCNC: 108 MMOL/L (ref 98–107)
CREAT SERPL-MCNC: 0.68 MG/DL (ref 0.51–0.95)
DEPRECATED HCO3 PLAS-SCNC: 21 MMOL/L (ref 22–29)
DIASTOLIC BLOOD PRESSURE - MUSE: 76 MMHG
EGFRCR SERPLBLD CKD-EPI 2021: >90 ML/MIN/1.73M2
EOSINOPHIL # BLD AUTO: 0.2 10E3/UL (ref 0–0.7)
EOSINOPHIL NFR BLD AUTO: 3 %
ERYTHROCYTE [DISTWIDTH] IN BLOOD BY AUTOMATED COUNT: 14.3 % (ref 10–15)
FLUAV RNA SPEC QL NAA+PROBE: NEGATIVE
FLUBV RNA RESP QL NAA+PROBE: NEGATIVE
GLUCOSE SERPL-MCNC: 113 MG/DL (ref 70–99)
HCT VFR BLD AUTO: 40 % (ref 35–47)
HGB BLD-MCNC: 13.3 G/DL (ref 11.7–15.7)
IMM GRANULOCYTES # BLD: 0 10E3/UL
IMM GRANULOCYTES NFR BLD: 0 %
INTERPRETATION ECG - MUSE: NORMAL
LYMPHOCYTES # BLD AUTO: 3.1 10E3/UL (ref 0.8–5.3)
LYMPHOCYTES NFR BLD AUTO: 38 %
MCH RBC QN AUTO: 28.8 PG (ref 26.5–33)
MCHC RBC AUTO-ENTMCNC: 33.3 G/DL (ref 31.5–36.5)
MCV RBC AUTO: 87 FL (ref 78–100)
MONOCYTES # BLD AUTO: 0.5 10E3/UL (ref 0–1.3)
MONOCYTES NFR BLD AUTO: 6 %
NEUTROPHILS # BLD AUTO: 4.3 10E3/UL (ref 1.6–8.3)
NEUTROPHILS NFR BLD AUTO: 52 %
NRBC # BLD AUTO: 0 10E3/UL
NRBC BLD AUTO-RTO: 0 /100
P AXIS - MUSE: 50 DEGREES
PLATELET # BLD AUTO: 265 10E3/UL (ref 150–450)
POTASSIUM SERPL-SCNC: 3.8 MMOL/L (ref 3.4–5.3)
PR INTERVAL - MUSE: 130 MS
QRS DURATION - MUSE: 90 MS
QT - MUSE: 366 MS
QTC - MUSE: 414 MS
R AXIS - MUSE: -12 DEGREES
RBC # BLD AUTO: 4.62 10E6/UL (ref 3.8–5.2)
RSV RNA SPEC NAA+PROBE: NEGATIVE
SARS-COV-2 RNA RESP QL NAA+PROBE: NEGATIVE
SODIUM SERPL-SCNC: 138 MMOL/L (ref 136–145)
SYSTOLIC BLOOD PRESSURE - MUSE: 116 MMHG
T AXIS - MUSE: 34 DEGREES
TROPONIN T SERPL HS-MCNC: <6 NG/L
VENTRICULAR RATE- MUSE: 77 BPM
WBC # BLD AUTO: 8.2 10E3/UL (ref 4–11)

## 2023-09-13 PROCEDURE — 99285 EMERGENCY DEPT VISIT HI MDM: CPT | Mod: 25

## 2023-09-13 PROCEDURE — 71046 X-RAY EXAM CHEST 2 VIEWS: CPT

## 2023-09-13 PROCEDURE — 85025 COMPLETE CBC W/AUTO DIFF WBC: CPT

## 2023-09-13 PROCEDURE — 36415 COLL VENOUS BLD VENIPUNCTURE: CPT

## 2023-09-13 PROCEDURE — 84484 ASSAY OF TROPONIN QUANT: CPT

## 2023-09-13 PROCEDURE — 80048 BASIC METABOLIC PNL TOTAL CA: CPT

## 2023-09-13 PROCEDURE — 87637 SARSCOV2&INF A&B&RSV AMP PRB: CPT | Performed by: STUDENT IN AN ORGANIZED HEALTH CARE EDUCATION/TRAINING PROGRAM

## 2023-09-13 PROCEDURE — 93005 ELECTROCARDIOGRAM TRACING: CPT | Performed by: STUDENT IN AN ORGANIZED HEALTH CARE EDUCATION/TRAINING PROGRAM

## 2023-09-13 RX ORDER — AZITHROMYCIN 250 MG/1
TABLET, FILM COATED ORAL
Qty: 6 TABLET | Refills: 0 | Status: SHIPPED | OUTPATIENT
Start: 2023-09-13 | End: 2023-09-18

## 2023-09-13 RX ORDER — GUAIFENESIN 600 MG/1
1200 TABLET, EXTENDED RELEASE ORAL 2 TIMES DAILY
Qty: 20 TABLET | Refills: 0 | Status: SHIPPED | OUTPATIENT
Start: 2023-09-13

## 2023-09-13 RX ORDER — BENZONATATE 100 MG/1
100 CAPSULE ORAL 3 TIMES DAILY PRN
Qty: 30 CAPSULE | Refills: 0 | Status: SHIPPED | OUTPATIENT
Start: 2023-09-13

## 2023-09-13 ASSESSMENT — ENCOUNTER SYMPTOMS
CHILLS: 1
COUGH: 1
SHORTNESS OF BREATH: 1
FEVER: 0
RHINORRHEA: 1

## 2023-09-13 ASSESSMENT — ACTIVITIES OF DAILY LIVING (ADL): ADLS_ACUITY_SCORE: 35

## 2023-09-13 NOTE — ED PROVIDER NOTES
Emergency Department Midlevel Supervisory Note     I personally saw the patient and performed a substantive portion of the visit including all aspects of the medical decision making.    ED Course:  11:42 AM Asia Betancourt PA-C staffed patient with me. I agree with their assessment and plan of management, and I will see the patient.  1:21 PM I met with the patient to introduce myself, gather additional history, perform my initial exam, and discuss the plan.     Brief HPI:     Shanda Manrique is a 33 year old female who presents for evaluation of shortness of breath.    Patient presents with 1 week of cough, shortness of breath, runny nose, and nasal congestion. She endorses associated chest pain that is worse with coughing and breathing. The pain radiates to the back. She reports having chills. Not on birth control. She denies any history of asthma. She otherwise denies any other medical concerns at the moment.      I, Chantell Bird, am serving as a scribe to document services personally performed by Andrew Hutchinson MD, based on my observations and the provider's statements to me.   I, Andrew Hutchinson MD, attest that Chantell Bird was acting in a scribe capacity, has observed my performance of the services and has documented them in accordance with my direction.    Brief Physical Exam: /76   Pulse 82   Temp 97.8  F (36.6  C) (Temporal)   Resp 18   Wt 123.8 kg (273 lb)   LMP 08/16/2023   SpO2 100%   BMI 40.32 kg/m    Constitutional:  Alert, in no acute distress  EYES: Conjunctivae clear  HENT:  Atraumatic, normocephalic  Respiratory:  Respirations even, unlabored, in no acute respiratory distressLungs clear  Cardiovascular:  Regular rate and rhythm, good peripheral perfusion  GI: Soft, nondistended, nontender, no palpable masses, no rebound, no guarding   Musculoskeletal:  No edema. No cyanosis. Range of motion major extremities intact.    Integument: Warm, Dry, No erythema, No rash.   Neurologic:  Alert & oriented,  no focal deficits noted  Psych: Normal mood and affect     MDM:  Patient with viral cough and sob.  Most likely bronchitis.  No pneumonia.  PE unlikely.  ACS unlikely.  Work up reassurring.  Will discharge with bronchitis treatment       No diagnosis found.    Labs and Imaging:     I have reviewed the relevant laboratory and radiology studies    Procedures:  I was present for the key portions of this procedure:     Andrew Hutchinson MD  [facility] EMERGENCY ROOM  [address] [place] [id number] [phone]       Andrew Hutchinson MD  09/26/23 4723

## 2023-09-13 NOTE — Clinical Note
Shanda Manrique was seen and treated in our emergency department on 9/13/2023.  She may return to work on 09/17/2023.       If you have any questions or concerns, please don't hesitate to call.      Asia Betancourt, REJI

## 2023-09-13 NOTE — ED TRIAGE NOTES
Pt arrives to ED with c/o nasal congestion, cough, and shortness of breath for the past week. Chest pain from her cough started today. No hx of asthma. Wet cough.      Triage Assessment       Row Name 09/13/23 1106       Triage Assessment (Adult)    Airway WDL WDL       Respiratory WDL    Respiratory WDL X;rhythm/pattern;cough    Rhythm/Pattern, Respiratory shortness of breath    Cough Frequency frequent    Cough Type congested       Skin Circulation/Temperature WDL    Skin Circulation/Temperature WDL WDL       Cardiac WDL    Cardiac WDL X;chest pain       Chest Pain Assessment    Chest Pain Location midsternal       Peripheral/Neurovascular WDL    Peripheral Neurovascular WDL WDL       Cognitive/Neuro/Behavioral WDL    Cognitive/Neuro/Behavioral WDL WDL       Jett Coma Scale    Best Eye Response 4-->(E4) spontaneous    Best Motor Response 6-->(M6) obeys commands    Best Verbal Response 5-->(V5) oriented    Jett Coma Scale Score 15

## 2023-09-13 NOTE — ED PROVIDER NOTES
EMERGENCY DEPARTMENT ENCOUNTER      NAME: Shanda Manrique  AGE: 33 year old female  YOB: 1990  MRN: [id number]  EVALUATION DATE & TIME: No admission date for patient encounter.    PCP: No Ref-Primary, Physician    ED PROVIDER: Asia Betancourt PA-C      Chief Complaint   Patient presents with    Shortness of Breath    Cough     FINAL IMPRESSION:  1. Viral illness      ED COURSE & MEDICAL DECISION MAKING:    Pertinent Labs & Imaging studies reviewed. (See chart for details)  33 year old female presents to the Emergency Department for evaluation of cough, runny nose, nasal congestion and shortness of breath for the past week.  Patient reports that she has chest pain with coughing.  Vital signs reviewed and unremarkable.  Afebrile.  On exam, chest wall is nontender to palpation.  Cardiac and pulm exams unremarkable.  No sinus tenderness.  No trismus.  Uvula is midline.  Trachea is midline.  Posterior oropharynx is not erythematous.  Normal range of motion of neck.    Differential diagnosis includes COVID, influenza, RSV, pneumonia, pneumothorax, hemothorax, ACS, pericarditis, myocarditis.   Influenza, COVID, RSV was negative.  CBC shows a white blood cell elevation.  Hemoglobin is stable.  Basic is unremarkable.  Troponin is not elevated.  Delta troponin is not indicated at this time.  EKG shows no STEMI.  Chest x-ray shows normal heart and mediastinal contours.  Normal lung vascularity.  There is bronchial wall thickening around the shelly.  No peripheral airspace is or interstitial lung opacities.  Diaphragm curvature is normal.  No pleural effusion.  ACS, pericarditis, myocarditis unlikely.  Patient is PERC negative.  PE unlikely.  Was educated on her imaging and lab results.    Symptoms consistent with a viral illness.  Patient will be prescribed Mucinex and Tessalon Perles.  Patient will also be prescribed Z-Rodney due to the fact that she is a smoker.  Patient will follow-up with her primary care doctor  discuss her ED visit.  Patient return to the ED if new symptoms develop symptoms worsen.  Patient agrees with plan.  All questions answered.      ED COURSE:   11:40 AM I saw the patient. Staffed with Dr. Hutchinson.  1:30 PM I checked on the patient and updated her on results.  Be discharged home.  Patient present plan.  All questions answered.       At the conclusion of the encounter I discussed the results of all of the tests and the disposition. The questions were answered. The patient or family acknowledged understanding and was agreeable with the care plan.     0 minutes of critical care time       Additional Documentation    History:  Supplemental history from: Documented in chart, if applicable  External Record(s) reviewed: Documented in chart, if applicable.    Work Up:  Chart documentation includes differential considered and any EKGs or imaging interpreted by provider.  In additional to work up documented, I considered the following work up: Documented in chart, if applicable.    External consultation:  Discussion of management with another provider: Documented in chart, if applicable    Complicating factors:  Care impacted by chronic illness: N/A  Care affected by social determinants of health: N/A    Disposition considerations: Discharge. I prescribed additional prescription strength medication(s) as charted. See documentation for any additional details.      MEDICATIONS GIVEN IN THE EMERGENCY:  Medications - No data to display    NEW PRESCRIPTIONS STARTED AT TODAY'S ER VISIT  Discharge Medication List as of 9/13/2023  1:50 PM        START taking these medications    Details   azithromycin (ZITHROMAX) 250 MG tablet Take 2 tablets (500 mg) by mouth daily for 1 day, THEN 1 tablet (250 mg) daily for 4 days., Disp-6 tablet, R-0, Local Print      benzonatate (TESSALON) 100 MG capsule Take 1 capsule (100 mg) by mouth 3 times daily as needed for cough, Disp-30 capsule, R-0, Local Print      guaiFENesin (MUCINEX) 600  MG 12 hr tablet Take 2 tablets (1,200 mg) by mouth 2 times daily, Disp-20 tablet, R-0, Local Print                =================================================================    HPI    Patient information was obtained from: Patient     Use of : N/A       Shanda Manrique is a 33 year old female with a pertinent history of morbid obesity, vitamin D deficiency, who presents to this ED via walk in for evaluation of shortness of breath and cough.     Patient presents with 1 week of cough, shortness of breath, runny nose, and nasal congestion. She endorses associated chest pain that is worse with coughing and breathing radiates throughout her chest. She reports having chills. Not on birth control. She denies any history of asthma. She otherwise denies any other medical concerns at the moment.     REVIEW OF SYSTEMS   Review of Systems   Constitutional:  Positive for chills. Negative for fever.   HENT:  Positive for congestion (nasal) and rhinorrhea.    Respiratory:  Positive for cough and shortness of breath.    Cardiovascular:  Positive for chest pain.        PAST MEDICAL HISTORY:  Past Medical History:   Diagnosis Date    Obesity        PAST SURGICAL HISTORY:  Past Surgical History:   Procedure Laterality Date     SECTION N/A 2020    Procedure:  SECTION;  Surgeon: Mago Dumont MD;  Location: [facility]    CHALAZION EXCISION             CURRENT MEDICATIONS:    azithromycin (ZITHROMAX) 250 MG tablet  benzonatate (TESSALON) 100 MG capsule  guaiFENesin (MUCINEX) 600 MG 12 hr tablet  loperamide (IMODIUM A-D) 2 MG tablet  ondansetron (ZOFRAN) 4 MG/5ML solution  triamcinolone (KENALOG) 0.1 % external cream  vitamin D2 (ERGOCALCIFEROL) 80894 units (1250 mcg) capsule        ALLERGIES:  Allergies   Allergen Reactions    Codeine Hives     PN: LW Reaction: HIVES    Morphine Hives    Tramadol Hives     PN: LW Reaction: HIVES         FAMILY HISTORY:  Family History   Problem Relation Age of Onset     Diabetes Maternal Uncle     Breast Cancer Maternal Aunt     Glaucoma No family hx of     Macular Degeneration No family hx of     Melanoma No family hx of     Skin Cancer No family hx of        SOCIAL HISTORY:   Social History     Socioeconomic History    Marital status: Single   Tobacco Use    Smoking status: Every Day     Packs/day: 0.25     Types: Cigarettes    Smokeless tobacco: Never   Vaping Use    Vaping Use: Never used   Substance and Sexual Activity    Alcohol use: Not Currently     Comment: Seldom    Drug use: Not Currently     Types: Marijuana     Comment: once in awhile    Sexual activity: Yes     Partners: Male     Birth control/protection: None       VITALS:  /81   Pulse 73   Temp 97.8  F (36.6  C) (Temporal)   Resp 18   Wt 123.8 kg (273 lb)   LMP 08/16/2023   SpO2 100%   BMI 40.32 kg/m      PHYSICAL EXAM    Physical Exam  Vitals and nursing note reviewed.   Constitutional:       General: She is not in acute distress.     Appearance: Normal appearance. She is not ill-appearing, toxic-appearing or diaphoretic.      Comments: Speaking in full sentences.   HENT:      Head: Atraumatic.      Jaw: There is normal jaw occlusion. No trismus, tenderness, swelling, pain on movement or malocclusion.      Right Ear: Hearing, tympanic membrane, ear canal and external ear normal.      Left Ear: Hearing, tympanic membrane and external ear normal.      Nose: Nose normal.      Right Sinus: No maxillary sinus tenderness or frontal sinus tenderness.      Left Sinus: No maxillary sinus tenderness or frontal sinus tenderness.      Mouth/Throat:      Mouth: Mucous membranes are moist.      Tongue: No lesions. Tongue does not deviate from midline.      Palate: No mass and lesions.      Pharynx: Oropharynx is clear. Uvula midline. No pharyngeal swelling, oropharyngeal exudate, posterior oropharyngeal erythema or uvula swelling.      Tonsils: No tonsillar exudate.   Eyes:      Conjunctiva/sclera: Conjunctivae  normal.      Pupils: Pupils are equal, round, and reactive to light.   Cardiovascular:      Rate and Rhythm: Normal rate and regular rhythm.      Pulses: Normal pulses.      Heart sounds: Normal heart sounds. No murmur heard.     No friction rub. No gallop.   Pulmonary:      Effort: Pulmonary effort is normal.      Breath sounds: Normal breath sounds. No wheezing or rales.   Chest:      Chest wall: No tenderness.   Abdominal:      General: Abdomen is flat. Bowel sounds are normal.      Tenderness: There is no abdominal tenderness. There is no guarding or rebound.   Musculoskeletal:         General: Normal range of motion.      Cervical back: Full passive range of motion without pain and normal range of motion. Normal range of motion.      Right lower leg: No edema.      Left lower leg: No edema.   Skin:     General: Skin is dry.   Neurological:      Mental Status: She is alert. Mental status is at baseline.   Psychiatric:         Mood and Affect: Mood normal.         Thought Content: Thought content normal.          LAB:  All pertinent labs reviewed and interpreted.  Labs Ordered and Resulted from Time of ED Arrival to Time of ED Departure   BASIC METABOLIC PANEL - Abnormal       Result Value    Sodium 138      Potassium 3.8      Chloride 108 (*)     Carbon Dioxide (CO2) 21 (*)     Anion Gap 9      Urea Nitrogen 8.2      Creatinine 0.68      Calcium 8.8      Glucose 113 (*)     GFR Estimate >90     INFLUENZA A/B, RSV, & SARS-COV2 PCR - Normal    Influenza A PCR Negative      Influenza B PCR Negative      RSV PCR Negative      SARS CoV2 PCR Negative     TROPONIN T, HIGH SENSITIVITY - Normal    Troponin T, High Sensitivity <6     CBC WITH PLATELETS AND DIFFERENTIAL    WBC Count 8.2      RBC Count 4.62      Hemoglobin 13.3      Hematocrit 40.0      MCV 87      MCH 28.8      MCHC 33.3      RDW 14.3      Platelet Count 265      % Neutrophils 52      % Lymphocytes 38      % Monocytes 6      % Eosinophils 3      %  Basophils 1      % Immature Granulocytes 0      NRBCs per 100 WBC 0      Absolute Neutrophils 4.3      Absolute Lymphocytes 3.1      Absolute Monocytes 0.5      Absolute Eosinophils 0.2      Absolute Basophils 0.1      Absolute Immature Granulocytes 0.0      Absolute NRBCs 0.0          RADIOLOGY:  Reviewed all pertinent imaging. Please see official radiology report.  Chest XR,  PA & LAT   Final Result   IMPRESSION:       Normal heart and mediastinal contours. Normal lung vascularity. There is bronchial wall thickening around the shelly. No peripheral airspace or interstitial lung opacities. Diaphragm curvature is normal. No pleural effusion.          EKG:    Performed at: 13-SEP-2023 11:15  Impression: Sinus rhythm  Rate: 77 BPM  Rhythm: Normal sinus  Axis: -12  MO Interval: 130 ms  QRS Interval: 90 ms  QTc Interval: 414 ms  ST Changes: None  Comparison: No previous ECGs available.   Dr. Hutchinson. have independently reviewed and interpreted the EKG(s) documented above.    I, Ruby Don, am serving as a scribe to document services personally performed by Asia Betancourt PA-C, based on my observation and the provider's statements to me. I, Asia Betancourt PA-C, attest that Ruby Don is acting in a scribe capacity, has observed my performance of the services and has documented them in accordance with my direction.    Asia Betancourt PA-C  [facility] EMERGENCY ROOM  [address] [id number] [phone]      Asia Betancourt PA-C  09/13/23 6703

## 2023-09-13 NOTE — DISCHARGE INSTRUCTIONS
Rest.  Drink lots of fluids.  Take Tessalon Perles for coughing. Take mucinex which can help reduce your congestion.  You have also been prescribed a Z-Rodney which is antibiotics to help prevent infection.  Been referred to a primary care clinic to establish care and to follow-up on your ED visit.  Return to the ED if new symptoms develop or symptoms worsen.

## 2024-06-22 ENCOUNTER — HEALTH MAINTENANCE LETTER (OUTPATIENT)
Age: 34
End: 2024-06-22

## (undated) DEVICE — BASIN SET MAJOR

## (undated) DEVICE — PREP CHLORAPREP 26ML TINTED ORANGE  260815

## (undated) DEVICE — ESU GROUND PAD UNIVERSAL W/O CORD

## (undated) DEVICE — CATH TRAY FOLEY SURESTEP 16FR WDRAIN BAG STLK LATEX A300316A

## (undated) DEVICE — SUCTION CANISTER MEDIVAC LINER 1500ML W/LID 65651-515

## (undated) DEVICE — STRAP KNEE/BODY 31143004

## (undated) DEVICE — STOCKING SLEEVE COMPRESSION CALF LG

## (undated) DEVICE — PACK C-SECTION LF PL15OTA83B

## (undated) DEVICE — DRSG STERI STRIP 1/4X3" R1541

## (undated) DEVICE — SOL WATER IRRIG 1000ML BOTTLE 07139-09

## (undated) DEVICE — SOL NACL 0.9% IRRIG 1000ML BOTTLE 07138-09

## (undated) RX ORDER — ACETAMINOPHEN 325 MG/1
TABLET ORAL
Status: DISPENSED
Start: 2020-06-29

## (undated) RX ORDER — MORPHINE SULFATE 1 MG/ML
INJECTION, SOLUTION EPIDURAL; INTRATHECAL; INTRAVENOUS
Status: DISPENSED
Start: 2020-06-29

## (undated) RX ORDER — FENTANYL CITRATE 50 UG/ML
INJECTION, SOLUTION INTRAMUSCULAR; INTRAVENOUS
Status: DISPENSED
Start: 2020-06-29

## (undated) RX ORDER — OXYTOCIN/0.9 % SODIUM CHLORIDE 30/500 ML
PLASTIC BAG, INJECTION (ML) INTRAVENOUS
Status: DISPENSED
Start: 2020-06-29

## (undated) RX ORDER — HYDRALAZINE HYDROCHLORIDE 20 MG/ML
INJECTION INTRAMUSCULAR; INTRAVENOUS
Status: DISPENSED
Start: 2020-06-29

## (undated) RX ORDER — OXYTOCIN/0.9 % SODIUM CHLORIDE 30/500 ML
PLASTIC BAG, INJECTION (ML) INTRAVENOUS
Status: DISPENSED
Start: 2020-06-30